# Patient Record
Sex: FEMALE | Race: OTHER | HISPANIC OR LATINO | URBAN - METROPOLITAN AREA
[De-identification: names, ages, dates, MRNs, and addresses within clinical notes are randomized per-mention and may not be internally consistent; named-entity substitution may affect disease eponyms.]

---

## 2018-09-21 ENCOUNTER — INPATIENT (INPATIENT)
Facility: HOSPITAL | Age: 83
LOS: 1 days | Discharge: HOME | DRG: 57 | End: 2018-09-23
Attending: STUDENT IN AN ORGANIZED HEALTH CARE EDUCATION/TRAINING PROGRAM | Admitting: STUDENT IN AN ORGANIZED HEALTH CARE EDUCATION/TRAINING PROGRAM
Payer: COMMERCIAL

## 2018-09-21 VITALS
HEART RATE: 136 BPM | RESPIRATION RATE: 16 BRPM | TEMPERATURE: 98 F | OXYGEN SATURATION: 96 % | DIASTOLIC BLOOD PRESSURE: 66 MMHG | SYSTOLIC BLOOD PRESSURE: 119 MMHG

## 2018-09-21 DIAGNOSIS — K59.09 OTHER CONSTIPATION: ICD-10-CM

## 2018-09-21 DIAGNOSIS — F03.90 UNSPECIFIED DEMENTIA WITHOUT BEHAVIORAL DISTURBANCE: ICD-10-CM

## 2018-09-21 DIAGNOSIS — R13.10 DYSPHAGIA, UNSPECIFIED: ICD-10-CM

## 2018-09-21 DIAGNOSIS — Z29.9 ENCOUNTER FOR PROPHYLACTIC MEASURES, UNSPECIFIED: ICD-10-CM

## 2018-09-21 DIAGNOSIS — G20 PARKINSON'S DISEASE: ICD-10-CM

## 2018-09-21 DIAGNOSIS — R62.7 ADULT FAILURE TO THRIVE: ICD-10-CM

## 2018-09-21 DIAGNOSIS — Z98.890 OTHER SPECIFIED POSTPROCEDURAL STATES: Chronic | ICD-10-CM

## 2018-09-21 DIAGNOSIS — I95.9 HYPOTENSION, UNSPECIFIED: ICD-10-CM

## 2018-09-21 LAB
ALBUMIN SERPL ELPH-MCNC: 3.4 G/DL — LOW (ref 3.5–5)
ALP SERPL-CCNC: 94 U/L — SIGNIFICANT CHANGE UP (ref 40–120)
ALT FLD-CCNC: 10 U/L DA — SIGNIFICANT CHANGE UP (ref 10–60)
ANION GAP SERPL CALC-SCNC: 6 MMOL/L — SIGNIFICANT CHANGE UP (ref 5–17)
APPEARANCE UR: CLEAR — SIGNIFICANT CHANGE UP
APTT BLD: 32.4 SEC — SIGNIFICANT CHANGE UP (ref 27.5–37.4)
AST SERPL-CCNC: 16 U/L — SIGNIFICANT CHANGE UP (ref 10–40)
BACTERIA # UR AUTO: ABNORMAL /HPF
BASOPHILS # BLD AUTO: 0.1 K/UL — SIGNIFICANT CHANGE UP (ref 0–0.2)
BASOPHILS NFR BLD AUTO: 1.1 % — SIGNIFICANT CHANGE UP (ref 0–2)
BILIRUB SERPL-MCNC: 0.4 MG/DL — SIGNIFICANT CHANGE UP (ref 0.2–1.2)
BILIRUB UR-MCNC: NEGATIVE — SIGNIFICANT CHANGE UP
BUN SERPL-MCNC: 15 MG/DL — SIGNIFICANT CHANGE UP (ref 7–18)
CALCIUM SERPL-MCNC: 8.8 MG/DL — SIGNIFICANT CHANGE UP (ref 8.4–10.5)
CHLORIDE SERPL-SCNC: 103 MMOL/L — SIGNIFICANT CHANGE UP (ref 96–108)
CO2 SERPL-SCNC: 29 MMOL/L — SIGNIFICANT CHANGE UP (ref 22–31)
COLOR SPEC: YELLOW — SIGNIFICANT CHANGE UP
COMMENT - URINE: SIGNIFICANT CHANGE UP
CREAT SERPL-MCNC: 0.57 MG/DL — SIGNIFICANT CHANGE UP (ref 0.5–1.3)
DIFF PNL FLD: ABNORMAL
EOSINOPHIL # BLD AUTO: 0.1 K/UL — SIGNIFICANT CHANGE UP (ref 0–0.5)
EOSINOPHIL NFR BLD AUTO: 1.2 % — SIGNIFICANT CHANGE UP (ref 0–6)
EPI CELLS # UR: SIGNIFICANT CHANGE UP /HPF
GLUCOSE SERPL-MCNC: 88 MG/DL — SIGNIFICANT CHANGE UP (ref 70–99)
GLUCOSE UR QL: NEGATIVE — SIGNIFICANT CHANGE UP
HCT VFR BLD CALC: 44.5 % — SIGNIFICANT CHANGE UP (ref 34.5–45)
HGB BLD-MCNC: 14.4 G/DL — SIGNIFICANT CHANGE UP (ref 11.5–15.5)
INR BLD: 1.1 RATIO — SIGNIFICANT CHANGE UP (ref 0.88–1.16)
KETONES UR-MCNC: NEGATIVE — SIGNIFICANT CHANGE UP
LACTATE SERPL-SCNC: 1.1 MMOL/L — SIGNIFICANT CHANGE UP (ref 0.7–2)
LEUKOCYTE ESTERASE UR-ACNC: ABNORMAL
LYMPHOCYTES # BLD AUTO: 1.6 K/UL — SIGNIFICANT CHANGE UP (ref 1–3.3)
LYMPHOCYTES # BLD AUTO: 20.7 % — SIGNIFICANT CHANGE UP (ref 13–44)
MAGNESIUM SERPL-MCNC: 2.5 MG/DL — SIGNIFICANT CHANGE UP (ref 1.6–2.6)
MCHC RBC-ENTMCNC: 30.9 PG — SIGNIFICANT CHANGE UP (ref 27–34)
MCHC RBC-ENTMCNC: 32.4 GM/DL — SIGNIFICANT CHANGE UP (ref 32–36)
MCV RBC AUTO: 95.3 FL — SIGNIFICANT CHANGE UP (ref 80–100)
MONOCYTES # BLD AUTO: 0.7 K/UL — SIGNIFICANT CHANGE UP (ref 0–0.9)
MONOCYTES NFR BLD AUTO: 9.2 % — SIGNIFICANT CHANGE UP (ref 2–14)
NEUTROPHILS # BLD AUTO: 5.1 K/UL — SIGNIFICANT CHANGE UP (ref 1.8–7.4)
NEUTROPHILS NFR BLD AUTO: 67.9 % — SIGNIFICANT CHANGE UP (ref 43–77)
NITRITE UR-MCNC: NEGATIVE — SIGNIFICANT CHANGE UP
PH UR: 6.5 — SIGNIFICANT CHANGE UP (ref 5–8)
PHOSPHATE SERPL-MCNC: 3.7 MG/DL — SIGNIFICANT CHANGE UP (ref 2.5–4.5)
PLATELET # BLD AUTO: 202 K/UL — SIGNIFICANT CHANGE UP (ref 150–400)
POTASSIUM SERPL-MCNC: 4.7 MMOL/L — SIGNIFICANT CHANGE UP (ref 3.5–5.3)
POTASSIUM SERPL-SCNC: 4.7 MMOL/L — SIGNIFICANT CHANGE UP (ref 3.5–5.3)
PROT SERPL-MCNC: 7.8 G/DL — SIGNIFICANT CHANGE UP (ref 6–8.3)
PROT UR-MCNC: NEGATIVE — SIGNIFICANT CHANGE UP
PROTHROM AB SERPL-ACNC: 12 SEC — SIGNIFICANT CHANGE UP (ref 9.8–12.7)
RBC # BLD: 4.66 M/UL — SIGNIFICANT CHANGE UP (ref 3.8–5.2)
RBC # FLD: 11.9 % — SIGNIFICANT CHANGE UP (ref 10.3–14.5)
RBC CASTS # UR COMP ASSIST: ABNORMAL /HPF (ref 0–2)
SODIUM SERPL-SCNC: 138 MMOL/L — SIGNIFICANT CHANGE UP (ref 135–145)
SP GR SPEC: 1.01 — SIGNIFICANT CHANGE UP (ref 1.01–1.02)
TROPONIN I SERPL-MCNC: <0.015 NG/ML — SIGNIFICANT CHANGE UP (ref 0–0.04)
TSH SERPL-MCNC: 0.59 UU/ML — SIGNIFICANT CHANGE UP (ref 0.34–4.82)
UROBILINOGEN FLD QL: NEGATIVE — SIGNIFICANT CHANGE UP
WBC # BLD: 7.5 K/UL — SIGNIFICANT CHANGE UP (ref 3.8–10.5)
WBC # FLD AUTO: 7.5 K/UL — SIGNIFICANT CHANGE UP (ref 3.8–10.5)
WBC UR QL: SIGNIFICANT CHANGE UP /HPF (ref 0–5)

## 2018-09-21 PROCEDURE — 71045 X-RAY EXAM CHEST 1 VIEW: CPT | Mod: 26

## 2018-09-21 PROCEDURE — 70450 CT HEAD/BRAIN W/O DYE: CPT | Mod: 26

## 2018-09-21 PROCEDURE — 99285 EMERGENCY DEPT VISIT HI MDM: CPT

## 2018-09-21 RX ORDER — SODIUM CHLORIDE 9 MG/ML
1000 INJECTION INTRAMUSCULAR; INTRAVENOUS; SUBCUTANEOUS ONCE
Qty: 0 | Refills: 0 | Status: COMPLETED | OUTPATIENT
Start: 2018-09-21 | End: 2018-09-21

## 2018-09-21 RX ORDER — DOCUSATE SODIUM 100 MG
100 CAPSULE ORAL
Qty: 0 | Refills: 0 | Status: DISCONTINUED | OUTPATIENT
Start: 2018-09-21 | End: 2018-09-23

## 2018-09-21 RX ORDER — ENOXAPARIN SODIUM 100 MG/ML
40 INJECTION SUBCUTANEOUS DAILY
Qty: 0 | Refills: 0 | Status: DISCONTINUED | OUTPATIENT
Start: 2018-09-21 | End: 2018-09-23

## 2018-09-21 RX ORDER — CARBIDOPA AND LEVODOPA 25; 100 MG/1; MG/1
1 TABLET ORAL
Qty: 0 | Refills: 0 | Status: DISCONTINUED | OUTPATIENT
Start: 2018-09-21 | End: 2018-09-22

## 2018-09-21 RX ORDER — SENNA PLUS 8.6 MG/1
2 TABLET ORAL AT BEDTIME
Qty: 0 | Refills: 0 | Status: DISCONTINUED | OUTPATIENT
Start: 2018-09-21 | End: 2018-09-23

## 2018-09-21 RX ORDER — POLYETHYLENE GLYCOL 3350 17 G/17G
17 POWDER, FOR SOLUTION ORAL DAILY
Qty: 0 | Refills: 0 | Status: DISCONTINUED | OUTPATIENT
Start: 2018-09-21 | End: 2018-09-23

## 2018-09-21 RX ADMIN — SODIUM CHLORIDE 1000 MILLILITER(S): 9 INJECTION INTRAMUSCULAR; INTRAVENOUS; SUBCUTANEOUS at 19:10

## 2018-09-21 RX ADMIN — SODIUM CHLORIDE 500 MILLILITER(S): 9 INJECTION INTRAMUSCULAR; INTRAVENOUS; SUBCUTANEOUS at 18:43

## 2018-09-21 NOTE — ED PROVIDER NOTE - CARE PLAN
Principal Discharge DX:	Hypotension  Secondary Diagnosis:	Failure to thrive in adult  Secondary Diagnosis:	Tachycardia

## 2018-09-21 NOTE — ED PROVIDER NOTE - PMH
Breast cancer    Chronic constipation    Dementia    Dysphagia    HLD (hyperlipidemia)    HTN (hypertension)    Parkinson's disease

## 2018-09-21 NOTE — ED ADULT NURSE NOTE - NSIMPLEMENTINTERV_GEN_ALL_ED
Implemented All Fall Risk Interventions:  [place] to call system. Call bell, personal items and telephone within reach. Instruct patient to call for assistance. Room bathroom lighting operational. Non-slip footwear when patient is off stretcher. Physically safe environment: no spills, clutter or unnecessary equipment. Stretcher in lowest position, wheels locked, appropriate side rails in place. Provide visual cue, wrist band, yellow gown, etc. Monitor gait and stability. Monitor for mental status changes and reorient to person, place, and time. Review medications for side effects contributing to fall risk. Reinforce activity limits and safety measures with patient and family.

## 2018-09-21 NOTE — ED ADULT NURSE NOTE - ED STAT RN HANDOFF DETAILS
pt.remained   stable.  denies  pain.   on Tele box B  with  nsr ,.transfer to  [id number]  given to nisa perez.pt.not   in distress

## 2018-09-21 NOTE — H&P ADULT - NSHPLABSRESULTS_GEN_ALL_CORE
LABS:      CBC Full  -  ( 21 Sep 2018 18:03 )  WBC Count : 7.5 K/uL  Hemoglobin : 14.4 g/dL  Hematocrit : 44.5 %  Platelet Count - Automated : 202 K/uL  Mean Cell Volume : 95.3 fl  Mean Cell Hemoglobin : 30.9 pg  Mean Cell Hemoglobin Concentration : 32.4 gm/dL  Auto Neutrophil # : 5.1 K/uL  Auto Lymphocyte # : 1.6 K/uL  Auto Monocyte # : 0.7 K/uL  Auto Eosinophil # : 0.1 K/uL  Auto Basophil # : 0.1 K/uL  Auto Neutrophil % : 67.9 %  Auto Lymphocyte % : 20.7 %  Auto Monocyte % : 9.2 %  Auto Eosinophil % : 1.2 %  Auto Basophil % : 1.1 %        138  |  103  |  15  ----------------------------<  88  4.7   |  29  |  0.57    Ca    8.8      21 Sep 2018 18:03  Phos  3.7       Mg     2.5         TPro  7.8  /  Alb  3.4<L>  /  TBili  0.4  /  DBili  x   /  AST  16  /  ALT  10  /  AlkPhos  94  -    PT/INR - ( 21 Sep 2018 18:03 )   PT: 12.0 sec;   INR: 1.10 ratio         PTT - ( 21 Sep 2018 18:03 )  PTT:32.4 sec      Urinalysis Basic - ( 21 Sep 2018 18:20 )    Color: Yellow / Appearance: Clear / S.010 / pH: x  Gluc: x / Ketone: Negative  / Bili: Negative / Urobili: Negative   Blood: x / Protein: Negative / Nitrite: Negative   Leuk Esterase: Trace / RBC: 2-5 /HPF / WBC 3-5 /HPF   Sq Epi: x / Non Sq Epi: Few /HPF / Bacteria: Few /HPF          RADIOLOGY & ADDITIONAL STUDIES (The following images were personally reviewed):      EXAM:  CT BRAIN                            PROCEDURE DATE:  2018          INTERPRETATION:  CT HEAD WITHOUT CONTRAST    INDICATION: 84 years old. Female. lethargy dysphagia.     COMPARISON: None available.    TECHNIQUE: Noncontrast axial CT head was obtained from the skull base to   vertex.    FINDINGS:  No acute intracranial hemorrhage, mass effect or midline shift.  No CT evidence of acute large territory vascular infarct.  The ventricles and cortical sulci are prominent reflecting parenchymal   volume loss.  Patchy hypodensities in the periventricular and subcortical white matter   are nonspecific, but likely sequela of small vessel ischemic disease.   There are also ischemic changes in the internal capsules.  Intracranial atherosclerotic calcifications are present.    The visualized paranasal sinuses and mastoid air cells are well aerated.   The native ocular lenses are surgically absent.  No displaced calvarial fracture.    IMPRESSION:  No acute intracranial hemorrhage or mass effect.      CXR:  mild atelectasis at left lung base

## 2018-09-21 NOTE — H&P ADULT - PROBLEM SELECTOR PLAN 3
patient has hx of dysphagia  continue with pureed diet, nectar thick for now  f/u formal speech and swallow

## 2018-09-21 NOTE — H&P ADULT - PROBLEM SELECTOR PLAN 1
decreased oral intake, however infectious etiology unlikely at this point  more likely decreased intake/activity secondary to requiring Parkinson medication adjustment decreased oral intake, however unlikely infectious etiology at this point given afebrile, no leukocytosis, CXR no consolidation, UA negative  more likely decreased intake/activity secondary to requiring Parkinson medication adjustment  will continue on home regimen for now  f/u neuro consult Dr. Winston for possible adjustment as necessary

## 2018-09-21 NOTE — ED PROVIDER NOTE - MEDICAL DECISION MAKING DETAILS
85 y/o M presents with low blood pressure and lethargy. Will rule out sepsis, CVA, electrolyte abnormality, Parkinson's crisis. Will obtain EKG, cardiac monitor, CXR, urinalysis, electrolytes, CBC, troponin, and TSH. Administer fluid bolus, reassess and admit.

## 2018-09-21 NOTE — ED PROVIDER NOTE - PROGRESS NOTE DETAILS
No emergent findings on workup, CXR read as possible infiltrate but given no cough, no leukocytosis, no fever, and equivocal read, will defer abx unless clinical picture changes. hypotension likely 2/2 paroxysmal tachyarrhythmia given marked tachycardia on arrival that abruptly normalized without intervention, pt admitted to telemetry for further workup

## 2018-09-21 NOTE — H&P ADULT - PROBLEM SELECTOR PLAN 2
1.5 tabs of sinemet tabs 6 times per day as per family  will continue with patient's regimen for now  continue to evaluate for increased activity 1.5 tabs of sinemet tabs 6 times per day as per family  will continue with patient's regimen for now  continue to evaluate for increased/decreased activity

## 2018-09-21 NOTE — H&P ADULT - ATTENDING COMMENTS
Agree with above   patient seen and examined at bedside   She is a 83 y/o F with PMHx of HTN, HLD, dementia, Parkinson's disease, chronic constipation and dysphagia x years, breast cancer s/p lumpectomy, brought in by family due to reduced oral intake and activity over the last week. Patient was originally brought to PCP office, at which point they had noted hypotension and tachycardia, and advised family to bring patient to ED. Per family, patient has been gradually decreasing PO intake, however has successfully been taking medications with some ounces of Ensure. Family otherwise denies all other complaints including fever, sweating, vomiting, diarrhea, shortness of breath. Per , patient's last bowel movement noted to be 4 days ago. Patient's family deny any recent medication changes, have noted that citalopram was gradually tapered by PCP. Patient is non-verbal and history is limited by dementia    ROS: not able to obtain ROS from patient   PE:   General: elderly lady, laying in bed. Does not respond to questions, just makes face grimaces   Neck; Supple   cardio: regular rate and rhythm   Pulm: clear breath sounds, no wheezing   GI: abdomens is soft, non tender \  Extr: no edema.   Neuro: Alert, not oriented, no focal weakness     Vital Signs Last 24 Hrs  T(C): 36.5 (22 Sep 2018 11:52), Max: 36.9 (22 Sep 2018 07:25)  T(F): 97.7 (22 Sep 2018 11:52), Max: 98.5 (22 Sep 2018 07:25)  HR: 76 (22 Sep 2018 11:52) (68 - 136)  BP: 105/56 (22 Sep 2018 11:52) (105/56 - 147/77)  BP(mean): --  RR: 16 (22 Sep 2018 11:52) (16 - 18)  SpO2: 99% (22 Sep 2018 11:52) (95% - 99%)    1. Failure to thrive   2. Advance PD  3. Dehydration   4. Chronic constipation     1. Failure to thrive likely secondary to advance PD: patient on Sinemet q4 and will continue with the same dose   IV hydration   Labs and imaging within normal limits   PT evaluation if patient participates   Patient on dysphagia diet and should continue with the same   Will speak to family about comfort care.   d/c telemetry monitoring   Will give stool softeners and one enema     The rest as above

## 2018-09-21 NOTE — ED PROVIDER NOTE - CARDIAC, MLM
Normal rate, regular rhythm.  Heart sounds S1, S2.  No murmurs, rubs or gallops. (Resolved from triage)

## 2018-09-21 NOTE — H&P ADULT - ASSESSMENT
83 y/o F with PMHx of HTN, HLD, dementia, Parkinson's disease, chronic constipation and dysphagia x years, breast cancer s/p lumpectomy, brought in by family due to reduced oral intake and activity over the last week. Patient was originally brought to PCP office, at which point they had noted hypotension and tachycardia, and advised family to bring patient to ED. Per family, patient has been gradually decreasing PO intake, however has successfully been taking medications with some ounces of Ensure. Family otherwise denies all other complaints including fever, sweating, vomiting, diarrhea, shortness of breath. Per , patient's last bowel movement noted to be 4 days ago. Patient's family deny any recent medication changes, have noted that citalopram was gradually tapered by PCP    Patient admitted due to lethargy, poor oral intake, decreased activity. However patient does not have any evidence of fever, leukocytosis, or any other signs of infection.   Patient likely needs Parkinson medication adjustment.     GOC: Discussed with family. Patient is FULL CODE for now

## 2018-09-21 NOTE — ED PROVIDER NOTE - OBJECTIVE STATEMENT
83 y/o F with PMHx of HTN, HLD, dementia, Parkinson's disease, chronic constipation and dysphagia x years, breast cancer s/p lumpectomy presents to the ED with complaints of low blood pressure and lethargy. Patient has been refusing PO intake x 4 days except for medications and a few ounces of Ensure. Family brought patient to PCP at which time blood pressure was low. Patient was then referred to ED for further evaluation. Patient has chronically poor intake, which has worsened over last few days as per family. Denies vomiting, blood stool, cough, fever, medication changes or any other complaints. Patient is non-verbal and history is limited by dementia. NKDA.

## 2018-09-21 NOTE — H&P ADULT - PROBLEM SELECTOR PLAN 6
IMPROVE VTE Individual Risk Assessment          RISK                                                          Points  [  ] Previous VTE                                                3  [  ] Thrombophilia                                             2  [ x ] Lower limb paralysis                                   2        (unable to hold up >15 seconds)    [  ] Current Cancer                                             2         (within 6 months)  [ x ] Immobilization > 24 hrs                              1  [  ] ICU/CCU stay > 24 hours                             1  [ x ] Age > 60                                                         1    IMPROVE VTE Score: 4    c/w lovenox for vte prophylaxis  no gi prophylaxis necessary at this time

## 2018-09-21 NOTE — H&P ADULT - NSHPPHYSICALEXAM_GEN_ALL_CORE
Vital Signs Last 24 Hrs  T(C): 36.3 (21 Sep 2018 21:08), Max: 36.8 (21 Sep 2018 16:18)  T(F): 97.4 (21 Sep 2018 21:08), Max: 98.3 (21 Sep 2018 16:18)  HR: 75 (21 Sep 2018 21:08) (75 - 136)  BP: 147/77 (21 Sep 2018 21:08) (119/66 - 147/77)  RR: 18 (21 Sep 2018 21:08) (16 - 18)  SpO2: 99% (21 Sep 2018 21:08) (96% - 99%)    ________________________________________________  PHYSICAL EXAM:  GENERAL: NAD  HEENT: Normocephalic;  conjunctivae and sclerae clear; PERRL  NECK : supple  CHEST/LUNG: Clear to auscultation bilaterally with good air entry   HEART: S1 S2  regular; no murmurs, gallops or rubs  ABDOMEN: somewhat firm, Nontender, Nondistended; Bowel sounds present  EXTREMITIES: no cyanosis; no edema; no calf tenderness; lower extremities stiff  NERVOUS SYSTEM:  Awake , minimally verbal, masked facies, cogwheel rigidity on bilateral upper extremities R>L, good strength, moves all extremities spontaneously

## 2018-09-22 LAB
ANION GAP SERPL CALC-SCNC: 9 MMOL/L — SIGNIFICANT CHANGE UP (ref 5–17)
BUN SERPL-MCNC: 13 MG/DL — SIGNIFICANT CHANGE UP (ref 7–18)
CALCIUM SERPL-MCNC: 8.8 MG/DL — SIGNIFICANT CHANGE UP (ref 8.4–10.5)
CHLORIDE SERPL-SCNC: 106 MMOL/L — SIGNIFICANT CHANGE UP (ref 96–108)
CHOLEST SERPL-MCNC: 233 MG/DL — HIGH (ref 10–199)
CK MB BLD-MCNC: 2 % — SIGNIFICANT CHANGE UP (ref 0–3.5)
CK MB CFR SERPL CALC: 1 NG/ML — SIGNIFICANT CHANGE UP (ref 0–3.6)
CK SERPL-CCNC: 49 U/L — SIGNIFICANT CHANGE UP (ref 21–215)
CO2 SERPL-SCNC: 24 MMOL/L — SIGNIFICANT CHANGE UP (ref 22–31)
CREAT SERPL-MCNC: 0.6 MG/DL — SIGNIFICANT CHANGE UP (ref 0.5–1.3)
CULTURE RESULTS: NO GROWTH — SIGNIFICANT CHANGE UP
GLUCOSE SERPL-MCNC: 104 MG/DL — HIGH (ref 70–99)
HBA1C BLD-MCNC: 5.4 % — SIGNIFICANT CHANGE UP (ref 4–5.6)
HCT VFR BLD CALC: 45.5 % — HIGH (ref 34.5–45)
HDLC SERPL-MCNC: 43 MG/DL — LOW
HGB BLD-MCNC: 14.6 G/DL — SIGNIFICANT CHANGE UP (ref 11.5–15.5)
LIPID PNL WITH DIRECT LDL SERPL: 163 MG/DL — SIGNIFICANT CHANGE UP
MAGNESIUM SERPL-MCNC: 2.2 MG/DL — SIGNIFICANT CHANGE UP (ref 1.6–2.6)
MCHC RBC-ENTMCNC: 30.7 PG — SIGNIFICANT CHANGE UP (ref 27–34)
MCHC RBC-ENTMCNC: 32.1 GM/DL — SIGNIFICANT CHANGE UP (ref 32–36)
MCV RBC AUTO: 95.5 FL — SIGNIFICANT CHANGE UP (ref 80–100)
PHOSPHATE SERPL-MCNC: 3.1 MG/DL — SIGNIFICANT CHANGE UP (ref 2.5–4.5)
PLATELET # BLD AUTO: 187 K/UL — SIGNIFICANT CHANGE UP (ref 150–400)
POTASSIUM SERPL-MCNC: 3.8 MMOL/L — SIGNIFICANT CHANGE UP (ref 3.5–5.3)
POTASSIUM SERPL-SCNC: 3.8 MMOL/L — SIGNIFICANT CHANGE UP (ref 3.5–5.3)
RBC # BLD: 4.76 M/UL — SIGNIFICANT CHANGE UP (ref 3.8–5.2)
RBC # FLD: 11.8 % — SIGNIFICANT CHANGE UP (ref 10.3–14.5)
SODIUM SERPL-SCNC: 139 MMOL/L — SIGNIFICANT CHANGE UP (ref 135–145)
SPECIMEN SOURCE: SIGNIFICANT CHANGE UP
TOTAL CHOLESTEROL/HDL RATIO MEASUREMENT: 5.4 RATIO — SIGNIFICANT CHANGE UP (ref 3.3–7.1)
TRIGL SERPL-MCNC: 134 MG/DL — SIGNIFICANT CHANGE UP (ref 10–149)
TROPONIN I SERPL-MCNC: <0.015 NG/ML — SIGNIFICANT CHANGE UP (ref 0–0.04)
TSH SERPL-MCNC: 0.79 UU/ML — SIGNIFICANT CHANGE UP (ref 0.34–4.82)
VIT B12 SERPL-MCNC: 430 PG/ML — SIGNIFICANT CHANGE UP (ref 232–1245)
WBC # BLD: 7.5 K/UL — SIGNIFICANT CHANGE UP (ref 3.8–10.5)
WBC # FLD AUTO: 7.5 K/UL — SIGNIFICANT CHANGE UP (ref 3.8–10.5)

## 2018-09-22 PROCEDURE — 99223 1ST HOSP IP/OBS HIGH 75: CPT | Mod: GC

## 2018-09-22 PROCEDURE — 99223 1ST HOSP IP/OBS HIGH 75: CPT

## 2018-09-22 RX ORDER — INFLUENZA VIRUS VACCINE 15; 15; 15; 15 UG/.5ML; UG/.5ML; UG/.5ML; UG/.5ML
0.5 SUSPENSION INTRAMUSCULAR ONCE
Qty: 0 | Refills: 0 | Status: DISCONTINUED | OUTPATIENT
Start: 2018-09-22 | End: 2018-09-23

## 2018-09-22 RX ORDER — PANTOPRAZOLE SODIUM 20 MG/1
40 TABLET, DELAYED RELEASE ORAL
Qty: 0 | Refills: 0 | Status: DISCONTINUED | OUTPATIENT
Start: 2018-09-22 | End: 2018-09-23

## 2018-09-22 RX ORDER — CLONAZEPAM 1 MG
0.5 TABLET ORAL DAILY
Qty: 0 | Refills: 0 | Status: DISCONTINUED | OUTPATIENT
Start: 2018-09-22 | End: 2018-09-23

## 2018-09-22 RX ORDER — CARBIDOPA AND LEVODOPA 25; 100 MG/1; MG/1
1.5 TABLET ORAL
Qty: 0 | Refills: 0 | Status: DISCONTINUED | OUTPATIENT
Start: 2018-09-22 | End: 2018-09-23

## 2018-09-22 RX ADMIN — CARBIDOPA AND LEVODOPA 1.5 TABLET(S): 25; 100 TABLET ORAL at 17:07

## 2018-09-22 RX ADMIN — CARBIDOPA AND LEVODOPA 1.5 TABLET(S): 25; 100 TABLET ORAL at 13:34

## 2018-09-22 RX ADMIN — SENNA PLUS 2 TABLET(S): 8.6 TABLET ORAL at 21:07

## 2018-09-22 RX ADMIN — CARBIDOPA AND LEVODOPA 1.5 TABLET(S): 25; 100 TABLET ORAL at 19:33

## 2018-09-22 RX ADMIN — CARBIDOPA AND LEVODOPA 1.5 TABLET(S): 25; 100 TABLET ORAL at 09:25

## 2018-09-22 RX ADMIN — CARBIDOPA AND LEVODOPA 1 TABLET(S): 25; 100 TABLET ORAL at 06:01

## 2018-09-22 RX ADMIN — ENOXAPARIN SODIUM 40 MILLIGRAM(S): 100 INJECTION SUBCUTANEOUS at 13:37

## 2018-09-22 RX ADMIN — Medication 100 MILLIGRAM(S): at 06:01

## 2018-09-22 RX ADMIN — Medication 0.5 MILLIGRAM(S): at 20:27

## 2018-09-22 NOTE — PHYSICAL THERAPY INITIAL EVALUATION ADULT - IMPAIRMENTS CONTRIBUTING IMPAIRED BED MOBILITY, REHAB EVAL
cognition/impaired coordination/decreased strength/impaired motor control/abnormal muscle tone/impaired balance

## 2018-09-22 NOTE — PHYSICAL THERAPY INITIAL EVALUATION ADULT - ADDITIONAL COMMENTS
Per son, patient ambulates very short distance with handheld assist, needs assistance with all ADLs, has hospital bed and shower chair.

## 2018-09-22 NOTE — PHYSICAL THERAPY INITIAL EVALUATION ADULT - GENERAL OBSERVATIONS, REHAB EVAL
Pt seen supine in bed, (+) telemetry, minimally verbal, pt speaks English and Norwegian, family at bedside

## 2018-09-22 NOTE — CONSULT NOTE ADULT - SUBJECTIVE AND OBJECTIVE BOX
Patient is a 84y old  Female who presents with a chief complaint of     HPI:  Asked to evaluate patient for PD and medications.  Patient brought to hospital for failure to thrive and decreased PO intake.  Currently, family at bedside and family states she is at baseline.  Family states she has had PD for 20 years    PAST MEDICAL & SURGICAL HISTORY:  Dysphagia  Chronic constipation  Breast cancer  Dementia  Parkinson's disease  HLD (hyperlipidemia)  HTN (hypertension)  H/O lumpectomy      FAMILY HISTORY:  No pertinent family history in first degree relatives        Social Hx:  Nonsmoker, no drug or alcohol use    MEDICATIONS  (STANDING):  carbidopa/levodopa  25/100 1.5 Tablet(s) Oral <User Schedule>  docusate sodium 100 milliGRAM(s) Oral two times a day  enoxaparin Injectable 40 milliGRAM(s) SubCutaneous daily  influenza   Vaccine 0.5 milliLiter(s) IntraMuscular once  polyethylene glycol 3350 17 Gram(s) Oral daily  senna 2 Tablet(s) Oral at bedtime       Allergies    No Known Allergies    Intolerances        ROS: Pertinent positives in HPI, all other ROS were reviewed and are negative.      Vital Signs Last 24 Hrs  T(C): 36.9 (22 Sep 2018 07:25), Max: 36.9 (22 Sep 2018 07:25)  T(F): 98.5 (22 Sep 2018 07:25), Max: 98.5 (22 Sep 2018 07:25)  HR: 101 (22 Sep 2018 07:25) (68 - 136)  BP: 131/66 (22 Sep 2018 07:25) (119/66 - 147/77)  BP(mean): --  RR: 18 (22 Sep 2018 07:25) (16 - 18)  SpO2: 95% (22 Sep 2018 07:25) (95% - 99%)        Constitutional: awake and alert.  HEENT: PERRLA  Neck: Supple.  Respiratory: Breath sounds are clear bilaterally  Cardiovascular: S1 and S2, regular / irregular rhythm  Gastrointestinal: soft, nontender  Extremities:  no edema  Vascular: Carotid Bruit - no  Musculoskeletal: no joint swelling/tenderness, no abnormal movements  Skin: No rashes    Neurological exam:  Mental status: awake, follows commands, does not verbalize  CN: PERRL, restricted up gaze, mask facies  Motor: Increased tone, with cogwheeling in all four extremities, good strength  Sens: Intact to light touch  Reflexes: 1+ throughout  Coord:  no tremor  Gait/Balance: Not tested        Labs:                        14.6   7.5   )-----------( 187      ( 22 Sep 2018 07:21 )             45.5     09-22    139  |  106  |  13  ----------------------------<  104<H>  3.8   |  24  |  0.60    Ca    8.8      22 Sep 2018 07:21  Phos  3.1     09-22  Mg     2.2     09-22    TPro  7.8  /  Alb  3.4<L>  /  TBili  0.4  /  DBili  x   /  AST  16  /  ALT  10  /  AlkPhos  94  09-21 09-22 [id number] 5.4    09-22 Chol 233<H>  HDL 43<L> Trig 134  PT/INR - ( 21 Sep 2018 18:03 )   PT: 12.0 sec;   INR: 1.10 ratio         PTT - ( 21 Sep 2018 18:03 )  PTT:32.4 sec    Radiology report:  < from: CT Head No Cont (09.21.18 @ 18:40) >  EXAM:  CT BRAIN                            PROCEDURE DATE:  09/21/2018          INTERPRETATION:  CT HEAD WITHOUT CONTRAST    INDICATION: 84 years old. Female. lethargy dysphagia.     COMPARISON: None available.    TECHNIQUE: Noncontrast axial CT head was obtained from the skull base to   vertex.    FINDINGS:  No acute intracranial hemorrhage, mass effect or midline shift.  No CT evidence of acute large territory vascular infarct.  The ventricles and cortical sulci are prominent reflecting parenchymal   volume loss.  Patchy hypodensities in the periventricular and subcortical white matter   are nonspecific, but likely sequela of small vessel ischemic disease.   There are also ischemic changes in the internal capsules.  Intracranial atherosclerotic calcifications are present.    The visualized paranasal sinuses and mastoid air cells are well aerated.   The native ocular lenses are surgically absent.  No displaced calvarial fracture.    IMPRESSION:  No acute intracranial hemorrhage or masseffect.    < end of copied text >      A/P:  Patient with advanced stage PD.  At this time, sinemet is probably having very little to no effect.     Would continue current regimen 1.5 tabs 6x daily  Swallow eval, if compromised would have discussion with family regarding goals of care and ?PEG.  I have begun the conversation with them today

## 2018-09-22 NOTE — PHYSICAL THERAPY INITIAL EVALUATION ADULT - REHAB POTENTIAL, PT EVAL
pt will be placed for trial PT x 1 wk pending participation and progress/fair, will monitor progress closely

## 2018-09-22 NOTE — PHYSICAL THERAPY INITIAL EVALUATION ADULT - IMPAIRMENTS FOUND, PT EVAL
gait, locomotion, and balance/poor safety awareness/cognitive impairment/joint integrity and mobility/muscle strength

## 2018-09-22 NOTE — PHYSICAL THERAPY INITIAL EVALUATION ADULT - BALANCE DISTURBANCE, IDENTIFIED IMPAIRMENT CONTRIBUTE, REHAB EVAL
impaired coordination/impaired motor control/impaired postural control/decreased strength/abnormal muscle tone

## 2018-09-23 ENCOUNTER — TRANSCRIPTION ENCOUNTER (OUTPATIENT)
Age: 83
End: 2018-09-23

## 2018-09-23 VITALS
HEART RATE: 66 BPM | DIASTOLIC BLOOD PRESSURE: 61 MMHG | OXYGEN SATURATION: 100 % | SYSTOLIC BLOOD PRESSURE: 129 MMHG | TEMPERATURE: 98 F | RESPIRATION RATE: 16 BRPM

## 2018-09-23 LAB
24R-OH-CALCIDIOL SERPL-MCNC: 11 NG/ML — LOW (ref 30–80)
ANION GAP SERPL CALC-SCNC: 5 MMOL/L — SIGNIFICANT CHANGE UP (ref 5–17)
BUN SERPL-MCNC: 18 MG/DL — SIGNIFICANT CHANGE UP (ref 7–18)
CALCIUM SERPL-MCNC: 8.8 MG/DL — SIGNIFICANT CHANGE UP (ref 8.4–10.5)
CHLORIDE SERPL-SCNC: 109 MMOL/L — HIGH (ref 96–108)
CO2 SERPL-SCNC: 26 MMOL/L — SIGNIFICANT CHANGE UP (ref 22–31)
CREAT SERPL-MCNC: 0.43 MG/DL — LOW (ref 0.5–1.3)
GLUCOSE SERPL-MCNC: 81 MG/DL — SIGNIFICANT CHANGE UP (ref 70–99)
MAGNESIUM SERPL-MCNC: 2.2 MG/DL — SIGNIFICANT CHANGE UP (ref 1.6–2.6)
PHOSPHATE SERPL-MCNC: 3 MG/DL — SIGNIFICANT CHANGE UP (ref 2.5–4.5)
POTASSIUM SERPL-MCNC: 3.7 MMOL/L — SIGNIFICANT CHANGE UP (ref 3.5–5.3)
POTASSIUM SERPL-SCNC: 3.7 MMOL/L — SIGNIFICANT CHANGE UP (ref 3.5–5.3)
SODIUM SERPL-SCNC: 140 MMOL/L — SIGNIFICANT CHANGE UP (ref 135–145)

## 2018-09-23 PROCEDURE — 85027 COMPLETE CBC AUTOMATED: CPT

## 2018-09-23 PROCEDURE — 70450 CT HEAD/BRAIN W/O DYE: CPT

## 2018-09-23 PROCEDURE — 82306 VITAMIN D 25 HYDROXY: CPT

## 2018-09-23 PROCEDURE — 86900 BLOOD TYPING SEROLOGIC ABO: CPT

## 2018-09-23 PROCEDURE — 82962 GLUCOSE BLOOD TEST: CPT

## 2018-09-23 PROCEDURE — 87086 URINE CULTURE/COLONY COUNT: CPT

## 2018-09-23 PROCEDURE — 85610 PROTHROMBIN TIME: CPT

## 2018-09-23 PROCEDURE — 82607 VITAMIN B-12: CPT

## 2018-09-23 PROCEDURE — 84443 ASSAY THYROID STIM HORMONE: CPT

## 2018-09-23 PROCEDURE — 80053 COMPREHEN METABOLIC PANEL: CPT

## 2018-09-23 PROCEDURE — 86850 RBC ANTIBODY SCREEN: CPT

## 2018-09-23 PROCEDURE — 93005 ELECTROCARDIOGRAM TRACING: CPT

## 2018-09-23 PROCEDURE — 83605 ASSAY OF LACTIC ACID: CPT

## 2018-09-23 PROCEDURE — 82550 ASSAY OF CK (CPK): CPT

## 2018-09-23 PROCEDURE — 83036 HEMOGLOBIN GLYCOSYLATED A1C: CPT

## 2018-09-23 PROCEDURE — 80048 BASIC METABOLIC PNL TOTAL CA: CPT

## 2018-09-23 PROCEDURE — 99238 HOSP IP/OBS DSCHRG MGMT 30/<: CPT

## 2018-09-23 PROCEDURE — 82553 CREATINE MB FRACTION: CPT

## 2018-09-23 PROCEDURE — 81001 URINALYSIS AUTO W/SCOPE: CPT

## 2018-09-23 PROCEDURE — 71045 X-RAY EXAM CHEST 1 VIEW: CPT

## 2018-09-23 PROCEDURE — 80061 LIPID PANEL: CPT

## 2018-09-23 PROCEDURE — 83735 ASSAY OF MAGNESIUM: CPT

## 2018-09-23 PROCEDURE — 99285 EMERGENCY DEPT VISIT HI MDM: CPT | Mod: 25

## 2018-09-23 PROCEDURE — 85730 THROMBOPLASTIN TIME PARTIAL: CPT

## 2018-09-23 PROCEDURE — 86901 BLOOD TYPING SEROLOGIC RH(D): CPT

## 2018-09-23 PROCEDURE — 84484 ASSAY OF TROPONIN QUANT: CPT

## 2018-09-23 PROCEDURE — 84100 ASSAY OF PHOSPHORUS: CPT

## 2018-09-23 PROCEDURE — 97162 PT EVAL MOD COMPLEX 30 MIN: CPT

## 2018-09-23 RX ORDER — PANTOPRAZOLE SODIUM 20 MG/1
1 TABLET, DELAYED RELEASE ORAL
Qty: 30 | Refills: 0
Start: 2018-09-23 | End: 2018-10-22

## 2018-09-23 RX ADMIN — POLYETHYLENE GLYCOL 3350 17 GRAM(S): 17 POWDER, FOR SOLUTION ORAL at 12:09

## 2018-09-23 RX ADMIN — ENOXAPARIN SODIUM 40 MILLIGRAM(S): 100 INJECTION SUBCUTANEOUS at 12:08

## 2018-09-23 RX ADMIN — CARBIDOPA AND LEVODOPA 1.5 TABLET(S): 25; 100 TABLET ORAL at 10:24

## 2018-09-23 RX ADMIN — CARBIDOPA AND LEVODOPA 1.5 TABLET(S): 25; 100 TABLET ORAL at 12:09

## 2018-09-23 NOTE — DISCHARGE NOTE ADULT - MEDICATION SUMMARY - MEDICATIONS TO TAKE
I will START or STAY ON the medications listed below when I get home from the hospital:    carbidopa-levodopa 25 mg-100 mg oral tablet  -- 1.5 tab(s) by mouth 6 times a day  -- Indication: For Parkinson's disease    Protonix 40 mg oral delayed release tablet  -- 1 tab(s) by mouth once a day (before a meal)  -- Indication: For GERD

## 2018-09-23 NOTE — DISCHARGE NOTE ADULT - HOSPITAL COURSE
83 y/o F with PMHx of HTN, HLD, dementia, Parkinson's disease, chronic constipation and dysphagia x years, breast cancer s/p lumpectomy, brought in by family due to reduced oral intake and activity over the last week. Patient was originally brought to PCP office, at which point they had noted hypotension and tachycardia, and advised family to bring patient to ED. Per family, patient has been gradually decreasing PO intake, however has successfully been taking medications with some ounces of Ensure. Family otherwise denies all other complaints including fever, sweating, vomiting, diarrhea, shortness of breath. Per , patient's last bowel movement noted to be 4 days ago. Patient's family deny any recent medication changes, have noted that citalopram was gradually tapered by PCPPatient admitted due to lethargy, poor oral intake, decreased activity. However patient does not have any evidence of fever, leukocytosis, or any other signs of infection. Patient likely needs Parkinson medication adjustment. GOC: Discussed with family. Patient is FULL CODE for now . decreased oral intake, however unlikely infectious etiology at this point given afebrile, no leukocytosis, CXR no consolidation, UA negative . more likely decreased intake/activity secondary to requiring Parkinson medication adjustment . neuro consulted Dr. Winston . Given patient's improved clinical status and current hemodynamic stability, decision was made to discharge the patient. Patient is stable for discharge per attending and is advised to follow up with PCP as outpatient . Please refer to patient's complete medical chart with documents for a full hospital course, for this is only a brief summary.

## 2018-09-23 NOTE — PROGRESS NOTE ADULT - PROBLEM SELECTOR PLAN 1
decreased oral intake, however unlikely infectious etiology at this point given afebrile, no leukocytosis, CXR no consolidation, UA negative  more likely decreased intake/activity secondary to requiring Parkinson medication adjustment  will continue on home regimen for now  f/u neuro consult Dr. Winston for possible adjustment as necessary

## 2018-09-23 NOTE — PROGRESS NOTE ADULT - SUBJECTIVE AND OBJECTIVE BOX
PGY1 Note discussed with Supervising Resident and Primary Attending.    Patient is a 84y old  Female who presents with a chief complaint of failure to thrive (22 Sep 2018 10:18)      INTERVAL HPI/OVERNIGHT EVENTS :    MEDICATIONS  (STANDING):  carbidopa/levodopa  25/100 1.5 Tablet(s) Oral <User Schedule>  docusate sodium 100 milliGRAM(s) Oral two times a day  enoxaparin Injectable 40 milliGRAM(s) SubCutaneous daily  influenza   Vaccine 0.5 milliLiter(s) IntraMuscular once  pantoprazole    Tablet 40 milliGRAM(s) Oral before breakfast  polyethylene glycol 3350 17 Gram(s) Oral daily  senna 2 Tablet(s) Oral at bedtime    MEDICATIONS  (PRN):  aluminum hydroxide/magnesium hydroxide/simethicone Suspension 30 milliLiter(s) Oral every 4 hours PRN Dyspepsia  clonazePAM Tablet 0.5 milliGRAM(s) Oral daily PRN anxiety      Allergies    No Known Allergies    Intolerances        REVIEW OF SYSTEMS :  * CONSTITUTIONAL      : No Fever, Weight loss, or Fatigue  * EYES                             : No eye pain , Visual disturbances or Discharge  * RESPIRATORY             : No Cough, Wheezing, Chills or Hemoptysis; No shortness of breath  * CARDIOVASCULAR     : No Chest pain, Palpitations, Dizziness, or Leg swelling  * GASTROINTESTINAL  : No Abdominal or Epigastric pain. No Nausea, Vomiting or Hematemesis; No Diarrhea or Constipation. No Melena or Hematochezia.  * GENITOURINARY        : No Dysuria , Frequency , Haematuria   * NEUROLOGICAL          : No Headaches, Memory loss, Loss of trength, Numbness, or Tremors  * MUSCULOSKELETAL   : No Joint pain  * PSYCHIATRY                 : No Depression or Anxiety   * HEME/LYMPH              : No Easy Bruising or Bleeding gums  * SKIN                               : No Itching, Burning, Rashes, or Lesions     Vital Signs Last 24 Hrs  T(C): 36.2 (23 Sep 2018 05:58), Max: 37.7 (22 Sep 2018 20:20)  T(F): 97.2 (23 Sep 2018 05:58), Max: 99.8 (22 Sep 2018 20:20)  HR: 68 (23 Sep 2018 05:58) (68 - 107)  BP: 131/73 (23 Sep 2018 05:58) (98/54 - 147/71)  BP(mean): --  RR: 18 (23 Sep 2018 05:58) (16 - 18)  SpO2: 100% (23 Sep 2018 05:58) (95% - 100%)    PHYSICAL EXAM :  * GENERAL                 : NAD, Well-groomed, Well-developed  * HEAD                       :  Atraumatic, Normocephalic  * EYES                         : EOMI, PERRLA, Conjunctiva and Sclera clear  * ENT                           : Moist Mucous Membranes  * NECK                         : Supple, No JVD, Normal Thyroid  * CHEST/LUNG           : Clear to Auscultation bilaterally; No Rales, Rhonchi, Wheezing or Rubs  * HEART                       : Regular Rate and Rhythm; No murmurs, Rubs or gallops  * ABDOMEN                : Soft, Non-tender, Non-distended; Bowel Sounds present  * NERVOUS SYSTEM  :  Alert & Oriented X3, Good Concentration; Motor Strength 5/5 B/L UL LL ; DTRs 2+ Intact and Symmetric  * EXTREMITIES            :  2+ Peripheral Pulses, No clubbing, cyanosis, or edema  * SKIN                           : No Rashes or Lesions    LABS:                          14.6   7.5   )-----------( 187      ( 22 Sep 2018 07:21 )             45.5         139  |  106  |  13  ----------------------------<  104<H>  3.8   |  24  |  0.60    Ca    8.8      22 Sep 2018 07:21  Phos  3.1       Mg     2.2         TPro  7.8  /  Alb  3.4<L>  /  TBili  0.4  /  DBili  x   /  AST  16  /  ALT  10  /  AlkPhos  94  -    PT/INR - ( 21 Sep 2018 18:03 )   PT: 12.0 sec;   INR: 1.10 ratio         PTT - ( 21 Sep 2018 18:03 )  PTT:32.4 sec  Urinalysis Basic - ( 21 Sep 2018 18:20 )    Color: Yellow / Appearance: Clear / S.010 / pH: x  Gluc: x / Ketone: Negative  / Bili: Negative / Urobili: Negative   Blood: x / Protein: Negative / Nitrite: Negative   Leuk Esterase: Trace / RBC: 2-5 /HPF / WBC 3-5 /HPF   Sq Epi: x / Non Sq Epi: Few /HPF / Bacteria: Few /HPF      CAPILLARY BLOOD GLUCOSE          RADIOLOGY & ADDITIONAL TESTS:   No radiological imaging was required    Imaging Personally Reviewed   :  [ ] YES  [ ] NO    Consultant(s) Notes Reviewed :  [ ] YES  [ ] NO

## 2018-09-23 NOTE — DISCHARGE NOTE ADULT - PLAN OF CARE
management - You presented with decreased oral intake, however unlikely infectious etiology at this point given afebrile, no leukocytosis,   - Chest X-ray showed no consolidation, Urine analysis was negative more likely decreased intake/activity secondary to requiring Parkinson   neuro consulted Dr. Winston  - You are medically stable to be discharged from the hospital.  - You need to follow up with your Primary Care Physician in 1 week.  - You need to continue your medications regularly as prescribed. - You have a history of Parkinson's disease  - You are medically stable to be discharged from the hospital.  - You need to follow up with your Primary Care Physician in 1 week.  - You need to continue your medications regularly as prescribed. - You have a history of dysphagia  - You need to continue your medications protonix regularly as prescribed. AOx1 at baseline, minimally verbal 1-2 words. last BM 4 days ago  you were given tap water enema x1 along with senna, colace, miralax ATC.

## 2018-09-23 NOTE — PROGRESS NOTE ADULT - PROBLEM SELECTOR PLAN 2
1.5 tabs of sinemet tabs 6 times per day as per family  will continue with patient's regimen for now  continue to evaluate for increased/decreased activity

## 2018-09-23 NOTE — DISCHARGE NOTE ADULT - PATIENT PORTAL LINK FT
You can access the [facility] Patient Portal, offered by [facility], by registering with the following website: [email or web address]

## 2018-09-25 RX ORDER — CARBIDOPA AND LEVODOPA 25; 100 MG/1; MG/1
1 TABLET ORAL
Qty: 0 | Refills: 0 | COMMUNITY

## 2018-10-06 NOTE — PROGRESS NOTE ADULT - PROBLEM/PLAN-4
Equal and normal pulses (carotid, femoral, dorsalis pedis) DISPLAY PLAN FREE TEXT Equal and normal pulses (carotid, femoral, dorsalis pedis) Equal and normal pulses (carotid, femoral, dorsalis pedis)

## 2018-12-07 NOTE — H&P ADULT - HISTORY OF PRESENT ILLNESS
83 y/o F with PMHx of HTN, HLD, dementia, Parkinson's disease, chronic constipation and dysphagia x years, breast cancer s/p lumpectomy, brought in by family due to reduced oral intake and activity over the last week. Patient was originally brought to PCP office, at which point they had noted hypotension and tachycardia, and advised family to bring patient to ED. Per family, patient has been gradually decreasing PO intake, however has successfully been taking medications with some ounces of Ensure. Family otherwise denies all other complaints including fever, sweating, vomiting, diarrhea, shortness of breath. Per , patient's last bowel movement noted to be 4 days ago. Patient's family deny any recent medication changes, have noted that citalopram was gradually tapered by PCP. Patient is non-verbal and history is limited by dementia
Not applicable

## 2021-10-17 ENCOUNTER — INPATIENT (INPATIENT)
Facility: HOSPITAL | Age: 86
LOS: 2 days | Discharge: HOME | DRG: 872 | End: 2021-10-20
Attending: STUDENT IN AN ORGANIZED HEALTH CARE EDUCATION/TRAINING PROGRAM | Admitting: STUDENT IN AN ORGANIZED HEALTH CARE EDUCATION/TRAINING PROGRAM
Payer: COMMERCIAL

## 2021-10-17 VITALS
WEIGHT: 89.95 LBS | DIASTOLIC BLOOD PRESSURE: 80 MMHG | SYSTOLIC BLOOD PRESSURE: 128 MMHG | HEIGHT: 57 IN | OXYGEN SATURATION: 93 % | RESPIRATION RATE: 16 BRPM | HEART RATE: 70 BPM | TEMPERATURE: 98 F

## 2021-10-17 DIAGNOSIS — R56.9 UNSPECIFIED CONVULSIONS: ICD-10-CM

## 2021-10-17 DIAGNOSIS — Z98.890 OTHER SPECIFIED POSTPROCEDURAL STATES: Chronic | ICD-10-CM

## 2021-10-17 LAB
ALBUMIN SERPL ELPH-MCNC: 2.9 G/DL — LOW (ref 3.5–5)
ALP SERPL-CCNC: 108 U/L — SIGNIFICANT CHANGE UP (ref 40–120)
ALT FLD-CCNC: 18 U/L DA — SIGNIFICANT CHANGE UP (ref 10–60)
ANION GAP SERPL CALC-SCNC: 8 MMOL/L — SIGNIFICANT CHANGE UP (ref 5–17)
APPEARANCE UR: CLEAR — SIGNIFICANT CHANGE UP
AST SERPL-CCNC: 13 U/L — SIGNIFICANT CHANGE UP (ref 10–40)
BACTERIA # UR AUTO: ABNORMAL /HPF
BASOPHILS # BLD AUTO: 0.03 K/UL — SIGNIFICANT CHANGE UP (ref 0–0.2)
BASOPHILS NFR BLD AUTO: 0.3 % — SIGNIFICANT CHANGE UP (ref 0–2)
BILIRUB SERPL-MCNC: 0.4 MG/DL — SIGNIFICANT CHANGE UP (ref 0.2–1.2)
BILIRUB UR-MCNC: NEGATIVE — SIGNIFICANT CHANGE UP
BUN SERPL-MCNC: 16 MG/DL — SIGNIFICANT CHANGE UP (ref 7–18)
CALCIUM SERPL-MCNC: 8.2 MG/DL — LOW (ref 8.4–10.5)
CHLORIDE SERPL-SCNC: 106 MMOL/L — SIGNIFICANT CHANGE UP (ref 96–108)
CO2 SERPL-SCNC: 25 MMOL/L — SIGNIFICANT CHANGE UP (ref 22–31)
COLOR SPEC: YELLOW — SIGNIFICANT CHANGE UP
CREAT SERPL-MCNC: 0.77 MG/DL — SIGNIFICANT CHANGE UP (ref 0.5–1.3)
DIFF PNL FLD: ABNORMAL
EOSINOPHIL # BLD AUTO: 0.05 K/UL — SIGNIFICANT CHANGE UP (ref 0–0.5)
EOSINOPHIL NFR BLD AUTO: 0.5 % — SIGNIFICANT CHANGE UP (ref 0–6)
EPI CELLS # UR: ABNORMAL /HPF
GLUCOSE SERPL-MCNC: 146 MG/DL — HIGH (ref 70–99)
GLUCOSE UR QL: NEGATIVE — SIGNIFICANT CHANGE UP
HCT VFR BLD CALC: 39.7 % — SIGNIFICANT CHANGE UP (ref 34.5–45)
HGB BLD-MCNC: 12.8 G/DL — SIGNIFICANT CHANGE UP (ref 11.5–15.5)
IMM GRANULOCYTES NFR BLD AUTO: 0.3 % — SIGNIFICANT CHANGE UP (ref 0–1.5)
KETONES UR-MCNC: ABNORMAL
LACTATE SERPL-SCNC: 4.1 MMOL/L — CRITICAL HIGH (ref 0.7–2)
LEUKOCYTE ESTERASE UR-ACNC: ABNORMAL
LYMPHOCYTES # BLD AUTO: 1.06 K/UL — SIGNIFICANT CHANGE UP (ref 1–3.3)
LYMPHOCYTES # BLD AUTO: 11.5 % — LOW (ref 13–44)
MCHC RBC-ENTMCNC: 29.7 PG — SIGNIFICANT CHANGE UP (ref 27–34)
MCHC RBC-ENTMCNC: 32.2 GM/DL — SIGNIFICANT CHANGE UP (ref 32–36)
MCV RBC AUTO: 92.1 FL — SIGNIFICANT CHANGE UP (ref 80–100)
MONOCYTES # BLD AUTO: 0.77 K/UL — SIGNIFICANT CHANGE UP (ref 0–0.9)
MONOCYTES NFR BLD AUTO: 8.4 % — SIGNIFICANT CHANGE UP (ref 2–14)
NEUTROPHILS # BLD AUTO: 7.24 K/UL — SIGNIFICANT CHANGE UP (ref 1.8–7.4)
NEUTROPHILS NFR BLD AUTO: 79 % — HIGH (ref 43–77)
NITRITE UR-MCNC: NEGATIVE — SIGNIFICANT CHANGE UP
NRBC # BLD: 0 /100 WBCS — SIGNIFICANT CHANGE UP (ref 0–0)
PH UR: 7 — SIGNIFICANT CHANGE UP (ref 5–8)
PLATELET # BLD AUTO: 148 K/UL — LOW (ref 150–400)
POTASSIUM SERPL-MCNC: 3.8 MMOL/L — SIGNIFICANT CHANGE UP (ref 3.5–5.3)
POTASSIUM SERPL-SCNC: 3.8 MMOL/L — SIGNIFICANT CHANGE UP (ref 3.5–5.3)
PROT SERPL-MCNC: 7.2 G/DL — SIGNIFICANT CHANGE UP (ref 6–8.3)
PROT UR-MCNC: NEGATIVE — SIGNIFICANT CHANGE UP
RBC # BLD: 4.31 M/UL — SIGNIFICANT CHANGE UP (ref 3.8–5.2)
RBC # FLD: 13 % — SIGNIFICANT CHANGE UP (ref 10.3–14.5)
RBC CASTS # UR COMP ASSIST: ABNORMAL /HPF (ref 0–2)
SARS-COV-2 RNA SPEC QL NAA+PROBE: SIGNIFICANT CHANGE UP
SODIUM SERPL-SCNC: 139 MMOL/L — SIGNIFICANT CHANGE UP (ref 135–145)
SP GR SPEC: 1.01 — SIGNIFICANT CHANGE UP (ref 1.01–1.02)
TROPONIN I SERPL-MCNC: <0.015 NG/ML — SIGNIFICANT CHANGE UP (ref 0–0.04)
UROBILINOGEN FLD QL: NEGATIVE — SIGNIFICANT CHANGE UP
WBC # BLD: 9.18 K/UL — SIGNIFICANT CHANGE UP (ref 3.8–10.5)
WBC # FLD AUTO: 9.18 K/UL — SIGNIFICANT CHANGE UP (ref 3.8–10.5)
WBC UR QL: ABNORMAL /HPF (ref 0–5)

## 2021-10-17 PROCEDURE — 99223 1ST HOSP IP/OBS HIGH 75: CPT

## 2021-10-17 PROCEDURE — 71045 X-RAY EXAM CHEST 1 VIEW: CPT | Mod: 26

## 2021-10-17 PROCEDURE — 93010 ELECTROCARDIOGRAM REPORT: CPT

## 2021-10-17 PROCEDURE — 99285 EMERGENCY DEPT VISIT HI MDM: CPT

## 2021-10-17 PROCEDURE — 70450 CT HEAD/BRAIN W/O DYE: CPT | Mod: 26,MA

## 2021-10-17 RX ORDER — SODIUM CHLORIDE 9 MG/ML
1000 INJECTION INTRAMUSCULAR; INTRAVENOUS; SUBCUTANEOUS ONCE
Refills: 0 | Status: COMPLETED | OUTPATIENT
Start: 2021-10-17 | End: 2021-10-17

## 2021-10-17 RX ADMIN — SODIUM CHLORIDE 1000 MILLILITER(S): 9 INJECTION INTRAMUSCULAR; INTRAVENOUS; SUBCUTANEOUS at 23:28

## 2021-10-17 NOTE — H&P ADULT - PROBLEM SELECTOR PLAN 1
Pt presented to ED after two episodes of witnessed () seizures GTCS at home  On Friday started on baclofen (will HOLD for now considering new onset seizures)  No seizure episode in ED  Vitals stable  CT head showed no acute changes  Lactate 4.1  UA positive  started on IVF NS  follow up EEG   Neurology Dr. *** consulted Pt presented to ED after two episodes of witnessed () seizures GTCS at home  On Friday started on baclofen (will HOLD for now considering new onset seizures)  No seizure episode in ED  Vitals stable  CT head showed no acute changes  Lactate 4.1  UA positive  started on IVF NS  follow up EEG   Neurology Dr. Shook consulted

## 2021-10-17 NOTE — ED PROVIDER NOTE - CLINICAL SUMMARY MEDICAL DECISION MAKING FREE TEXT BOX
87 yr old female with hx of Parkinsons, dementia, HLD, HTN presents to ed via seizure. had GTC at 12p then 7p,  states lasted 10 mins. no fever, no trauma, no vomiting, no change in meds. pt require full assistance with ADL.     seizure episode x 2 per - r/o ich vs lytes imbalance vs uti vs ic mass vs pna vs arrhythmia- labs, ct , ua, ekg, cardiac monitor, cxr, admit

## 2021-10-17 NOTE — H&P ADULT - ATTENDING COMMENTS
Pt seen and examined.  Case discussed with MAR.  87 year old woman with PMH of Parkinson's disease, dementia, HLD, HTN brought in on account of witnessed sudden onset convulsive episode. No prior episode of seizure disorder.  ROS limited by cognitive deficits    Vital Signs Last 24 Hrs  T(C): 36.2 (17 Oct 2021 20:53), Max: 36.8 (17 Oct 2021 20:29)  T(F): 97.2 (17 Oct 2021 20:53), Max: 98.2 (17 Oct 2021 20:29)  HR: 70 (17 Oct 2021 20:29) (70 - 70)  BP: 128/80 (17 Oct 2021 20:29) (128/80 - 128/80)  RR: 16 (17 Oct 2021 20:29) (16 - 16)  SpO2: 93% (17 Oct 2021 20:29) (93% - 93%)    Labs                         12.8   9.18  )-----------( 148      ( 17 Oct 2021 21:14 )             39.7     10-17    139  |  106  |  16  --------------------<  146<H>  3.8   |  25  |  0.77    Ca    8.2<L>      17 Oct 2021 21:14  TPro  7.2  /  Alb  2.9<L>  /  TBili  0.4  /  DBili  x   /  AST  13  /  ALT  18  /  AlkPhos  108  10-17    Lactate - 4.1    - Urinalysis Basic - ( 17 Oct 2021 21:21 )  Color: Yellow / Appearance: Clear / S.015 / pH: x  Gluc: x / Ketone: Trace  / Bili: Negative / Urobili: Negative   Blood: x / Protein: Negative / Nitrite: Negative   Leuk Esterase: Small / RBC: 5-10 /HPF / WBC 6-10 /HPF   Sq Epi: x / Non Sq Epi: Moderate /HPF / Bacteria: Few /HPF    - Head CT   No acute intracranial hemorrhage    Impression   - Acute seizure episode   Etiology unclear   Limited history, findings of +ve urinalysis despite epithelial cells and microscopic hematuria.   Head CT and chemistry unremarkable   Lactate elevation might be related to recent seizure and less likely sepsis or dehydration.    - UTI    Plan   - Admit to Medicine   rule out secondary causes of seizure   No obvious metabolic changes, head trauma on CT   Possible urinary infection on UA.  Follow up urine and blood cultures  Empiric antibiotics with IV ceftriaxone 1g daily   EEG in AM  Neurology consult  Fall and seizure precaution  Reconcile and resume home medications. Pt seen and examined.  Case discussed with MAR.  87 year old woman with PMH of Parkinson's disease, dementia, HLD, HTN brought in on account of witnessed sudden onset convulsive episode. No prior episode of seizure disorder. Recent placement on baclofen for lower legs spasm.  ROS limited by cognitive deficits    Vital Signs Last 24 Hrs  T(C): 36.2 (17 Oct 2021 20:53), Max: 36.8 (17 Oct 2021 20:29)  T(F): 97.2 (17 Oct 2021 20:53), Max: 98.2 (17 Oct 2021 20:29)  HR: 70 (17 Oct 2021 20:29) (70 - 70)  BP: 128/80 (17 Oct 2021 20:29) (128/80 - 128/80)  RR: 16 (17 Oct 2021 20:29) (16 - 16)  SpO2: 93% (17 Oct 2021 20:29) (93% - 93%)    Labs                         12.8   9.18  )-----------( 148      ( 17 Oct 2021 21:14 )             39.7     10-17    139  |  106  |  16  --------------------<  146<H>  3.8   |  25  |  0.77    Ca    8.2<L>      17 Oct 2021 21:14  TPro  7.2  /  Alb  2.9<L>  /  TBili  0.4  /  DBili  x   /  AST  13  /  ALT  18  /  AlkPhos  108  10-17    Lactate - 4.1    - Urinalysis Basic - ( 17 Oct 2021 21:21 )  Color: Yellow / Appearance: Clear / S.015 / pH: x  Gluc: x / Ketone: Trace  / Bili: Negative / Urobili: Negative   Blood: x / Protein: Negative / Nitrite: Negative   Leuk Esterase: Small / RBC: 5-10 /HPF / WBC 6-10 /HPF   Sq Epi: x / Non Sq Epi: Moderate /HPF / Bacteria: Few /HPF    - Head CT   No acute intracranial hemorrhage    Impression   - Acute seizure episode   Etiology unclear   Limited history, findings of +ve urinalysis despite epithelial cells and microscopic hematuria.   Head CT and chemistry unremarkable   Lactate elevation might be related to recent seizure and less likely sepsis or dehydration.    - UTI  - Evaluation for long term placement    Plan   - Admit to Medicine   rule out secondary causes of seizure   No obvious metabolic changes, head trauma on CT   Possible urinary infection on UA.  Follow up urine and blood cultures  Empiric antibiotics with IV ceftriaxone 1g daily   EEG in AM  Neurology consult  Fall and seizure precaution  Reconcile and resume home medications.  Case management/SW consult Pt seen and examined.  Case discussed with MAR.  87 year old woman with PMH of Parkinson's disease, dementia, HLD, HTN brought in on account of witnessed sudden onset convulsive episode. No prior episode of seizure disorder. Recent placement on baclofen for lower legs spasm.  ROS limited by cognitive deficits    Vital Signs Last 24 Hrs  T(C): 36.2 (17 Oct 2021 20:53), Max: 36.8 (17 Oct 2021 20:29)  T(F): 97.2 (17 Oct 2021 20:53), Max: 98.2 (17 Oct 2021 20:29)  HR: 70 (17 Oct 2021 20:29) (70 - 70)  BP: 128/80 (17 Oct 2021 20:29) (128/80 - 128/80)  RR: 16 (17 Oct 2021 20:29) (16 - 16)  SpO2: 93% (17 Oct 2021 20:29) (93% - 93%)    Labs                         12.8   9.18  )-----------( 148      ( 17 Oct 2021 21:14 )             39.7     10-17    139  |  106  |  16  --------------------<  146<H>  3.8   |  25  |  0.77    Ca    8.2<L>      17 Oct 2021 21:14  TPro  7.2  /  Alb  2.9<L>  /  TBili  0.4  /  DBili  x   /  AST  13  /  ALT  18  /  AlkPhos  108  10-17    Lactate - 4.1    - Urinalysis Basic - ( 17 Oct 2021 21:21 )  Color: Yellow / Appearance: Clear / S.015 / pH: x  Gluc: x / Ketone: Trace  / Bili: Negative / Urobili: Negative   Blood: x / Protein: Negative / Nitrite: Negative   Leuk Esterase: Small / RBC: 5-10 /HPF / WBC 6-10 /HPF   Sq Epi: x / Non Sq Epi: Moderate /HPF / Bacteria: Few /HPF    - Head CT   No acute intracranial hemorrhage      Impression   - Acute seizure episode   Etiology unclear with sudden change in neuro status  Limited history, findings of +ve urinalysis despite epithelial cells and microscopic hematuria.   Head CT and chemistry unremarkable   Lactate elevation might be related to recent seizure and less likely sepsis or dehydration.    - UTI  - Evaluation for long term placement    Plan   - Admit to Medicine   rule out secondary causes of seizure   No obvious metabolic changes, head trauma on CT   Possible urinary infection on UA.  Follow up urine and blood cultures  Empiric antibiotics with IV ceftriaxone 1g daily   EEG in AM  Neurology consult  Fall and seizure precaution  Reconcile and resume home medications.  Case management/SW consult

## 2021-10-17 NOTE — H&P ADULT - PROBLEM SELECTOR PLAN 3
Pt has h/o parkinson's disease and dementia  On friday started on baclofen (will HOLD for now considering new onset seizures)  continued home meds of carbidopa/levodopa

## 2021-10-17 NOTE — H&P ADULT - ASSESSMENT
Admitted for seizures and UTI. 87 yr old F from home, non ambulatory, minimally verbal, AAOx1 at baseline with PMH of Parkinsons disease, dementia, HTN presents to ED after she had GTC seizures at 12pm then 7pm witnessed by  stated lasted 10 mins. Admitted for seizures and UTI.

## 2021-10-17 NOTE — H&P ADULT - NSICDXPASTMEDICALHX_GEN_ALL_CORE_FT
PAST MEDICAL HISTORY:  Breast cancer     Chronic constipation     Dementia     Dysphagia     HLD (hyperlipidemia)     HTN (hypertension)     Parkinson's disease

## 2021-10-17 NOTE — ED ADULT NURSE NOTE - OBJECTIVE STATEMENT
BIBA for s/p seizure, family denies h/o seizure, h/o parkinsons and dementia. Patient responsive to pain stimuli. Not in distress.

## 2021-10-17 NOTE — H&P ADULT - HISTORY OF PRESENT ILLNESS
87 yr old female with hx of Parkinsons, dementia, HLD, HTN presents to ed via seizure. had GTC at 12p then 7p,  states lasted 10 mins. no fever, no trauma, no vomiting, no change in meds. pt require full assistance with ADL. 87 yr old F from home, non ambulatory, minimally verbal, AAOx1 at baseline with PMH of Parkinsons disease, dementia, HTN presents to ED after she had GTC seizures at 12pm then 7pm witnessed by  stated lasted 10 mins. She had no fever, no trauma, no vomiting, no change in meds. Pt requires full assistance with ADLs.

## 2021-10-17 NOTE — ED PROVIDER NOTE - OBJECTIVE STATEMENT
87 yr old female with hx of Parkinsons, dementia, HLD, HTN presents to ed via seizure. had GTC at 12p then 7p,  states lasted 10 mins. no fever, no trauma, no vomiting, no change in meds. pt require full assistance with ADL.

## 2021-10-17 NOTE — H&P ADULT - PROBLEM SELECTOR PLAN 4
IMPROVE VTE Individual Risk Assessment  RISK                                                                Points  [  ] Previous VTE                                                  3  [  ] Thrombophilia                                               2  [  ] Lower limb paralysis                                      2        (unable to hold up >15 seconds)    [  ] Current Cancer                                              2         (within 6 months)  [x  ] Immobilization > 24 hrs                                1  [  ] ICU/CCU stay > 24 hours                              1  [x  ] Age > 60                                                      1  IMPROVE VTE Score _________2,   lovenox sq for DVT proph Stable

## 2021-10-17 NOTE — H&P ADULT - PROBLEM SELECTOR PLAN 6
IMPROVE VTE Individual Risk Assessment  RISK                                                                Points  [  ] Previous VTE                                                  3  [  ] Thrombophilia                                               2  [  ] Lower limb paralysis                                      2        (unable to hold up >15 seconds)    [  ] Current Cancer                                              2         (within 6 months)  [x  ] Immobilization > 24 hrs                                1  [  ] ICU/CCU stay > 24 hours                              1  [x  ] Age > 60                                                      1  IMPROVE VTE Score _________2,   lovenox sq for DVT proph

## 2021-10-17 NOTE — H&P ADULT - NSHPPHYSICALEXAM_GEN_ALL_CORE
Vital Signs Last 24 Hrs  T(C): 36.2 (17 Oct 2021 20:53), Max: 36.8 (17 Oct 2021 20:29)  T(F): 97.2 (17 Oct 2021 20:53), Max: 98.2 (17 Oct 2021 20:29)  HR: 70 (17 Oct 2021 20:29) (70 - 70)  BP: 128/80 (17 Oct 2021 20:29) (128/80 - 128/80)  RR: 16 (17 Oct 2021 20:29) (16 - 16)  SpO2: 93% (17 Oct 2021 20:29) (93% - 93%)    GENERAL: In mild distress  HEAD:  Atraumatic, Normocephalic  EYES: EOMI, PERRLA, conjunctiva and sclera clear  ENT: Moist mucous membranes  NECK: Supple, No JVD  CHEST/LUNG: Clear to auscultation bilaterally; No rales, rhonchi, wheezing, or rubs. Unlabored respirations  HEART: Regular rate and rhythm; No murmurs, rubs, or gallops  ABDOMEN: Bowel sounds present; Soft, Nontender, Nondistended. No hepatomegaly  EXTREMITIES:  2+ Peripheral Pulses, brisk capillary refill. No clubbing, cyanosis, or edema  NERVOUS SYSTEM:  Alert & Oriented X1, minimally verbal  MSK: constricted at all joints.  SKIN: No rashes or lesions

## 2021-10-18 DIAGNOSIS — E78.5 HYPERLIPIDEMIA, UNSPECIFIED: ICD-10-CM

## 2021-10-18 DIAGNOSIS — I10 ESSENTIAL (PRIMARY) HYPERTENSION: ICD-10-CM

## 2021-10-18 DIAGNOSIS — R56.9 UNSPECIFIED CONVULSIONS: ICD-10-CM

## 2021-10-18 DIAGNOSIS — Z29.9 ENCOUNTER FOR PROPHYLACTIC MEASURES, UNSPECIFIED: ICD-10-CM

## 2021-10-18 DIAGNOSIS — N39.0 URINARY TRACT INFECTION, SITE NOT SPECIFIED: ICD-10-CM

## 2021-10-18 DIAGNOSIS — G20 PARKINSON'S DISEASE: ICD-10-CM

## 2021-10-18 PROBLEM — R13.10 DYSPHAGIA, UNSPECIFIED: Chronic | Status: ACTIVE | Noted: 2018-09-21

## 2021-10-18 PROBLEM — F03.90 UNSPECIFIED DEMENTIA WITHOUT BEHAVIORAL DISTURBANCE: Chronic | Status: ACTIVE | Noted: 2018-09-21

## 2021-10-18 PROBLEM — C50.919 MALIGNANT NEOPLASM OF UNSPECIFIED SITE OF UNSPECIFIED FEMALE BREAST: Chronic | Status: ACTIVE | Noted: 2018-09-21

## 2021-10-18 PROBLEM — K59.09 OTHER CONSTIPATION: Chronic | Status: ACTIVE | Noted: 2018-09-21

## 2021-10-18 LAB
A1C WITH ESTIMATED AVERAGE GLUCOSE RESULT: 6 % — HIGH (ref 4–5.6)
ALBUMIN SERPL ELPH-MCNC: 2.7 G/DL — LOW (ref 3.5–5)
ALP SERPL-CCNC: 97 U/L — SIGNIFICANT CHANGE UP (ref 40–120)
ALT FLD-CCNC: 18 U/L DA — SIGNIFICANT CHANGE UP (ref 10–60)
ANION GAP SERPL CALC-SCNC: 3 MMOL/L — LOW (ref 5–17)
AST SERPL-CCNC: 11 U/L — SIGNIFICANT CHANGE UP (ref 10–40)
BASOPHILS # BLD AUTO: 0.03 K/UL — SIGNIFICANT CHANGE UP (ref 0–0.2)
BASOPHILS NFR BLD AUTO: 0.3 % — SIGNIFICANT CHANGE UP (ref 0–2)
BILIRUB SERPL-MCNC: 0.5 MG/DL — SIGNIFICANT CHANGE UP (ref 0.2–1.2)
BUN SERPL-MCNC: 12 MG/DL — SIGNIFICANT CHANGE UP (ref 7–18)
CALCIUM SERPL-MCNC: 8 MG/DL — LOW (ref 8.4–10.5)
CHLORIDE SERPL-SCNC: 110 MMOL/L — HIGH (ref 96–108)
CHOLEST SERPL-MCNC: 143 MG/DL — SIGNIFICANT CHANGE UP
CO2 SERPL-SCNC: 27 MMOL/L — SIGNIFICANT CHANGE UP (ref 22–31)
CREAT SERPL-MCNC: 0.6 MG/DL — SIGNIFICANT CHANGE UP (ref 0.5–1.3)
EOSINOPHIL # BLD AUTO: 0.04 K/UL — SIGNIFICANT CHANGE UP (ref 0–0.5)
EOSINOPHIL NFR BLD AUTO: 0.3 % — SIGNIFICANT CHANGE UP (ref 0–6)
ESTIMATED AVERAGE GLUCOSE: 126 MG/DL — HIGH (ref 68–114)
GLUCOSE SERPL-MCNC: 114 MG/DL — HIGH (ref 70–99)
HCT VFR BLD CALC: 37.8 % — SIGNIFICANT CHANGE UP (ref 34.5–45)
HDLC SERPL-MCNC: 42 MG/DL — LOW
HGB BLD-MCNC: 12.5 G/DL — SIGNIFICANT CHANGE UP (ref 11.5–15.5)
IMM GRANULOCYTES NFR BLD AUTO: 0.3 % — SIGNIFICANT CHANGE UP (ref 0–1.5)
LACTATE SERPL-SCNC: 1.4 MMOL/L — SIGNIFICANT CHANGE UP (ref 0.7–2)
LIPID PNL WITH DIRECT LDL SERPL: 86 MG/DL — SIGNIFICANT CHANGE UP
LYMPHOCYTES # BLD AUTO: 1.15 K/UL — SIGNIFICANT CHANGE UP (ref 1–3.3)
LYMPHOCYTES # BLD AUTO: 9.7 % — LOW (ref 13–44)
MAGNESIUM SERPL-MCNC: 1.9 MG/DL — SIGNIFICANT CHANGE UP (ref 1.6–2.6)
MCHC RBC-ENTMCNC: 29.9 PG — SIGNIFICANT CHANGE UP (ref 27–34)
MCHC RBC-ENTMCNC: 33.1 GM/DL — SIGNIFICANT CHANGE UP (ref 32–36)
MCV RBC AUTO: 90.4 FL — SIGNIFICANT CHANGE UP (ref 80–100)
MONOCYTES # BLD AUTO: 0.83 K/UL — SIGNIFICANT CHANGE UP (ref 0–0.9)
MONOCYTES NFR BLD AUTO: 7 % — SIGNIFICANT CHANGE UP (ref 2–14)
NEUTROPHILS # BLD AUTO: 9.77 K/UL — HIGH (ref 1.8–7.4)
NEUTROPHILS NFR BLD AUTO: 82.4 % — HIGH (ref 43–77)
NON HDL CHOLESTEROL: 101 MG/DL — SIGNIFICANT CHANGE UP
NRBC # BLD: 0 /100 WBCS — SIGNIFICANT CHANGE UP (ref 0–0)
PHOSPHATE SERPL-MCNC: 2.6 MG/DL — SIGNIFICANT CHANGE UP (ref 2.5–4.5)
PLATELET # BLD AUTO: 151 K/UL — SIGNIFICANT CHANGE UP (ref 150–400)
POTASSIUM SERPL-MCNC: 4.1 MMOL/L — SIGNIFICANT CHANGE UP (ref 3.5–5.3)
POTASSIUM SERPL-SCNC: 4.1 MMOL/L — SIGNIFICANT CHANGE UP (ref 3.5–5.3)
PROT SERPL-MCNC: 6.8 G/DL — SIGNIFICANT CHANGE UP (ref 6–8.3)
RBC # BLD: 4.18 M/UL — SIGNIFICANT CHANGE UP (ref 3.8–5.2)
RBC # FLD: 13 % — SIGNIFICANT CHANGE UP (ref 10.3–14.5)
SODIUM SERPL-SCNC: 140 MMOL/L — SIGNIFICANT CHANGE UP (ref 135–145)
TRIGL SERPL-MCNC: 75 MG/DL — SIGNIFICANT CHANGE UP
TSH SERPL-MCNC: 0.59 UU/ML — SIGNIFICANT CHANGE UP (ref 0.34–4.82)
WBC # BLD: 11.86 K/UL — HIGH (ref 3.8–10.5)
WBC # FLD AUTO: 11.86 K/UL — HIGH (ref 3.8–10.5)

## 2021-10-18 PROCEDURE — 99233 SBSQ HOSP IP/OBS HIGH 50: CPT

## 2021-10-18 PROCEDURE — 99221 1ST HOSP IP/OBS SF/LOW 40: CPT

## 2021-10-18 RX ORDER — SODIUM CHLORIDE 9 MG/ML
1000 INJECTION INTRAMUSCULAR; INTRAVENOUS; SUBCUTANEOUS
Refills: 0 | Status: DISCONTINUED | OUTPATIENT
Start: 2021-10-18 | End: 2021-10-20

## 2021-10-18 RX ORDER — CARBIDOPA AND LEVODOPA 25; 100 MG/1; MG/1
1.5 TABLET ORAL
Refills: 0 | Status: DISCONTINUED | OUTPATIENT
Start: 2021-10-18 | End: 2021-10-20

## 2021-10-18 RX ORDER — CEFTRIAXONE 500 MG/1
1000 INJECTION, POWDER, FOR SOLUTION INTRAMUSCULAR; INTRAVENOUS EVERY 24 HOURS
Refills: 0 | Status: COMPLETED | OUTPATIENT
Start: 2021-10-18 | End: 2021-10-20

## 2021-10-18 RX ORDER — ENOXAPARIN SODIUM 100 MG/ML
40 INJECTION SUBCUTANEOUS DAILY
Refills: 0 | Status: DISCONTINUED | OUTPATIENT
Start: 2021-10-18 | End: 2021-10-20

## 2021-10-18 RX ORDER — FAMOTIDINE 10 MG/ML
20 INJECTION INTRAVENOUS DAILY
Refills: 0 | Status: DISCONTINUED | OUTPATIENT
Start: 2021-10-18 | End: 2021-10-20

## 2021-10-18 RX ORDER — CARBIDOPA AND LEVODOPA 25; 100 MG/1; MG/1
1.5 TABLET ORAL
Qty: 0 | Refills: 0 | DISCHARGE

## 2021-10-18 RX ORDER — ATORVASTATIN CALCIUM 80 MG/1
10 TABLET, FILM COATED ORAL AT BEDTIME
Refills: 0 | Status: DISCONTINUED | OUTPATIENT
Start: 2021-10-18 | End: 2021-10-20

## 2021-10-18 RX ADMIN — CARBIDOPA AND LEVODOPA 1.5 TABLET(S): 25; 100 TABLET ORAL at 13:45

## 2021-10-18 RX ADMIN — CARBIDOPA AND LEVODOPA 1.5 TABLET(S): 25; 100 TABLET ORAL at 21:42

## 2021-10-18 RX ADMIN — SODIUM CHLORIDE 60 MILLILITER(S): 9 INJECTION INTRAMUSCULAR; INTRAVENOUS; SUBCUTANEOUS at 02:00

## 2021-10-18 RX ADMIN — ENOXAPARIN SODIUM 40 MILLIGRAM(S): 100 INJECTION SUBCUTANEOUS at 11:44

## 2021-10-18 RX ADMIN — ATORVASTATIN CALCIUM 10 MILLIGRAM(S): 80 TABLET, FILM COATED ORAL at 21:41

## 2021-10-18 RX ADMIN — CARBIDOPA AND LEVODOPA 1.5 TABLET(S): 25; 100 TABLET ORAL at 18:20

## 2021-10-18 RX ADMIN — CEFTRIAXONE 100 MILLIGRAM(S): 500 INJECTION, POWDER, FOR SOLUTION INTRAMUSCULAR; INTRAVENOUS at 02:00

## 2021-10-18 NOTE — PROGRESS NOTE ADULT - ASSESSMENT
87 yr old F from home, non ambulatory, minimally verbal, AAOx1 at baseline with PMH of Parkinsons disease, dementia, HTN presents

## 2021-10-18 NOTE — PROGRESS NOTE ADULT - ASSESSMENT
Patient is an 87 year old Female with a past medical history of Parkinson's Disease, dementia, hypertension and hyperlipidemia presenting to the ED after two separate GTC seizures witnessed by her  at 12pm and 7pm. Both seizures were reported to have lasted approximately ten minutes. Today, patient has been resting well, and wakes from sleep infrequently. Patient is AAOx0 in comparison to yesterday, where she was AAOx1, at her baseline. Patient's vital signs are stable and consistent with those measured yesterday. Overall, patient appears in similar condition to yesterday. Since admission, no additional GCT seizures have occurred.  Patient is an 87 year old Female with a past medical history of Parkinson's Disease, dementia, hypertension and hyperlipidemia presenting to the ED after two separate GTC seizures witnessed by her  at 12pm and 7pm. Both seizures were reported to have lasted approximately ten minutes. Today, patient has been resting well, and wakes from sleep infrequently. Patient is AAOx0 in comparison to yesterday, where she was AAOx1, at her baseline. Patient's vital signs are stable and consistent with those measured yesterday. Lab values were consistent as well, with exception of lactate, which decreased significantly from 4.1 to 1.4. Overall, patient appears in similar condition to yesterday. Since admission, no additional GCT seizures have occurred.

## 2021-10-18 NOTE — CONSULT NOTE ADULT - ASSESSMENT
86yo female w/ two witnessed seizures    Recommendations:     88yo female w/ Parkinson's disease and UTI    Recommendations:    -  May discontinue EEG as history not c/w seizure.      - Continued mgmt per primary team.      - Maintain fall and seizure precautions         88yo female w/ Parkinson's disease and UTI    Recommendations:    -  May discontinue EEG as history not c/w seizure.      - Continued mgmt per primary team.      - Maintain fall and seizure precautions      NEUROLOGY ATTENDING ADDENDUM    I personally interviewed, examined, and participated in the care of this patient, on rounds 10/18/21 with NP Diogenes Craig.  Reviewed findings and assesment with her, Pt's  and daughter, and the medical team.  In addition to or instead of the Hx and findings and plan reported above, or in particular, I note as follows:    Pt typically gets distressed at night when it's time to go to sleep because she has pain, including neck pain, which interferes with sleep.  She shakes her forearms ( demonstrates fast low amplitude movement of both forearms) AWAKE, moaning, +/-crying.  He was very concerned, however, yesterday evening because the shaking went on for longer than usual (10 mins), and his wife appeared to be in greater distress than usual.   She was crying, moaning, tremulous and awake.      They had gone to her neurologist's office on 10/15/21 because of the pain and stiffness, seeking medication to reduce the stiffness .  Her regular neurologist was out sick; they were seen by another neurologist who prescribed a low dose of baclofen (5mg) to be given only once daily HS "to see if it would help."  It seems the HS dosing was because of her difficulty falling asleep.      She was last able to walk on her own (with assistive device) about a year ago.  Now can get about only with assistance.  Has been non-verbal for some time.  Bilingual [place] and English.      On examination she is awake, overweight.  Keeps eyelids closed; resists passive lid opening.  Only fleeting glimpses of eyes could be obtained: pupils 3mm reactive.  Eyes move at least L and R; cannot assess for conjugacy.  Feet are plantar flexed with only about 30 degrees of passive forced dorsiflexion elicitable.  Tonus moderately increased bilaterally, more in LEs.  Knees can be (almost) fully extended, slowly and with some effort.        DISCUSSION / ASSESSMENT / RECOMMENDATIONS    She DID NOT have a seizure.  She trembled due to pain and frustration.      No indication for EEG; no further neuroimaging.      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  These despite daily stretching performed by family and home aide.  Home notes that local heat seems to help.      PT evaluation for contractures; advice for home management.      Baclofen can be helpful for involuntary SPASMS due to UMN (in particular of spinal cord) lesions.  Definitely less helpful for UMN-related hypertonicity.  Essentially useless for muscle contractures, which are what bedevil this Pt.  More useful than baclofen would be moist warmth, massage, gentle stretching, OTC analgesic (APAP).                                86yo female w/ Parkinson's disease and UTI    Recommendations:    -  May discontinue EEG as history not c/w seizure.      - Continued mgmt per primary team.      - Maintain fall and seizure precautions      NEUROLOGY ATTENDING ADDENDUM    I personally interviewed, examined, and participated in the care of this patient, on rounds 10/18/21 with NP Diogenes Craig.  Reviewed findings and assesment with her, Pt's  and daughter, and the medical team.  In addition to or instead of the Hx and findings and plan reported above, or in particular, I note as follows:    Pt typically gets distressed at night when it's time to go to sleep because she has pain, including neck pain, which interferes with sleep.  She shakes her forearms ( demonstrates fast low amplitude movement of both forearms) and is AWAKE, moaning, +/-crying.  He and daughter report she gets very anxious, and that it leads to shaking, which she has been happening for years.  He was very concerned, however, yesterday evening because the shaking went on for longer than usual (10 mins), and his wife appeared to be in greater distress than usual.  She was crying, moaning, tremulous and awake.      They had gone to her neurologist's office on 10/15/21 because of the pain and stiffness, seeking medication to reduce the stiffness .  Her regular neurologist was out sick; they were seen by another neurologist who prescribed a low dose of baclofen (5mg) to be given only once daily HS "to see if it would help."  It seems the HS dosing was because of her difficulty falling asleep.      She was last able to walk on her own (with assistive device) about a year ago.  Now can get about only with assistance.  Has been non-verbal for some time.  Bilingual Kis[place]ili and English.      On examination she is awake, overweight.  Keeps eyelids closed; resists passive lid opening.  Only fleeting glimpses of eyes could be obtained: pupils 3mm reactive.  Eyes move at least L and R; cannot assess for conjugacy.  Feet are plantar flexed with only about 30 degrees of passive forced dorsiflexion elicitable.  Tonus moderately increased bilaterally, more in LEs.  Knees can be (almost) fully extended, slowly and with some effort.        DISCUSSION / ASSESSMENT / RECOMMENDATIONS    She DID NOT have a seizure.  She trembled due to pain and frustration, and anxiety.      No indication for EEG; no further neuroimaging.      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  These despite daily stretching performed by family and home aide.  Home notes that local heat seems to help.      PT evaluation for contractures; advice for home management.      Baclofen can be helpful for involuntary SPASMS due to UMN (in particular of spinal cord) lesions.  Definitely less helpful for UMN-related hypertonicity.  Essentially useless for muscle contractures, which are what bedevil this Pt.  More useful than baclofen would be moist warmth, massage, gentle stretching, OTC analgesic (APAP).   A 5mg dose is very unlikely to produce any evident results.  No strong objection to trying.

## 2021-10-18 NOTE — CONSULT NOTE ADULT - SUBJECTIVE AND OBJECTIVE BOX
+++++++++++++++++++++++++++NOTE NOT COMPLETED++++++++++++++++++++++++++++++++++++++    Patient is a 87y old  Female who presents with a chief complaint of Seizures (17 Oct 2021 22:57)      HPI:  87 yr old F from home, non ambulatory, minimally verbal, AAOx1 at baseline with PMH of Parkinsons disease, dementia, HTN presents to ED after she had GTC seizures at 12pm then 7pm witnessed by  stated lasted 10 mins. She had no fever, no trauma, no vomiting, no change in meds. Pt requires full assistance with ADLs. (17 Oct 2021 22:57)         The seizure is described as tonic clonic x 2  Seizure onset 10/17/21  The frequency of seizure is unknown   The seizure is brought up by unknown   The patient has been previously on no medications, no history of seizures     History of head trauma/ concussion:  Unknown   History of meningitis: Unknown   History of febrile seizures: Unknown   Family history of seizures: Unknown     Neurological Review of Systems:  (+) Difficulty with language.  No vision loss or double vision.  No dizziness, vertigo or new hearing loss.  (+) Difficulty with speech or swallowing.  No focal weakness.  No focal sensory changes.  No numbness or tingling in the bilateral lower extremities.  (+) Difficulty with balance.  (+) Difficulty with ambulation.        MEDICATIONS  (STANDING):  carbidopa/levodopa  25/100 1.5 Tablet(s) Oral <User Schedule>  cefTRIAXone   IVPB 1000 milliGRAM(s) IV Intermittent every 24 hours  enoxaparin Injectable 40 milliGRAM(s) SubCutaneous daily  sodium chloride 0.9%. 1000 milliLiter(s) (60 mL/Hr) IV Continuous <Continuous>    MEDICATIONS  (PRN):    Allergies    No Known Allergies    Intolerances      PAST MEDICAL & SURGICAL HISTORY:  HTN (hypertension)    HLD (hyperlipidemia)    Parkinson&#x27;s disease    Dementia    Breast cancer    Chronic constipation    Dysphagia    H/O lumpectomy      FAMILY HISTORY:  No pertinent family history in first degree relatives      SOCIAL HISTORY: non smoker    Review of Systems:  Limited in lethargic & demented pt   Constitutional: No generalized weakness. No fevers or chills.                    Eyes, Ears, Mouth, Throat: No vision loss   Respiratory: No shortness of breath or cough.                                Cardiovascular: No chest pain or palpitations  Gastrointestinal: No nausea or vomiting.                                         Genitourinary: No urinary incontinence or burning on urination.  Musculoskeletal: No joint pain.                                                           Dermatologic: No rash.  Neurological: as per HPI                                                                      Psychiatric: No behavioral problems.  Endocrine: No known hypoglycemia.               Hematologic/Lymphatic: No easy bleeding.    O:  Vital Signs Last 24 Hrs  T(C): 37 (18 Oct 2021 05:21), Max: 37.6 (18 Oct 2021 02:01)  T(F): 98.6 (18 Oct 2021 05:21), Max: 99.6 (18 Oct 2021 02:01)  HR: 88 (18 Oct 2021 05:21) (70 - 88)  BP: 151/83 (18 Oct 2021 05:21) (128/80 - 151/83)  BP(mean): --  RR: 18 (18 Oct 2021 05:21) (16 - 18)  SpO2: 96% (18 Oct 2021 05:21) (93% - 96%)    General Exam: Limited in lethargic & demented pt  General appearance: No acute distress                 Cardiovascular: Pedal dorsalis pulses intact bilaterally    Mental Status: Lethargic.  Attention impaired.      Cranial Nerves: CN I - not tested.  PERRL, EOMI, VFF, no nystagmus or diplopia.  No APD.  Fundi not visualized.  CN V1-3 intact to light touch.  No facial asymmetry.  Hearing intact to finger rub bilaterally.  Tongue, uvula and palate midline.  Sternocleidomastoid and Trapezius intact bilaterally.    Motor:   Tone: Rigid                 Strength impaired in all extremities   Pronator drift unable to assess-does not follow command               Dysmetria unable to assess-does not follow command  No truncal ataxia.  No resting, postural or action tremor.  No myoclonus.    Sensation: Intact to light touch    Deep Tendon Reflexes: 1+ bilateral biceps, triceps, brachioradialis, knee.  Mute bilateral ankles  Toes flexor bilaterally    Gait: Bedbound     Other:     LABS:                        12.5   11.86 )-----------( 151      ( 18 Oct 2021 06:10 )             37.8     10-18    140  |  110<H>  |  12  ----------------------------<  114<H>  4.1   |  27  |  0.60    Ca    8.0<L>      18 Oct 2021 06:10  Phos  2.6     10-  Mg     1.9     10-18    TPro  6.8  /  Alb  2.7<L>  /  TBili  0.5  /  DBili  x   /  AST  11  /  ALT  18  /  AlkPhos  97  10-18      Urinalysis Basic - ( 17 Oct 2021 21:21 )    Color: Yellow / Appearance: Clear / S.015 / pH: x  Gluc: x / Ketone: Trace  / Bili: Negative / Urobili: Negative   Blood: x / Protein: Negative / Nitrite: Negative   Leuk Esterase: Small / RBC: 5-10 /HPF / WBC 6-10 /HPF   Sq Epi: x / Non Sq Epi: Moderate /HPF / Bacteria: Few /HPF        RADIOLOGY & ADDITIONAL STUDIES:  ct< from: CT Head No Cont (10.17.21 @ 21:55) >  EXAM:  CT BRAIN                            PROCEDURE DATE:  10/17/2021          INTERPRETATION:  CT HEAD WITHOUT CONTRAST    INDICATION: 87 years old. Female. seizure.    COMPARISON: 2018.    TECHNIQUE: Noncontrast axial CT head was obtained from the skull base to vertex.    FINDINGS:  No acute intracranial hemorrhage, mass effect or midline shift.  No CT evidence of acute large vascular territory infarct.  The ventricles and cortical sulci are prominent reflecting parenchymal volume loss.  Patchy hypodensities in the periventricular white matter are nonspecific, but likely sequela of small vessel ischemic disease.    The paranasal sinuses and mastoid air cells are well aerated. The native ocular lenses are surgically absent.  No displaced calvarial fracture.    IMPRESSION:  No acute intracranial hemorrhage or mass effect.    --- End of Report ---            CAMERON CARDONA MD; Attending Radiologist  This document has been electronically signed. Oct 17 2021  9:58PM    < end of copied text >   Patient is a 87y old  Female who presents with a chief complaint of Seizures (17 Oct 2021 22:57)      HPI:  87 yr old F from home, non ambulatory, minimally verbal, AAOx1 at baseline with PMH of Parkinsons disease, dementia, HTN presents to ED after she had GTC seizures at 12pm then 7pm witnessed by  stated lasted 10 mins. She had no fever, no trauma, no vomiting, no change in meds. Pt requires full assistance with ADLs. (17 Oct 2021 22:57)    Upon revisit, dtr and  were at the bedside.  Per family pt's shaking is chronic.  However, family reports the shaking lasted longer this time.  Denies LOC, tongue bite, loss of urine or bowels.      The seizure is described as tonic clonic x 2  Seizure onset 10/17/21  The frequency of seizure is unknown   The seizure is brought up by unknown   The patient has been previously on no medications, no history of seizures     History of head trauma/ concussion:  Unknown   History of meningitis: Unknown   History of febrile seizures: Unknown   Family history of seizures: Unknown     Neurological Review of Systems:  (+) Difficulty with language.  No vision loss or double vision.  No dizziness, vertigo or new hearing loss.  (+) Difficulty with speech or swallowing.  No focal weakness.  No focal sensory changes.  No numbness or tingling in the bilateral lower extremities.  (+) Difficulty with balance.  (+) Difficulty with ambulation.        MEDICATIONS  (STANDING):  carbidopa/levodopa  25/100 1.5 Tablet(s) Oral <User Schedule>  cefTRIAXone   IVPB 1000 milliGRAM(s) IV Intermittent every 24 hours  enoxaparin Injectable 40 milliGRAM(s) SubCutaneous daily  sodium chloride 0.9%. 1000 milliLiter(s) (60 mL/Hr) IV Continuous <Continuous>    MEDICATIONS  (PRN):    Allergies    No Known Allergies    Intolerances      PAST MEDICAL & SURGICAL HISTORY:  HTN (hypertension)    HLD (hyperlipidemia)    Parkinson&#x27;s disease    Dementia    Breast cancer    Chronic constipation    Dysphagia    H/O lumpectomy      FAMILY HISTORY:  No pertinent family history in first degree relatives      SOCIAL HISTORY: non smoker    Review of Systems:  Limited in lethargic & demented pt   Constitutional: No generalized weakness. No fevers or chills.                    Eyes, Ears, Mouth, Throat: No vision loss   Respiratory: No shortness of breath or cough.                                Cardiovascular: No chest pain or palpitations  Gastrointestinal: No nausea or vomiting.                                         Genitourinary: No urinary incontinence or burning on urination.  Musculoskeletal: No joint pain.                                                           Dermatologic: No rash.  Neurological: as per HPI                                                                      Psychiatric: No behavioral problems.  Endocrine: No known hypoglycemia.               Hematologic/Lymphatic: No easy bleeding.    O:  Vital Signs Last 24 Hrs  T(C): 37 (18 Oct 2021 05:21), Max: 37.6 (18 Oct 2021 02:01)  T(F): 98.6 (18 Oct 2021 05:21), Max: 99.6 (18 Oct 2021 02:01)  HR: 88 (18 Oct 2021 05:21) (70 - 88)  BP: 151/83 (18 Oct 2021 05:21) (128/80 - 151/83)  RR: 18 (18 Oct 2021 05:21) (16 - 18)  SpO2: 96% (18 Oct 2021 05:21) (93% - 96%)    General Exam: Limited in lethargic & demented pt  General appearance: No acute distress                 Cardiovascular: Pedal dorsalis pulses intact bilaterally    Mental Status: Lethargic.  Attention impaired.      Cranial Nerves: CN I - not tested.  PERRL, EOMI, VFF, no nystagmus or diplopia.  No APD.  Fundi not visualized.  CN V1-3 intact to light touch.  No facial asymmetry.  Hearing intact to finger rub bilaterally.  Tongue, uvula and palate midline.  Sternocleidomastoid and Trapezius intact bilaterally.    Motor:   Tone: Rigid.  B/l LE contractures.              Strength impaired in all extremities   Pronator drift unable to assess-does not follow command               Dysmetria unable to assess-does not follow command  No truncal ataxia.  No resting, postural or action tremor.  No myoclonus.    Sensation: Intact to light touch    Deep Tendon Reflexes: 1+ bilateral biceps, triceps, brachioradialis, knee.  Mute bilateral ankles  Toes flexor bilaterally    Gait: Bedbound     Other:     LABS:                        12.5   11.86 )-----------( 151      ( 18 Oct 2021 06:10 )             37.8     10-18    140  |  110<H>  |  12  ----------------------------<  114<H>  4.1   |  27  |  0.60    Ca    8.0<L>      18 Oct 2021 06:10  Phos  2.6     10-  Mg     1.9     10-18    TPro  6.8  /  Alb  2.7<L>  /  TBili  0.5  /  DBili  x   /  AST  11  /  ALT  18  /  AlkPhos  97  10-18      Urinalysis Basic - ( 17 Oct 2021 21:21 )    Color: Yellow / Appearance: Clear / S.015 / pH: x  Gluc: x / Ketone: Trace  / Bili: Negative / Urobili: Negative   Blood: x / Protein: Negative / Nitrite: Negative   Leuk Esterase: Small / RBC: 5-10 /HPF / WBC 6-10 /HPF   Sq Epi: x / Non Sq Epi: Moderate /HPF / Bacteria: Few /HPF        RADIOLOGY & ADDITIONAL STUDIES:  ct< from: CT Head No Cont (10.17.21 @ 21:55) >  EXAM:  CT BRAIN                            PROCEDURE DATE:  10/17/2021          INTERPRETATION:  CT HEAD WITHOUT CONTRAST    INDICATION: 87 years old. Female. seizure.    COMPARISON: 2018.    TECHNIQUE: Noncontrast axial CT head was obtained from the skull base to vertex.    FINDINGS:  No acute intracranial hemorrhage, mass effect or midline shift.  No CT evidence of acute large vascular territory infarct.  The ventricles and cortical sulci are prominent reflecting parenchymal volume loss.  Patchy hypodensities in the periventricular white matter are nonspecific, but likely sequela of small vessel ischemic disease.    The paranasal sinuses and mastoid air cells are well aerated. The native ocular lenses are surgically absent.  No displaced calvarial fracture.    IMPRESSION:  No acute intracranial hemorrhage or mass effect.    --- End of Report ---            CAMERON CARDONA MD; Attending Radiologist  This document has been electronically signed. Oct 17 2021  9:58PM    < end of copied text >   Patient is a 87y old  Female who presents with a chief complaint of Seizures (17 Oct 2021 22:57)      HPI:  87 yr old F from home, non ambulatory, minimally verbal, AAOx1 at baseline with PMH of Parkinsons disease, dementia, HTN presents to ED after she had GTC seizures at 12pm then 7pm witnessed by  stated lasted 10 mins. She had no fever, no trauma, no vomiting, no change in meds. Pt requires full assistance with ADLs. (17 Oct 2021 22:57)    Upon revisit, dtr and  were at the bedside.  Per family pt's shaking is chronic.  However, family reports the shaking lasted longer this time.  Denies LOC, tongue bite, loss of urine or bowels.      The "seizure" is described as forearm shaking See Attending Addendum  "Seizure" onset 10/17/21  The frequency of seizure is unknown   The seizure is brought up by unknown   The patient has been previously on no medications, no history of seizures     History of head trauma/ concussion:  Unknown   History of meningitis: Unknown   History of febrile seizures: Unknown   Family history of seizures: Unknown     Neurological Review of Systems:  (+) Difficulty with language.  No vision loss or double vision.  No dizziness, vertigo or new hearing loss.  (+) Difficulty with speech or swallowing.  No focal weakness.  No focal sensory changes.  No numbness or tingling in the bilateral lower extremities.  (+) Difficulty with balance.  (+) Difficulty with ambulation.        MEDICATIONS  (STANDING):  carbidopa/levodopa  25/100 1.5 Tablet(s) Oral <User Schedule>  cefTRIAXone   IVPB 1000 milliGRAM(s) IV Intermittent every 24 hours  enoxaparin Injectable 40 milliGRAM(s) SubCutaneous daily  sodium chloride 0.9%. 1000 milliLiter(s) (60 mL/Hr) IV Continuous <Continuous>    MEDICATIONS  (PRN):    Allergies    No Known Allergies    Intolerances      PAST MEDICAL & SURGICAL HISTORY:  HTN (hypertension)    HLD (hyperlipidemia)    Parkinson&#x27;s disease    Dementia    Breast cancer    Chronic constipation    Dysphagia    H/O lumpectomy      FAMILY HISTORY:  No pertinent family history in first degree relatives      SOCIAL HISTORY: non smoker    Review of Systems:  Limited in lethargic & demented pt   Constitutional: No generalized weakness. No fevers or chills.                    Eyes, Ears, Mouth, Throat: No vision loss   Respiratory: No shortness of breath or cough.                                Cardiovascular: No chest pain or palpitations  Gastrointestinal: No nausea or vomiting.                                         Genitourinary: No urinary incontinence or burning on urination.  Musculoskeletal: No joint pain.                                                           Dermatologic: No rash.  Neurological: as per HPI                                                                      Psychiatric: No behavioral problems.  Endocrine: No known hypoglycemia.               Hematologic/Lymphatic: No easy bleeding.    O:  Vital Signs Last 24 Hrs  T(C): 37 (18 Oct 2021 05:21), Max: 37.6 (18 Oct 2021 02:01)  T(F): 98.6 (18 Oct 2021 05:21), Max: 99.6 (18 Oct 2021 02:01)  HR: 88 (18 Oct 2021 05:21) (70 - 88)  BP: 151/83 (18 Oct 2021 05:21) (128/80 - 151/83)  RR: 18 (18 Oct 2021 05:21) (16 - 18)  SpO2: 96% (18 Oct 2021 05:21) (93% - 96%)    General Exam: Limited in lethargic & demented pt  General appearance: No acute distress                 Cardiovascular: Pedal dorsalis pulses intact bilaterally    Mental Status: Lethargic.  Attention impaired.      Cranial Nerves: CN I - not tested.  PERRL, EOMI, VFF, no nystagmus or diplopia.  No APD.  Fundi not visualized.  CN V1-3 intact to light touch.  No facial asymmetry.  Hearing intact to finger rub bilaterally.  Tongue, uvula and palate midline.  Sternocleidomastoid and Trapezius intact bilaterally.    Motor:   Tone: Rigid.  B/l LE contractures.              Strength impaired in all extremities   Pronator drift unable to assess-does not follow command               Dysmetria unable to assess-does not follow command  No truncal ataxia.  No resting, postural or action tremor.  No myoclonus.    Sensation: Intact to light touch    Deep Tendon Reflexes: 1+ bilateral biceps, triceps, brachioradialis, knee.  Mute bilateral ankles  Toes flexor bilaterally    Gait: Bedbound     Other:     LABS:                        12.5   11.86 )-----------( 151      ( 18 Oct 2021 06:10 )             37.8     10-18    140  |  110<H>  |  12  ----------------------------<  114<H>  4.1   |  27  |  0.60    Ca    8.0<L>      18 Oct 2021 06:10  Phos  2.6     10-18  Mg     1.9     10-18    TPro  6.8  /  Alb  2.7<L>  /  TBili  0.5  /  DBili  x   /  AST  11  /  ALT  18  /  AlkPhos  97  10-18      Urinalysis Basic - ( 17 Oct 2021 21:21 )    Color: Yellow / Appearance: Clear / S.015 / pH: x  Gluc: x / Ketone: Trace  / Bili: Negative / Urobili: Negative   Blood: x / Protein: Negative / Nitrite: Negative   Leuk Esterase: Small / RBC: 5-10 /HPF / WBC 6-10 /HPF   Sq Epi: x / Non Sq Epi: Moderate /HPF / Bacteria: Few /HPF        RADIOLOGY & ADDITIONAL STUDIES:  ct< from: CT Head No Cont (10.17.21 @ 21:55) >  EXAM:  CT BRAIN                            PROCEDURE DATE:  10/17/2021          INTERPRETATION:  CT HEAD WITHOUT CONTRAST    INDICATION: 87 years old. Female. seizure.    COMPARISON: 2018.    TECHNIQUE: Noncontrast axial CT head was obtained from the skull base to vertex.    FINDINGS:  No acute intracranial hemorrhage, mass effect or midline shift.  No CT evidence of acute large vascular territory infarct.  The ventricles and cortical sulci are prominent reflecting parenchymal volume loss.  Patchy hypodensities in the periventricular white matter are nonspecific, but likely sequela of small vessel ischemic disease.    The paranasal sinuses and mastoid air cells are well aerated. The native ocular lenses are surgically absent.  No displaced calvarial fracture.    IMPRESSION:  No acute intracranial hemorrhage or mass effect.    --- End of Report ---            CAMERON CARDONA MD; Attending Radiologist  This document has been electronically signed. Oct 17 2021  9:58PM    < end of copied text >

## 2021-10-18 NOTE — PROGRESS NOTE ADULT - PROBLEM SELECTOR PLAN 1
Patient was started on baclofen outpatient  No seizure episode was witnessed in ED  Patient's vitals are stable  A CT of the head with no contrast showed no acute changes  Lactate was 4.1, and has decreased to 1.4  UA was indicative of UTI  Patient started on IVF NS  Follow up EEG conducted, results are pending  Baclofen is being held due to possibility of inducing seizures Patient was started on baclofen outpatient  No seizure episode was witnessed in ED  Patient's vitals are stable  A CT of the head with no contrast showed no acute changes  Lactate was 4.1, and has decreased to 1.4  UA was indicative of UTI  Patient started on IVF NS  Follow up EEG   Baclofen is being held due to possibility of inducing seizures  Dr. Olvera was consulted

## 2021-10-18 NOTE — PATIENT PROFILE ADULT - NSPROGENOTHERPROVIDER_GEN_A_NUR
47 y/o male with no pertinent PMHx presents after an episode of sudden onset b/l leg and arm weakness. Pt reports that he was helping a coworker replace a window when he had an acute onset of generalized b/l leg and arm weakness. He ambulated to the car with difficulty and had his coworker take him to [facility] ED. Pt reported when he tried to make a phone call his arms were so weak that he had difficulty keeping the phone at his ear. He also stated that when he arrived to the ED he had to be wheelchaired d/t the weakness in his legs. He reports that at this time he feels like he is regaining strength in his legs and arms.   Denies falling, HA, vomiting, LOC, visual changes, dysarthria, loss of sensation, incontinence
none

## 2021-10-19 DIAGNOSIS — R41.82 ALTERED MENTAL STATUS, UNSPECIFIED: ICD-10-CM

## 2021-10-19 LAB
ANION GAP SERPL CALC-SCNC: 8 MMOL/L — SIGNIFICANT CHANGE UP (ref 5–17)
BASOPHILS # BLD AUTO: 0.03 K/UL — SIGNIFICANT CHANGE UP (ref 0–0.2)
BASOPHILS NFR BLD AUTO: 0.3 % — SIGNIFICANT CHANGE UP (ref 0–2)
BUN SERPL-MCNC: 10 MG/DL — SIGNIFICANT CHANGE UP (ref 7–18)
CALCIUM SERPL-MCNC: 8.3 MG/DL — LOW (ref 8.4–10.5)
CHLORIDE SERPL-SCNC: 107 MMOL/L — SIGNIFICANT CHANGE UP (ref 96–108)
CO2 SERPL-SCNC: 24 MMOL/L — SIGNIFICANT CHANGE UP (ref 22–31)
COVID-19 SPIKE DOMAIN AB INTERP: POSITIVE
COVID-19 SPIKE DOMAIN ANTIBODY RESULT: >250 U/ML — HIGH
CREAT SERPL-MCNC: 0.39 MG/DL — LOW (ref 0.5–1.3)
CULTURE RESULTS: NO GROWTH — SIGNIFICANT CHANGE UP
EOSINOPHIL # BLD AUTO: 0.08 K/UL — SIGNIFICANT CHANGE UP (ref 0–0.5)
EOSINOPHIL NFR BLD AUTO: 0.8 % — SIGNIFICANT CHANGE UP (ref 0–6)
GLUCOSE BLDC GLUCOMTR-MCNC: 106 MG/DL — HIGH (ref 70–99)
GLUCOSE BLDC GLUCOMTR-MCNC: 86 MG/DL — SIGNIFICANT CHANGE UP (ref 70–99)
GLUCOSE SERPL-MCNC: 95 MG/DL — SIGNIFICANT CHANGE UP (ref 70–99)
HCT VFR BLD CALC: 37.3 % — SIGNIFICANT CHANGE UP (ref 34.5–45)
HGB BLD-MCNC: 12.1 G/DL — SIGNIFICANT CHANGE UP (ref 11.5–15.5)
IMM GRANULOCYTES NFR BLD AUTO: 0.2 % — SIGNIFICANT CHANGE UP (ref 0–1.5)
LYMPHOCYTES # BLD AUTO: 1.24 K/UL — SIGNIFICANT CHANGE UP (ref 1–3.3)
LYMPHOCYTES # BLD AUTO: 12.6 % — LOW (ref 13–44)
MAGNESIUM SERPL-MCNC: 2.2 MG/DL — SIGNIFICANT CHANGE UP (ref 1.6–2.6)
MCHC RBC-ENTMCNC: 29.5 PG — SIGNIFICANT CHANGE UP (ref 27–34)
MCHC RBC-ENTMCNC: 32.4 GM/DL — SIGNIFICANT CHANGE UP (ref 32–36)
MCV RBC AUTO: 91 FL — SIGNIFICANT CHANGE UP (ref 80–100)
MONOCYTES # BLD AUTO: 0.77 K/UL — SIGNIFICANT CHANGE UP (ref 0–0.9)
MONOCYTES NFR BLD AUTO: 7.8 % — SIGNIFICANT CHANGE UP (ref 2–14)
NEUTROPHILS # BLD AUTO: 7.71 K/UL — HIGH (ref 1.8–7.4)
NEUTROPHILS NFR BLD AUTO: 78.3 % — HIGH (ref 43–77)
NRBC # BLD: 0 /100 WBCS — SIGNIFICANT CHANGE UP (ref 0–0)
PHOSPHATE SERPL-MCNC: 2.2 MG/DL — LOW (ref 2.5–4.5)
PLATELET # BLD AUTO: 144 K/UL — LOW (ref 150–400)
POTASSIUM SERPL-MCNC: 3.9 MMOL/L — SIGNIFICANT CHANGE UP (ref 3.5–5.3)
POTASSIUM SERPL-SCNC: 3.9 MMOL/L — SIGNIFICANT CHANGE UP (ref 3.5–5.3)
RBC # BLD: 4.1 M/UL — SIGNIFICANT CHANGE UP (ref 3.8–5.2)
RBC # FLD: 13.1 % — SIGNIFICANT CHANGE UP (ref 10.3–14.5)
SARS-COV-2 IGG+IGM SERPL QL IA: >250 U/ML — HIGH
SARS-COV-2 IGG+IGM SERPL QL IA: POSITIVE
SODIUM SERPL-SCNC: 139 MMOL/L — SIGNIFICANT CHANGE UP (ref 135–145)
SPECIMEN SOURCE: SIGNIFICANT CHANGE UP
WBC # BLD: 9.85 K/UL — SIGNIFICANT CHANGE UP (ref 3.8–10.5)
WBC # FLD AUTO: 9.85 K/UL — SIGNIFICANT CHANGE UP (ref 3.8–10.5)

## 2021-10-19 PROCEDURE — 99232 SBSQ HOSP IP/OBS MODERATE 35: CPT | Mod: GC

## 2021-10-19 RX ORDER — BACLOFEN 100 %
1 POWDER (GRAM) MISCELLANEOUS
Qty: 0 | Refills: 0 | DISCHARGE

## 2021-10-19 RX ORDER — POTASSIUM PHOSPHATE, MONOBASIC POTASSIUM PHOSPHATE, DIBASIC 236; 224 MG/ML; MG/ML
15 INJECTION, SOLUTION INTRAVENOUS ONCE
Refills: 0 | Status: COMPLETED | OUTPATIENT
Start: 2021-10-19 | End: 2021-10-19

## 2021-10-19 RX ADMIN — CARBIDOPA AND LEVODOPA 1.5 TABLET(S): 25; 100 TABLET ORAL at 05:40

## 2021-10-19 RX ADMIN — FAMOTIDINE 20 MILLIGRAM(S): 10 INJECTION INTRAVENOUS at 11:37

## 2021-10-19 RX ADMIN — CARBIDOPA AND LEVODOPA 1.5 TABLET(S): 25; 100 TABLET ORAL at 21:34

## 2021-10-19 RX ADMIN — ATORVASTATIN CALCIUM 10 MILLIGRAM(S): 80 TABLET, FILM COATED ORAL at 21:35

## 2021-10-19 RX ADMIN — CEFTRIAXONE 100 MILLIGRAM(S): 500 INJECTION, POWDER, FOR SOLUTION INTRAMUSCULAR; INTRAVENOUS at 04:09

## 2021-10-19 RX ADMIN — ENOXAPARIN SODIUM 40 MILLIGRAM(S): 100 INJECTION SUBCUTANEOUS at 11:37

## 2021-10-19 RX ADMIN — CARBIDOPA AND LEVODOPA 1.5 TABLET(S): 25; 100 TABLET ORAL at 09:20

## 2021-10-19 RX ADMIN — CARBIDOPA AND LEVODOPA 1.5 TABLET(S): 25; 100 TABLET ORAL at 15:18

## 2021-10-19 RX ADMIN — POTASSIUM PHOSPHATE, MONOBASIC POTASSIUM PHOSPHATE, DIBASIC 62.5 MILLIMOLE(S): 236; 224 INJECTION, SOLUTION INTRAVENOUS at 09:20

## 2021-10-19 NOTE — PROGRESS NOTE ADULT - PROBLEM SELECTOR PLAN 2
Presented with sepsis, elevated WC and lactate 4.1  Lactate 1.4  UA positive for WBC, nitrites and Leukocyte Esterases  blood cultures NGTD, urine cx negative   C/W rocephin 1g daily
UA positive for nitrites and Leukocyte Esterases  follow up urine and blood cultures  Patient started on rocephin 1g daily for coverage  Maintain rocephin 1g daily until 10/21/21

## 2021-10-19 NOTE — PROGRESS NOTE ADULT - SUBJECTIVE AND OBJECTIVE BOX
PGY-1 Progress Note discussed with attending    PAGER #: [[phone]] TILL 5:00 PM  PLEASE CONTACT ON CALL TEAM:  - On Call Team (Please refer to Alireza) FROM 5:00 PM - 8:30PM  - Nightfloat Team FROM 8:30 -7:30 AM    CHIEF COMPLAINT & BRIEF HOSPITAL COURSE: 87 yr old F from home, non ambulatory, minimally verbal, AAOx1 at baseline with PMH of Parkinsons disease, dementia, HTN presents to ED after she had GTC seizures at 12pm then 7pm witnessed by  stated lasted 10 mins. She had no fever, no trauma, no vomiting, no change in meds. Pt requires full assistance with ADLs    INTERVAL HPI/OVERNIGHT EVENTS: No acute overnight events. Pt unable to provide hx      REVIEW OF SYSTEMS:  unable to obtain    Vital Signs Last 24 Hrs  T(C): 37 (18 Oct 2021 05:21), Max: 37.6 (18 Oct 2021 02:01)  T(F): 98.6 (18 Oct 2021 05:21), Max: 99.6 (18 Oct 2021 02:01)  HR: 88 (18 Oct 2021 05:21) (70 - 88)  BP: 151/83 (18 Oct 2021 05:21) (128/80 - 151/83)  BP(mean): --  RR: 18 (18 Oct 2021 05:21) (16 - 18)  SpO2: 96% (18 Oct 2021 05:21) (93% - 96%)    PHYSICAL EXAMINATION:  GENERAL: NAD,   HEAD:  Atraumatic, Normocephalic  CHEST/LUNG: Clear to auscultation. Clear to percussion bilaterally; No rales, rhonchi, wheezing, or rubs  HEART: Regular rate and rhythm; No murmurs, rubs, or gallops  ABDOMEN: Soft, Nontender, Nondistended; Bowel sounds present  NERVOUS SYSTEM:  Alert & Oriented X 0,    EXTREMITIES:  2+ Peripheral Pulses, No clubbing, cyanosis, or edema  SKIN: warm dry                          12.5   11.86 )-----------( 151      ( 18 Oct 2021 06:10 )             37.8     10-18    140  |  110<H>  |  12  ----------------------------<  114<H>  4.1   |  27  |  0.60    Ca    8.0<L>      18 Oct 2021 06:10  Phos  2.6     10-18  Mg     1.9     10-18    TPro  6.8  /  Alb  2.7<L>  /  TBili  0.5  /  DBili  x   /  AST  11  /  ALT  18  /  AlkPhos  97  10-18    LIVER FUNCTIONS - ( 18 Oct 2021 06:10 )  Alb: 2.7 g/dL / Pro: 6.8 g/dL / ALK PHOS: 97 U/L / ALT: 18 U/L DA / AST: 11 U/L / GGT: x           CARDIAC MARKERS ( 17 Oct 2021 21:14 )  <0.015 ng/mL / x     / x     / x     / x              CAPILLARY BLOOD GLUCOSE      RADIOLOGY & ADDITIONAL TESTS:                  
OMS-III Progress Note discussed with resident and attending    CHIEF COMPLAINT & BRIEF HOSPITAL COURSE:    HPI:  Patient is an 87 year old Female with a past medical history of Parkinson's Disease, dementia, hypertension and hyperlipidemia presenting to the ED after two separate GTC seizures witnessed by her  at 12pm and 7pm. Both seizures were reported to have lasted approximately ten minutes. Patient does not ambulate, and presents as AAOx1 at baseline. Patient does not present with fever, trauma, or vomiting, and has not had any recent medication changes. Patient requires full assistance with activities of daily living (17 Oct 2021 @22:57)    INTERVAL HPI/OVERNIGHT EVENTS: No significant overnight events had occurred.    REVIEW OF SYSTEMS:  CONSTITUTIONAL: No fever, weight loss, or fatigue  RESPIRATORY: No cough, wheezing, chills or hemoptysis; No shortness of breath  CARDIOVASCULAR: No chest pain, palpitations, dizziness, or leg swelling  GASTROINTESTINAL: No abdominal pain. No nausea, vomiting, or hematemesis; No diarrhea or constipation. No melena or hematochezia.  GENITOURINARY: No dysuria or hematuria, urinary frequency  NEUROLOGICAL: No headaches, memory loss, loss of strength, numbness, or tremors  SKIN: No itching, burning, rashes, or lesions     MEDICATIONS  (STANDING):  atorvastatin 10 milliGRAM(s) Oral at bedtime  carbidopa/levodopa  25/100 1.5 Tablet(s) Oral <User Schedule>  cefTRIAXone   IVPB 1000 milliGRAM(s) IV Intermittent every 24 hours  enoxaparin Injectable 40 milliGRAM(s) SubCutaneous daily  famotidine: Tablet 20 milliGRAM(s) Oral daily      MEDICATIONS  (PRN):      Vital Signs Last 24 Hrs  T(C): 37 (18 Oct 2021 05:21), Max: 37.6 (18 Oct 2021 02:01)  T(F): 98.6 (18 Oct 2021 05:21), Max: 99.6 (18 Oct 2021 02:01)  HR: 88 (18 Oct 2021 05:21) (70 - 88)  BP: 151/83 (18 Oct 2021 05:21) (128/80 - 151/83)  BP(mean): --  RR: 18 (18 Oct 2021 05:21) (16 - 18)  SpO2: 96% (18 Oct 2021 05:21) (93% - 96%)    PHYSICAL EXAMINATION:  GENERAL: NAD, fatigued  HEAD:  Atraumatic, Normocephalic  NECK: Supple, No JVD, Normal thyroid  CHEST/LUNG: Clear to auscultation. Clear to percussion bilaterally; No rales, rhonchi, wheezing, or rubs  HEART: Regular rate and rhythm; No murmurs, rubs, or gallops  ABDOMEN: Soft, Nontender, Nondistended; Bowel sounds present, no pain or masses on palpation  NERVOUS SYSTEM:  Alert & Oriented X0  EXTREMITIES:  2+ Peripheral Pulses, No clubbing, cyanosis, or edema  SKIN: warm dry                          12.5   11.86 )-----------( 151      ( 18 Oct 2021 06:10 )             37.8     10-18    140  |  110<H>  |  12  ----------------------------<  114<H>  4.1   |  27  |  0.60    Ca    8.0<L>      18 Oct 2021 06:10  Phos  2.6     10-18  Mg     1.9     10-18    TPro  6.8  /  Alb  2.7<L>  /  TBili  0.5  /  DBili  x   /  AST  11  /  ALT  18  /  AlkPhos  97  10-18    LIVER FUNCTIONS - ( 18 Oct 2021 06:10 )  Alb: 2.7 g/dL / Pro: 6.8 g/dL / ALK PHOS: 97 U/L / ALT: 18 U/L DA / AST: 11 U/L / GGT: x           CARDIAC MARKERS ( 17 Oct 2021 21:14 )  <0.015 ng/mL / x     / x     / x     / x              I&O's Summary        CAPILLARY BLOOD GLUCOSE    POCT Blood Glucose.: 157 mg/dL (17 Oct 2021 21:19)      RADIOLOGY & ADDITIONAL TESTS:    Results of a CT scan of the head with no contrast on 10/17/21, showed no evidence of acute intracranial hemorrhage or mass effect                      
PGY-1 Progress Note discussed with attending    PAGER #: [[phone]] TILL 5:00 PM  PLEASE CONTACT ON CALL TEAM:  - On Call Team (Please refer to Alireza) FROM 5:00 PM - 8:30PM  - Nightfloat Team FROM 8:30 -7:30 AM    HPI:  Patient is an 87 year old Female with a past medical history of Parkinson's Disease, dementia, hypertension and hyperlipidemia presenting to the ED after two separate GTC seizures witnessed by her  at 12pm and 7pm. Both seizures were reported to have lasted approximately ten minutes. Patient does not ambulate, and presents as AAOx1 at baseline. Patient does not present with fever, trauma, or vomiting, and has not had any recent medication changes. Patient requires full assistance with activities of daily living    INTERVAL HPI/OVERNIGHT EVENTS: No significant overnight events had occurred.        REVIEW OF SYSTEMS:  unable to obtain    Vital Signs Last 24 Hrs  T(C): 36.8 (19 Oct 2021 14:14), Max: 36.8 (19 Oct 2021 14:14)  T(F): 98.2 (19 Oct 2021 14:14), Max: 98.2 (19 Oct 2021 14:14)  HR: 83 (19 Oct 2021 14:14) (78 - 83)  BP: 126/74 (19 Oct 2021 14:14) (114/70 - 137/72)  BP(mean): --  RR: 17 (19 Oct 2021 14:14) (17 - 18)  SpO2: 97% (19 Oct 2021 14:14) (97% - 100%)    PHYSICAL EXAMINATION:  GENERAL: NAD, more awake  HEAD:  Atraumatic, Normocephalic  NECK: Supple, No JVD, Normal thyroid  CHEST/LUNG: Clear to auscultation. Clear to percussion bilaterally; No rales, rhonchi, wheezing, or rubs  HEART: Regular rate and rhythm; No murmurs, rubs, or gallops  ABDOMEN: Soft, Nontender, Nondistended; Bowel sounds present, no pain or masses on palpation  NERVOUS SYSTEM:  Alert & Oriented X0  EXTREMITIES:  2+ Peripheral Pulses, No clubbing, cyanosis, or edema  SKIN: warm dry                            12.1   9.85  )-----------( 144      ( 19 Oct 2021 06:36 )             37.3     10-19    139  |  107  |  10  ----------------------------<  95  3.9   |  24  |  0.39<L>    Ca    8.3<L>      19 Oct 2021 06:36  Phos  2.2     10-19  Mg     2.2     10-19    TPro  6.8  /  Alb  2.7<L>  /  TBili  0.5  /  DBili  x   /  AST  11  /  ALT  18  /  AlkPhos  97  10-18    LIVER FUNCTIONS - ( 18 Oct 2021 06:10 )  Alb: 2.7 g/dL / Pro: 6.8 g/dL / ALK PHOS: 97 U/L / ALT: 18 U/L DA / AST: 11 U/L / GGT: x           CARDIAC MARKERS ( 17 Oct 2021 21:14 )  <0.015 ng/mL / x     / x     / x     / x              CAPILLARY BLOOD GLUCOSE      RADIOLOGY & ADDITIONAL TESTS:

## 2021-10-19 NOTE — PROGRESS NOTE ADULT - ASSESSMENT
Patient is an 87 year old Female with a past medical history of Parkinson's Disease, dementia, hypertension and hyperlipidemia presenting to the ED after two separate GTC seizures witnessed by her  at 12pm and 7pm. Pt was admitted initially for seizures. Neurology was consulted and pt has a hx of  shaking her limbs when in distress. Likely 2/2 sepsis UTI and unlikely a seizure episode.

## 2021-10-19 NOTE — PROGRESS NOTE ADULT - PROBLEM SELECTOR PLAN 1
- pt presented after what appeared to be possible seizure episode at home  - pt has a hx of shaking all her limbs when is distress, and this episode just lasted longer  - unlikely a seizure, "shaking" likely 2/2 urosepsis  - at baseline shes very minimally verbal, A/O X 0  - currently near baseline today   - pt recently started baclofen outpatient which can contribute to increase in drowsiness, will hold    - Dr. Olvera following

## 2021-10-19 NOTE — PROGRESS NOTE ADULT - ATTENDING COMMENTS
87 year old F with Parkinson's dz, dementia, HTN, HLD brought in for witnessed sudden onset convulsive seizure episode. CT head unremarkable.    Patient is drowsy today, difficulty waking patient up. Lactic acidosis resovled after IVF. Neurology evaluated patient today, think less likely seizure. Patient might be having rigors/shaking from underlying infx. Will not need EEG. On IV abx with ceftriaxone. F/u urine cx. PT eval pending.
Elderly lady with advanced parkinsons disease and dementia presented with altered mental status and shaking after recently starting baclofen. UA positive on arrival with elevated lactate and tachycardia. Work-up and consult with neurology, believe that shaking not related to seizure and AMS improving. Urine Culture negative, but given elevated lactate and improving mental status on ceftriaxone, will complete 7 day course. Continue to hold baclofen. Start mucinex for difficulty with phlegm. Anticipate discharge tomorrow home if mental status remains at baseline.

## 2021-10-19 NOTE — PROGRESS NOTE ADULT - PROBLEM SELECTOR PLAN 3
Patient has history of Parkinson's Disease and Dementia  Continue on Carvidopa and Levodopa
Patient has history of Parkinson's Disease and Dementia  Continue on Carvidopa and Levodopa

## 2021-10-20 ENCOUNTER — TRANSCRIPTION ENCOUNTER (OUTPATIENT)
Age: 86
End: 2021-10-20

## 2021-10-20 VITALS
DIASTOLIC BLOOD PRESSURE: 90 MMHG | RESPIRATION RATE: 18 BRPM | HEART RATE: 87 BPM | OXYGEN SATURATION: 98 % | TEMPERATURE: 98 F | SYSTOLIC BLOOD PRESSURE: 154 MMHG

## 2021-10-20 LAB
ANION GAP SERPL CALC-SCNC: 12 MMOL/L — SIGNIFICANT CHANGE UP (ref 5–17)
BUN SERPL-MCNC: 9 MG/DL — SIGNIFICANT CHANGE UP (ref 7–18)
CALCIUM SERPL-MCNC: 8.4 MG/DL — SIGNIFICANT CHANGE UP (ref 8.4–10.5)
CHLORIDE SERPL-SCNC: 104 MMOL/L — SIGNIFICANT CHANGE UP (ref 96–108)
CO2 SERPL-SCNC: 23 MMOL/L — SIGNIFICANT CHANGE UP (ref 22–31)
CREAT SERPL-MCNC: 0.49 MG/DL — LOW (ref 0.5–1.3)
GLUCOSE BLDC GLUCOMTR-MCNC: 76 MG/DL — SIGNIFICANT CHANGE UP (ref 70–99)
GLUCOSE SERPL-MCNC: 78 MG/DL — SIGNIFICANT CHANGE UP (ref 70–99)
HCT VFR BLD CALC: 36.2 % — SIGNIFICANT CHANGE UP (ref 34.5–45)
HGB BLD-MCNC: 12.5 G/DL — SIGNIFICANT CHANGE UP (ref 11.5–15.5)
MAGNESIUM SERPL-MCNC: 2 MG/DL — SIGNIFICANT CHANGE UP (ref 1.6–2.6)
MCHC RBC-ENTMCNC: 30.1 PG — SIGNIFICANT CHANGE UP (ref 27–34)
MCHC RBC-ENTMCNC: 34.5 GM/DL — SIGNIFICANT CHANGE UP (ref 32–36)
MCV RBC AUTO: 87.2 FL — SIGNIFICANT CHANGE UP (ref 80–100)
NRBC # BLD: 0 /100 WBCS — SIGNIFICANT CHANGE UP (ref 0–0)
PHOSPHATE SERPL-MCNC: 2 MG/DL — LOW (ref 2.5–4.5)
PLATELET # BLD AUTO: 143 K/UL — LOW (ref 150–400)
POTASSIUM SERPL-MCNC: 3.1 MMOL/L — LOW (ref 3.5–5.3)
POTASSIUM SERPL-SCNC: 3.1 MMOL/L — LOW (ref 3.5–5.3)
RBC # BLD: 4.15 M/UL — SIGNIFICANT CHANGE UP (ref 3.8–5.2)
RBC # FLD: 12.8 % — SIGNIFICANT CHANGE UP (ref 10.3–14.5)
SODIUM SERPL-SCNC: 139 MMOL/L — SIGNIFICANT CHANGE UP (ref 135–145)
WBC # BLD: 9.2 K/UL — SIGNIFICANT CHANGE UP (ref 3.8–10.5)
WBC # FLD AUTO: 9.2 K/UL — SIGNIFICANT CHANGE UP (ref 3.8–10.5)

## 2021-10-20 PROCEDURE — 87086 URINE CULTURE/COLONY COUNT: CPT

## 2021-10-20 PROCEDURE — 81001 URINALYSIS AUTO W/SCOPE: CPT

## 2021-10-20 PROCEDURE — 70450 CT HEAD/BRAIN W/O DYE: CPT | Mod: MA

## 2021-10-20 PROCEDURE — 80061 LIPID PANEL: CPT

## 2021-10-20 PROCEDURE — 99285 EMERGENCY DEPT VISIT HI MDM: CPT

## 2021-10-20 PROCEDURE — 92610 EVALUATE SWALLOWING FUNCTION: CPT

## 2021-10-20 PROCEDURE — 93005 ELECTROCARDIOGRAM TRACING: CPT

## 2021-10-20 PROCEDURE — 83036 HEMOGLOBIN GLYCOSYLATED A1C: CPT

## 2021-10-20 PROCEDURE — 87635 SARS-COV-2 COVID-19 AMP PRB: CPT

## 2021-10-20 PROCEDURE — 84443 ASSAY THYROID STIM HORMONE: CPT

## 2021-10-20 PROCEDURE — 80053 COMPREHEN METABOLIC PANEL: CPT

## 2021-10-20 PROCEDURE — 87040 BLOOD CULTURE FOR BACTERIA: CPT

## 2021-10-20 PROCEDURE — 99239 HOSP IP/OBS DSCHRG MGMT >30: CPT | Mod: GC

## 2021-10-20 PROCEDURE — 71045 X-RAY EXAM CHEST 1 VIEW: CPT

## 2021-10-20 PROCEDURE — 83605 ASSAY OF LACTIC ACID: CPT

## 2021-10-20 PROCEDURE — 86769 SARS-COV-2 COVID-19 ANTIBODY: CPT

## 2021-10-20 PROCEDURE — 84484 ASSAY OF TROPONIN QUANT: CPT

## 2021-10-20 PROCEDURE — 36415 COLL VENOUS BLD VENIPUNCTURE: CPT

## 2021-10-20 PROCEDURE — 90686 IIV4 VACC NO PRSV 0.5 ML IM: CPT

## 2021-10-20 PROCEDURE — 83735 ASSAY OF MAGNESIUM: CPT

## 2021-10-20 PROCEDURE — 80048 BASIC METABOLIC PNL TOTAL CA: CPT

## 2021-10-20 PROCEDURE — 82962 GLUCOSE BLOOD TEST: CPT

## 2021-10-20 PROCEDURE — 84100 ASSAY OF PHOSPHORUS: CPT

## 2021-10-20 PROCEDURE — 85027 COMPLETE CBC AUTOMATED: CPT

## 2021-10-20 PROCEDURE — 85025 COMPLETE CBC W/AUTO DIFF WBC: CPT

## 2021-10-20 RX ORDER — SODIUM CHLORIDE 9 MG/ML
1000 INJECTION INTRAMUSCULAR; INTRAVENOUS; SUBCUTANEOUS
Refills: 0 | Status: DISCONTINUED | OUTPATIENT
Start: 2021-10-20 | End: 2021-10-20

## 2021-10-20 RX ORDER — CEFPODOXIME PROXETIL 100 MG
1 TABLET ORAL
Qty: 6 | Refills: 0
Start: 2021-10-20 | End: 2021-10-22

## 2021-10-20 RX ORDER — CEFTRIAXONE 500 MG/1
1000 INJECTION, POWDER, FOR SOLUTION INTRAMUSCULAR; INTRAVENOUS EVERY 24 HOURS
Refills: 0 | Status: DISCONTINUED | OUTPATIENT
Start: 2021-10-20 | End: 2021-10-20

## 2021-10-20 RX ORDER — INFLUENZA VIRUS VACCINE 15; 15; 15; 15 UG/.5ML; UG/.5ML; UG/.5ML; UG/.5ML
0.5 SUSPENSION INTRAMUSCULAR ONCE
Refills: 0 | Status: COMPLETED | OUTPATIENT
Start: 2021-10-20 | End: 2021-10-20

## 2021-10-20 RX ADMIN — CARBIDOPA AND LEVODOPA 1.5 TABLET(S): 25; 100 TABLET ORAL at 06:37

## 2021-10-20 RX ADMIN — CARBIDOPA AND LEVODOPA 1.5 TABLET(S): 25; 100 TABLET ORAL at 09:53

## 2021-10-20 RX ADMIN — ENOXAPARIN SODIUM 40 MILLIGRAM(S): 100 INJECTION SUBCUTANEOUS at 11:54

## 2021-10-20 RX ADMIN — CARBIDOPA AND LEVODOPA 1.5 TABLET(S): 25; 100 TABLET ORAL at 14:30

## 2021-10-20 RX ADMIN — CEFTRIAXONE 100 MILLIGRAM(S): 500 INJECTION, POWDER, FOR SOLUTION INTRAMUSCULAR; INTRAVENOUS at 09:52

## 2021-10-20 RX ADMIN — INFLUENZA VIRUS VACCINE 0.5 MILLILITER(S): 15; 15; 15; 15 SUSPENSION INTRAMUSCULAR at 16:29

## 2021-10-20 RX ADMIN — CEFTRIAXONE 100 MILLIGRAM(S): 500 INJECTION, POWDER, FOR SOLUTION INTRAMUSCULAR; INTRAVENOUS at 01:22

## 2021-10-20 NOTE — DISCHARGE NOTE PROVIDER - TIME BILLING
- Counseling patient and family on parkinsons dementia, dysphagia, UTI  - Coordination of care with  and case management  - Preparation of discharge paperwork

## 2021-10-20 NOTE — DISCHARGE NOTE NURSING/CASE MANAGEMENT/SOCIAL WORK - PATIENT PORTAL LINK FT
You can access the FollowMyHealth Patient Portal offered by [facility] by registering at the following website: [email or web address]. By joining [employer]’s FollowMyHealth portal, you will also be able to view your health information using other applications (apps) compatible with our system.

## 2021-10-20 NOTE — DISCHARGE NOTE PROVIDER - NSDCCPCAREPLAN_GEN_ALL_CORE_FT
PRINCIPAL DISCHARGE DIAGNOSIS  Diagnosis: UTI (urinary tract infection)  Assessment and Plan of Treatment: You were found to have a urinary tract infection on your admission.    You were treated with **** days of iv antibiotics in the hospital with the goal of clearing this infection  You will be discharged on *** Please continue to take your medications as prescribed. Urinary tract infections can cause weakness, confusion, or spread to other organ systems in the body. If you experience continued symptoms of infection (fever, chills, weakness, or burning with urination), please follow up with your primary care provider.        SECONDARY DISCHARGE DIAGNOSES  Diagnosis: Parkinsons disease  Assessment and Plan of Treatment: You have a history of Parkinsons Disease. Please continue to take your medications as prescribed. You were recently started with Baclofen outpatient for the spasms in your legs. This medication can also make you more lethargic. Follow up with your neurologist regarding this medications.    Diagnosis: Altered mental status  Assessment and Plan of Treatment: You were admitted intially because your mental status was not at baseline. Intially you were thought to have seizures, but the "shaking" movements you were experiecing was from a urniary tract infection. Urinary tract infections can cause weakness, confusion, or spread to other organ systems in the body. If you experience continued symptoms of infection (fever, chills, weakness, or burning with urination), please follow up with your primary care provider.    Diagnosis: UTI (urinary tract infection)  Assessment and Plan of Treatment:      PRINCIPAL DISCHARGE DIAGNOSIS  Diagnosis: UTI (urinary tract infection)  Assessment and Plan of Treatment: You were found to have a urinary tract infection on your admission.    You were treated with **** days of iv antibiotics in the hospital with the goal of clearing this infection  You will be discharged on *** Please continue to take your medications as prescribed. Urinary tract infections can cause weakness, confusion, or spread to other organ systems in the body. If you experience continued symptoms of infection (fever, chills, weakness, or burning with urination), please follow up with your primary care provider.        SECONDARY DISCHARGE DIAGNOSES  Diagnosis: Parkinsons disease  Assessment and Plan of Treatment: You have a history of Parkinsons Disease. Please continue to take your medications as prescribed. You were recently started with Baclofen outpatient for the spasms in your legs. This medication can also make you more lethargic. Follow up with your neurologist regarding this medications.    Diagnosis: Altered mental status  Assessment and Plan of Treatment: You were admitted intially because your mental status was not at baseline. Intially you were thought to have seizures, but the "shaking" movements you were experiecing was from a urniary tract infection. Urinary tract infections can cause weakness, confusion, or spread to other organ systems in the body. If you experience continued symptoms of infection (fever, chills, weakness, or burning with urination), please follow up with your primary care provider.     PRINCIPAL DISCHARGE DIAGNOSIS  Diagnosis: UTI (urinary tract infection)  Assessment and Plan of Treatment: You were found to have a urinary tract infection on your admission.    You were treated with 4 days of iv antibiotics in the hospital with the goal of clearing this infection  You will be discharged on cefpodoxime 200mg twice daily for 3 days. Please continue to take your medications as prescribed. Urinary tract infections can cause weakness, confusion, or spread to other organ systems in the body. If you experience continued symptoms of infection (fever, chills, weakness, or burning with urination), please follow up with your primary care provider.        SECONDARY DISCHARGE DIAGNOSES  Diagnosis: Parkinsons disease  Assessment and Plan of Treatment: You have a history of Parkinsons Disease. Please continue to take your medications as prescribed. You were recently started with Baclofen outpatient for the spasms in your legs. This medication can also make you more lethargic. Follow up with your neurologist regarding this medications.    Diagnosis: Altered mental status  Assessment and Plan of Treatment: You were admitted intially because your mental status was not at baseline. Intially you were thought to have seizures, but the "shaking" movements you were experiecing was from a urniary tract infection. Urinary tract infections can cause weakness, confusion, or spread to other organ systems in the body. If you experience continued symptoms of infection (fever, chills, weakness, or burning with urination), please follow up with your primary care provider.     PRINCIPAL DISCHARGE DIAGNOSIS  Diagnosis: UTI (urinary tract infection)  Assessment and Plan of Treatment: You were found to have a urinary tract infection on your admission.    You were treated with 4 days of iv antibiotics in the hospital with the goal of clearing this infection  You will be discharged on amoxicillin 875mg twice daily for 3 days. Please continue to take your medications as prescribed. Urinary tract infections can cause weakness, confusion, or spread to other organ systems in the body. If you experience continued symptoms of infection (fever, chills, weakness, or burning with urination), please follow up with your primary care provider.        SECONDARY DISCHARGE DIAGNOSES  Diagnosis: Parkinsons disease  Assessment and Plan of Treatment: You have a history of Parkinsons Disease. Please continue to take your medications as prescribed. You were recently started with Baclofen outpatient for the spasms in your legs. This medication can also make you more lethargic. Follow up with your neurologist regarding this medications.    Diagnosis: Altered mental status  Assessment and Plan of Treatment: You were admitted intially because your mental status was not at baseline. Intially you were thought to have seizures, but the "shaking" movements you were experiecing was from a urniary tract infection. Urinary tract infections can cause weakness, confusion, or spread to other organ systems in the body. If you experience continued symptoms of infection (fever, chills, weakness, or burning with urination), please follow up with your primary care provider.

## 2021-10-20 NOTE — DISCHARGE NOTE PROVIDER - HOSPITAL COURSE
Patient is an 87 year old Female with a past medical history of Parkinson's Disease, dementia, hypertension and hyperlipidemia presenting to the ED for AMS    AMS: initially was thought to have two episodes of seizures at home. CTH was negative. Further discussions with family, patient has hx of "shaking her limbs" when under stress.  AMS likley 2/2 UTI. Repeat lactate WNL. Will discharge with Ceftin for 4 days, for a total of 7 days.    UTI: UA positive for WBC, nitrites and Leukocyte Esterases. blood cultures NGTD, urine cx negative. Patient unable to express sxs, will treat for 7 days.    Parkinsons disease: Patient has history of Parkinson's Disease and Dementia, Continue on Carvidopa and Levodopa.     Patient is an 87 year old Female with a past medical history of Parkinson's Disease, dementia, hypertension and hyperlipidemia presenting to the ED for AMS    AMS: initially was thought to have two episodes of seizures at home. CTH was negative. Further discussions with family, patient has hx of "shaking her limbs" when under stress.  AMS likley 2/2 UTI. Repeat lactate WNL. Will discharge with Amoxicillin for 3 days.     UTI: UA positive for WBC, nitrites and Leukocyte Esterases. blood cultures NGTD, urine cx negative. Patient unable to express sxs, will treat for 7 days total.     Parkinsons disease: Patient has history of Parkinson's Disease and Dementia, Continue on Carvidopa and Levodopa.

## 2021-10-20 NOTE — SWALLOW BEDSIDE ASSESSMENT ADULT - ORAL PREPARATORY PHASE
Refuses to accept bolus into oral cavity Refuses to accept bolus into oral cavity/Reduced oral grading/Anterior loss of bolus/Bolus falls into anterior sulcus/Bolus falls into left lateral sulci/Bolus falls into right lateral sulci

## 2021-10-20 NOTE — SWALLOW BEDSIDE ASSESSMENT ADULT - SLP PERTINENT HISTORY OF CURRENT PROBLEM
87F from home, non ambulatory, minimally verbal, AAOx1 at baseline. PMHx of Parkinsons disease, dementia, HTN. Pt reportedly presented to ED after she had GTC seizures at 12pm then 7pm witnessed by  stated lasted 10 mins. She reportedly had no fever, no trauma, no vomiting, no change in meds. Pt reportedly requires full assistance with ADLs.

## 2021-10-20 NOTE — SWALLOW BEDSIDE ASSESSMENT ADULT - SWALLOW EVAL: DIAGNOSIS
Pt p/w s&s of oropharyngeal dysphagia w/ neurogenic c/b poor oral aperature, significant anterior spillage of bolus, reduced oral grading, significant prolonged bolus holding, absent bolus formation, decreased hyolaryngeal excursion & delayed swallow trigger. Overt s&s of penetration/ aspiration observed on 1/5 trials of puree; however, daughter reported cough was 2/2 pt due for next dose of PD medication. SLP provided education to daughter and  on the risks of aspiration 2/2 diagnosis of advanced PD & dementia.

## 2021-10-20 NOTE — SWALLOW BEDSIDE ASSESSMENT ADULT - SWALLOW EVAL: RECOMMENDED FEEDING/EATING TECHNIQUES
check mouth frequently for oral residue/pocketing/crush medication (when feasible)/hard swallow w/ each bite or sip/maintain upright posture during/after eating for 30 mins/oral hygiene/position upright (90 degrees)/small sips/bites

## 2021-10-20 NOTE — SWALLOW BEDSIDE ASSESSMENT ADULT - COMMENTS
Pt elevated to 90° by SLP. AA+O x0, minimally verbal. Daughter and  at bedside. (+) somnolent, inconsistently responsive to verbal cues, illogical breath groups, & orally defensive.

## 2021-10-20 NOTE — DISCHARGE NOTE PROVIDER - NSDCPNSUBOBJ_GEN_ALL_CORE
S: Patient awake and alert, having difficulty with swallowing.    O:  Vital Signs Last 24 Hrs  T(C): 36.7 (20 Oct 2021 16:30), Max: 36.9 (19 Oct 2021 19:44)  T(F): 98 (20 Oct 2021 16:30), Max: 98.5 (19 Oct 2021 19:44)  HR: 87 (20 Oct 2021 16:30) (85 - 92)  BP: 154/90 (20 Oct 2021 16:30) (133/78 - 174/96)  BP(mean): --  RR: 18 (20 Oct 2021 16:30) (15 - 18)  SpO2: 98% (20 Oct 2021 16:30) (96% - 99%)    GENERAL: NAD, well-developed  HEAD:  Atraumatic, Normocephalic  EYES: EOMI, PERRLA, conjunctiva and sclera clear  NECK: Supple, No JVD  CHEST/LUNG: Clear to auscultation bilaterally; No wheeze  HEART: Regular rate and rhythm; No murmurs, rubs, or gallops  ABDOMEN: Soft, Nontender, Nondistended; Bowel sounds present  EXTREMITIES:  2+ Peripheral Pulses, No clubbing, cyanosis, or edema  PSYCH: AAOx0  NEUROLOGY: non-focal  SKIN: No rashes or lesions    atorvastatin 10 milliGRAM(s) Oral at bedtime  carbidopa/levodopa  25/100 1.5 Tablet(s) Oral <User Schedule>  cefTRIAXone   IVPB 1000 milliGRAM(s) IV Intermittent every 24 hours  enoxaparin Injectable 40 milliGRAM(s) SubCutaneous daily  famotidine    Tablet 20 milliGRAM(s) Oral daily  guaiFENesin  milliGRAM(s) Oral every 12 hours PRN  sodium chloride 0.9%. 1000 milliLiter(s) IV Continuous <Continuous>                            12.5   9.20  )-----------( 143      ( 20 Oct 2021 11:00 )             36.2       10-20    139  |  104  |  9   ----------------------------<  78  3.1<L>   |  23  |  0.49<L>    Ca    8.4      20 Oct 2021 11:00  Phos  2.0     10-20  Mg     2.0     10-20

## 2021-10-20 NOTE — DISCHARGE NOTE NURSING/CASE MANAGEMENT/SOCIAL WORK - NSDCVIVACCINE_GEN_ALL_CORE_FT
No Vaccines Administered. influenza, injectable, quadrivalent, preservative free; 20-Oct-2021 16:29; Roosevelt Friend (RN); [employer]; [id number] (Exp. Date: 30-Jun-2022); IntraMuscular; Deltoid Left.; 0.5 milliLiter(s); VIS (VIS Published: 06-Aug-2021, VIS Presented: 20-Oct-2021);

## 2021-10-23 LAB
CULTURE RESULTS: SIGNIFICANT CHANGE UP
CULTURE RESULTS: SIGNIFICANT CHANGE UP
SPECIMEN SOURCE: SIGNIFICANT CHANGE UP
SPECIMEN SOURCE: SIGNIFICANT CHANGE UP

## 2022-06-16 NOTE — DISCHARGE NOTE ADULT - CARE PLAN
Detail Level: Detailed Add 43652 Cpt? (Important Note: In 2017 The Use Of 92476 Is Being Tracked By Cms To Determine Future Global Period Reimbursement For Global Periods): yes Wound Evaluated By: Yaniv Cramer Principal Discharge DX:	Failure to thrive in adult  Goal:	management  Assessment and plan of treatment:	- You presented with decreased oral intake, however unlikely infectious etiology at this point given afebrile, no leukocytosis,   - Chest X-ray showed no consolidation, Urine analysis was negative more likely decreased intake/activity secondary to requiring Parkinson   neuro consulted Dr. Winston  - You are medically stable to be discharged from the hospital.  - You need to follow up with your Primary Care Physician in 1 week.  - You need to continue your medications regularly as prescribed.  Secondary Diagnosis:	Parkinson's disease  Assessment and plan of treatment:	- You have a history of Parkinson's disease  - You are medically stable to be discharged from the hospital.  - You need to follow up with your Primary Care Physician in 1 week.  - You need to continue your medications regularly as prescribed.  Secondary Diagnosis:	Dysphagia  Assessment and plan of treatment:	- You have a history of dysphagia  - You need to continue your medications protonix regularly as prescribed.  Secondary Diagnosis:	Dementia  Assessment and plan of treatment:	AOx1 at baseline, minimally verbal 1-2 words.  Secondary Diagnosis:	Chronic constipation  Assessment and plan of treatment:	last BM 4 days ago  you were given tap water enema x1 along with senna, colace, miralax ATC.

## 2023-01-01 ENCOUNTER — TRANSCRIPTION ENCOUNTER (OUTPATIENT)
Age: 88
End: 2023-01-01

## 2023-01-01 ENCOUNTER — INPATIENT (INPATIENT)
Facility: HOSPITAL | Age: 88
LOS: 2 days | Discharge: HOME | DRG: 871 | End: 2023-03-15
Attending: HOSPITALIST | Admitting: HOSPITALIST
Payer: COMMERCIAL

## 2023-01-01 ENCOUNTER — INPATIENT (INPATIENT)
Facility: HOSPITAL | Age: 88
LOS: 11 days | DRG: 951 | End: 2023-03-27
Attending: FAMILY MEDICINE | Admitting: FAMILY MEDICINE
Payer: OTHER MISCELLANEOUS

## 2023-01-01 VITALS
HEART RATE: 89 BPM | SYSTOLIC BLOOD PRESSURE: 102 MMHG | DIASTOLIC BLOOD PRESSURE: 66 MMHG | TEMPERATURE: 100 F | OXYGEN SATURATION: 95 % | RESPIRATION RATE: 18 BRPM

## 2023-01-01 VITALS
SYSTOLIC BLOOD PRESSURE: 124 MMHG | HEART RATE: 102 BPM | DIASTOLIC BLOOD PRESSURE: 73 MMHG | RESPIRATION RATE: 20 BRPM | OXYGEN SATURATION: 98 % | TEMPERATURE: 98 F

## 2023-01-01 VITALS
TEMPERATURE: 100 F | DIASTOLIC BLOOD PRESSURE: 50 MMHG | HEART RATE: 114 BPM | OXYGEN SATURATION: 97 % | RESPIRATION RATE: 18 BRPM | SYSTOLIC BLOOD PRESSURE: 94 MMHG

## 2023-01-01 VITALS
OXYGEN SATURATION: 95 % | TEMPERATURE: 97 F | HEART RATE: 109 BPM | HEIGHT: 58 IN | RESPIRATION RATE: 17 BRPM | WEIGHT: 87.96 LBS | DIASTOLIC BLOOD PRESSURE: 74 MMHG | SYSTOLIC BLOOD PRESSURE: 143 MMHG

## 2023-01-01 DIAGNOSIS — Z29.9 ENCOUNTER FOR PROPHYLACTIC MEASURES, UNSPECIFIED: ICD-10-CM

## 2023-01-01 DIAGNOSIS — R53.2 FUNCTIONAL QUADRIPLEGIA: ICD-10-CM

## 2023-01-01 DIAGNOSIS — K11.7 DISTURBANCES OF SALIVARY SECRETION: ICD-10-CM

## 2023-01-01 DIAGNOSIS — R45.1 RESTLESSNESS AND AGITATION: ICD-10-CM

## 2023-01-01 DIAGNOSIS — R53.81 OTHER MALAISE: ICD-10-CM

## 2023-01-01 DIAGNOSIS — E43 UNSPECIFIED SEVERE PROTEIN-CALORIE MALNUTRITION: ICD-10-CM

## 2023-01-01 DIAGNOSIS — G20 PARKINSON'S DISEASE: ICD-10-CM

## 2023-01-01 DIAGNOSIS — R06.03 ACUTE RESPIRATORY DISTRESS: ICD-10-CM

## 2023-01-01 DIAGNOSIS — E11.9 TYPE 2 DIABETES MELLITUS WITHOUT COMPLICATIONS: ICD-10-CM

## 2023-01-01 DIAGNOSIS — K59.09 OTHER CONSTIPATION: ICD-10-CM

## 2023-01-01 DIAGNOSIS — Z98.890 OTHER SPECIFIED POSTPROCEDURAL STATES: Chronic | ICD-10-CM

## 2023-01-01 DIAGNOSIS — A41.9 SEPSIS, UNSPECIFIED ORGANISM: ICD-10-CM

## 2023-01-01 DIAGNOSIS — Z51.5 ENCOUNTER FOR PALLIATIVE CARE: ICD-10-CM

## 2023-01-01 DIAGNOSIS — R06.00 DYSPNEA, UNSPECIFIED: ICD-10-CM

## 2023-01-01 DIAGNOSIS — J96.01 ACUTE RESPIRATORY FAILURE WITH HYPOXIA: ICD-10-CM

## 2023-01-01 DIAGNOSIS — F03.90 UNSPECIFIED DEMENTIA WITHOUT BEHAVIORAL DISTURBANCE: ICD-10-CM

## 2023-01-01 LAB
ALBUMIN SERPL ELPH-MCNC: 2.2 G/DL — LOW (ref 3.5–5)
ALBUMIN SERPL ELPH-MCNC: 2.4 G/DL — LOW (ref 3.5–5)
ALBUMIN SERPL ELPH-MCNC: 3.1 G/DL — LOW (ref 3.5–5)
ALP SERPL-CCNC: 106 U/L — SIGNIFICANT CHANGE UP (ref 40–120)
ALP SERPL-CCNC: 78 U/L — SIGNIFICANT CHANGE UP (ref 40–120)
ALP SERPL-CCNC: 81 U/L — SIGNIFICANT CHANGE UP (ref 40–120)
ALT FLD-CCNC: 12 U/L DA — SIGNIFICANT CHANGE UP (ref 10–60)
ALT FLD-CCNC: 7 U/L DA — LOW (ref 10–60)
ALT FLD-CCNC: 8 U/L DA — LOW (ref 10–60)
ANION GAP SERPL CALC-SCNC: 7 MMOL/L — SIGNIFICANT CHANGE UP (ref 5–17)
ANION GAP SERPL CALC-SCNC: 7 MMOL/L — SIGNIFICANT CHANGE UP (ref 5–17)
ANION GAP SERPL CALC-SCNC: 8 MMOL/L — SIGNIFICANT CHANGE UP (ref 5–17)
APPEARANCE UR: CLEAR — SIGNIFICANT CHANGE UP
APTT BLD: 28.2 SEC — SIGNIFICANT CHANGE UP (ref 27.5–35.5)
AST SERPL-CCNC: 10 U/L — SIGNIFICANT CHANGE UP (ref 10–40)
AST SERPL-CCNC: 11 U/L — SIGNIFICANT CHANGE UP (ref 10–40)
AST SERPL-CCNC: 12 U/L — SIGNIFICANT CHANGE UP (ref 10–40)
BACTERIA # UR AUTO: ABNORMAL /HPF
BASOPHILS # BLD AUTO: 0.03 K/UL — SIGNIFICANT CHANGE UP (ref 0–0.2)
BASOPHILS # BLD AUTO: 0.04 K/UL — SIGNIFICANT CHANGE UP (ref 0–0.2)
BASOPHILS NFR BLD AUTO: 0.3 % — SIGNIFICANT CHANGE UP (ref 0–2)
BASOPHILS NFR BLD AUTO: 0.4 % — SIGNIFICANT CHANGE UP (ref 0–2)
BILIRUB SERPL-MCNC: 0.4 MG/DL — SIGNIFICANT CHANGE UP (ref 0.2–1.2)
BILIRUB SERPL-MCNC: 0.4 MG/DL — SIGNIFICANT CHANGE UP (ref 0.2–1.2)
BILIRUB SERPL-MCNC: 0.6 MG/DL — SIGNIFICANT CHANGE UP (ref 0.2–1.2)
BILIRUB UR-MCNC: NEGATIVE — SIGNIFICANT CHANGE UP
BUN SERPL-MCNC: 15 MG/DL — SIGNIFICANT CHANGE UP (ref 7–18)
BUN SERPL-MCNC: 23 MG/DL — HIGH (ref 7–18)
BUN SERPL-MCNC: 9 MG/DL — SIGNIFICANT CHANGE UP (ref 7–18)
CALCIUM SERPL-MCNC: 8 MG/DL — LOW (ref 8.4–10.5)
CALCIUM SERPL-MCNC: 8.2 MG/DL — LOW (ref 8.4–10.5)
CALCIUM SERPL-MCNC: 8.9 MG/DL — SIGNIFICANT CHANGE UP (ref 8.4–10.5)
CHLORIDE SERPL-SCNC: 108 MMOL/L — SIGNIFICANT CHANGE UP (ref 96–108)
CHLORIDE SERPL-SCNC: 110 MMOL/L — HIGH (ref 96–108)
CHLORIDE SERPL-SCNC: 111 MMOL/L — HIGH (ref 96–108)
CO2 SERPL-SCNC: 23 MMOL/L — SIGNIFICANT CHANGE UP (ref 22–31)
CO2 SERPL-SCNC: 24 MMOL/L — SIGNIFICANT CHANGE UP (ref 22–31)
CO2 SERPL-SCNC: 26 MMOL/L — SIGNIFICANT CHANGE UP (ref 22–31)
COLOR SPEC: YELLOW — SIGNIFICANT CHANGE UP
COMMENT - URINE: SIGNIFICANT CHANGE UP
CREAT SERPL-MCNC: 0.33 MG/DL — LOW (ref 0.5–1.3)
CREAT SERPL-MCNC: 0.45 MG/DL — LOW (ref 0.5–1.3)
CREAT SERPL-MCNC: 0.65 MG/DL — SIGNIFICANT CHANGE UP (ref 0.5–1.3)
CULTURE RESULTS: NO GROWTH — SIGNIFICANT CHANGE UP
CULTURE RESULTS: SIGNIFICANT CHANGE UP
CULTURE RESULTS: SIGNIFICANT CHANGE UP
DIFF PNL FLD: ABNORMAL
EGFR: 100 ML/MIN/1.73M2 — SIGNIFICANT CHANGE UP
EGFR: 85 ML/MIN/1.73M2 — SIGNIFICANT CHANGE UP
EGFR: 92 ML/MIN/1.73M2 — SIGNIFICANT CHANGE UP
EOSINOPHIL # BLD AUTO: 0.03 K/UL — SIGNIFICANT CHANGE UP (ref 0–0.5)
EOSINOPHIL # BLD AUTO: 0.24 K/UL — SIGNIFICANT CHANGE UP (ref 0–0.5)
EOSINOPHIL NFR BLD AUTO: 0.2 % — SIGNIFICANT CHANGE UP (ref 0–6)
EOSINOPHIL NFR BLD AUTO: 3.1 % — SIGNIFICANT CHANGE UP (ref 0–6)
EPI CELLS # UR: ABNORMAL /HPF
GLUCOSE BLDC GLUCOMTR-MCNC: 105 MG/DL — HIGH (ref 70–99)
GLUCOSE BLDC GLUCOMTR-MCNC: 106 MG/DL — HIGH (ref 70–99)
GLUCOSE BLDC GLUCOMTR-MCNC: 107 MG/DL — HIGH (ref 70–99)
GLUCOSE BLDC GLUCOMTR-MCNC: 109 MG/DL — HIGH (ref 70–99)
GLUCOSE BLDC GLUCOMTR-MCNC: 130 MG/DL — HIGH (ref 70–99)
GLUCOSE BLDC GLUCOMTR-MCNC: 135 MG/DL — HIGH (ref 70–99)
GLUCOSE BLDC GLUCOMTR-MCNC: 151 MG/DL — HIGH (ref 70–99)
GLUCOSE BLDC GLUCOMTR-MCNC: 76 MG/DL — SIGNIFICANT CHANGE UP (ref 70–99)
GLUCOSE BLDC GLUCOMTR-MCNC: 80 MG/DL — SIGNIFICANT CHANGE UP (ref 70–99)
GLUCOSE BLDC GLUCOMTR-MCNC: 82 MG/DL — SIGNIFICANT CHANGE UP (ref 70–99)
GLUCOSE BLDC GLUCOMTR-MCNC: 84 MG/DL — SIGNIFICANT CHANGE UP (ref 70–99)
GLUCOSE BLDC GLUCOMTR-MCNC: 85 MG/DL — SIGNIFICANT CHANGE UP (ref 70–99)
GLUCOSE BLDC GLUCOMTR-MCNC: 98 MG/DL — SIGNIFICANT CHANGE UP (ref 70–99)
GLUCOSE BLDC GLUCOMTR-MCNC: 99 MG/DL — SIGNIFICANT CHANGE UP (ref 70–99)
GLUCOSE SERPL-MCNC: 191 MG/DL — HIGH (ref 70–99)
GLUCOSE SERPL-MCNC: 90 MG/DL — SIGNIFICANT CHANGE UP (ref 70–99)
GLUCOSE SERPL-MCNC: 90 MG/DL — SIGNIFICANT CHANGE UP (ref 70–99)
GLUCOSE UR QL: 50 MG/DL
GRAN CASTS # UR COMP ASSIST: ABNORMAL /LPF
HCT VFR BLD CALC: 31.4 % — LOW (ref 34.5–45)
HCT VFR BLD CALC: 33.5 % — LOW (ref 34.5–45)
HCT VFR BLD CALC: 42.9 % — SIGNIFICANT CHANGE UP (ref 34.5–45)
HGB BLD-MCNC: 10.7 G/DL — LOW (ref 11.5–15.5)
HGB BLD-MCNC: 11 G/DL — LOW (ref 11.5–15.5)
HGB BLD-MCNC: 14.2 G/DL — SIGNIFICANT CHANGE UP (ref 11.5–15.5)
HYALINE CASTS # UR AUTO: ABNORMAL /LPF
IMM GRANULOCYTES NFR BLD AUTO: 0.5 % — SIGNIFICANT CHANGE UP (ref 0–0.9)
IMM GRANULOCYTES NFR BLD AUTO: 0.7 % — SIGNIFICANT CHANGE UP (ref 0–0.9)
INR BLD: 1.27 RATIO — HIGH (ref 0.88–1.16)
KETONES UR-MCNC: ABNORMAL
LACTATE SERPL-SCNC: 1.8 MMOL/L — SIGNIFICANT CHANGE UP (ref 0.7–2)
LACTATE SERPL-SCNC: 2.3 MMOL/L — HIGH (ref 0.7–2)
LEUKOCYTE ESTERASE UR-ACNC: NEGATIVE — SIGNIFICANT CHANGE UP
LYMPHOCYTES # BLD AUTO: 0.32 K/UL — LOW (ref 1–3.3)
LYMPHOCYTES # BLD AUTO: 1.19 K/UL — SIGNIFICANT CHANGE UP (ref 1–3.3)
LYMPHOCYTES # BLD AUTO: 15.1 % — SIGNIFICANT CHANGE UP (ref 13–44)
LYMPHOCYTES # BLD AUTO: 2.1 % — LOW (ref 13–44)
MAGNESIUM SERPL-MCNC: 1.9 MG/DL — SIGNIFICANT CHANGE UP (ref 1.6–2.6)
MAGNESIUM SERPL-MCNC: 2 MG/DL — SIGNIFICANT CHANGE UP (ref 1.6–2.6)
MANUAL SMEAR VERIFICATION: SIGNIFICANT CHANGE UP
MCHC RBC-ENTMCNC: 29.4 PG — SIGNIFICANT CHANGE UP (ref 27–34)
MCHC RBC-ENTMCNC: 29.8 PG — SIGNIFICANT CHANGE UP (ref 27–34)
MCHC RBC-ENTMCNC: 29.9 PG — SIGNIFICANT CHANGE UP (ref 27–34)
MCHC RBC-ENTMCNC: 32.8 GM/DL — SIGNIFICANT CHANGE UP (ref 32–36)
MCHC RBC-ENTMCNC: 33.1 GM/DL — SIGNIFICANT CHANGE UP (ref 32–36)
MCHC RBC-ENTMCNC: 34.1 GM/DL — SIGNIFICANT CHANGE UP (ref 32–36)
MCV RBC AUTO: 87.7 FL — SIGNIFICANT CHANGE UP (ref 80–100)
MCV RBC AUTO: 89.6 FL — SIGNIFICANT CHANGE UP (ref 80–100)
MCV RBC AUTO: 90.1 FL — SIGNIFICANT CHANGE UP (ref 80–100)
MONOCYTES # BLD AUTO: 0.7 K/UL — SIGNIFICANT CHANGE UP (ref 0–0.9)
MONOCYTES # BLD AUTO: 0.95 K/UL — HIGH (ref 0–0.9)
MONOCYTES NFR BLD AUTO: 6.3 % — SIGNIFICANT CHANGE UP (ref 2–14)
MONOCYTES NFR BLD AUTO: 8.9 % — SIGNIFICANT CHANGE UP (ref 2–14)
NEUTROPHILS # BLD AUTO: 13.63 K/UL — HIGH (ref 1.8–7.4)
NEUTROPHILS # BLD AUTO: 5.66 K/UL — SIGNIFICANT CHANGE UP (ref 1.8–7.4)
NEUTROPHILS NFR BLD AUTO: 72 % — SIGNIFICANT CHANGE UP (ref 43–77)
NEUTROPHILS NFR BLD AUTO: 90.4 % — HIGH (ref 43–77)
NITRITE UR-MCNC: NEGATIVE — SIGNIFICANT CHANGE UP
NRBC # BLD: 0 /100 WBCS — SIGNIFICANT CHANGE UP (ref 0–0)
PH UR: 5 — SIGNIFICANT CHANGE UP (ref 5–8)
PHOSPHATE SERPL-MCNC: 2 MG/DL — LOW (ref 2.5–4.5)
PHOSPHATE SERPL-MCNC: 2.5 MG/DL — SIGNIFICANT CHANGE UP (ref 2.5–4.5)
PLAT MORPH BLD: NORMAL — SIGNIFICANT CHANGE UP
PLATELET # BLD AUTO: 147 K/UL — LOW (ref 150–400)
PLATELET # BLD AUTO: 155 K/UL — SIGNIFICANT CHANGE UP (ref 150–400)
PLATELET # BLD AUTO: 183 K/UL — SIGNIFICANT CHANGE UP (ref 150–400)
PLATELET COUNT - ESTIMATE: NORMAL — SIGNIFICANT CHANGE UP
POTASSIUM SERPL-MCNC: 3 MMOL/L — LOW (ref 3.5–5.3)
POTASSIUM SERPL-MCNC: 3.4 MMOL/L — LOW (ref 3.5–5.3)
POTASSIUM SERPL-MCNC: 3.6 MMOL/L — SIGNIFICANT CHANGE UP (ref 3.5–5.3)
POTASSIUM SERPL-SCNC: 3 MMOL/L — LOW (ref 3.5–5.3)
POTASSIUM SERPL-SCNC: 3.4 MMOL/L — LOW (ref 3.5–5.3)
POTASSIUM SERPL-SCNC: 3.6 MMOL/L — SIGNIFICANT CHANGE UP (ref 3.5–5.3)
PROT SERPL-MCNC: 6 G/DL — SIGNIFICANT CHANGE UP (ref 6–8.3)
PROT SERPL-MCNC: 6 G/DL — SIGNIFICANT CHANGE UP (ref 6–8.3)
PROT SERPL-MCNC: 7.6 G/DL — SIGNIFICANT CHANGE UP (ref 6–8.3)
PROT UR-MCNC: 100
PROTHROM AB SERPL-ACNC: 15.1 SEC — HIGH (ref 10.5–13.4)
RAPID RVP RESULT: DETECTED
RBC # BLD: 3.58 M/UL — LOW (ref 3.8–5.2)
RBC # BLD: 3.74 M/UL — LOW (ref 3.8–5.2)
RBC # BLD: 4.76 M/UL — SIGNIFICANT CHANGE UP (ref 3.8–5.2)
RBC # FLD: 13.5 % — SIGNIFICANT CHANGE UP (ref 10.3–14.5)
RBC # FLD: 13.9 % — SIGNIFICANT CHANGE UP (ref 10.3–14.5)
RBC # FLD: 13.9 % — SIGNIFICANT CHANGE UP (ref 10.3–14.5)
RBC BLD AUTO: NORMAL — SIGNIFICANT CHANGE UP
RBC CASTS # UR COMP ASSIST: ABNORMAL /HPF (ref 0–2)
RV+EV RNA SPEC QL NAA+PROBE: DETECTED
SARS-COV-2 RNA SPEC QL NAA+PROBE: SIGNIFICANT CHANGE UP
SODIUM SERPL-SCNC: 139 MMOL/L — SIGNIFICANT CHANGE UP (ref 135–145)
SODIUM SERPL-SCNC: 141 MMOL/L — SIGNIFICANT CHANGE UP (ref 135–145)
SODIUM SERPL-SCNC: 144 MMOL/L — SIGNIFICANT CHANGE UP (ref 135–145)
SP GR SPEC: 1.02 — SIGNIFICANT CHANGE UP (ref 1.01–1.02)
SPECIMEN SOURCE: SIGNIFICANT CHANGE UP
UROBILINOGEN FLD QL: NEGATIVE — SIGNIFICANT CHANGE UP
WBC # BLD: 15.07 K/UL — HIGH (ref 3.8–10.5)
WBC # BLD: 7.86 K/UL — SIGNIFICANT CHANGE UP (ref 3.8–10.5)
WBC # BLD: 9.28 K/UL — SIGNIFICANT CHANGE UP (ref 3.8–10.5)
WBC # FLD AUTO: 15.07 K/UL — HIGH (ref 3.8–10.5)
WBC # FLD AUTO: 7.86 K/UL — SIGNIFICANT CHANGE UP (ref 3.8–10.5)
WBC # FLD AUTO: 9.28 K/UL — SIGNIFICANT CHANGE UP (ref 3.8–10.5)
WBC UR QL: SIGNIFICANT CHANGE UP /HPF (ref 0–5)

## 2023-01-01 PROCEDURE — 82962 GLUCOSE BLOOD TEST: CPT

## 2023-01-01 PROCEDURE — 87040 BLOOD CULTURE FOR BACTERIA: CPT

## 2023-01-01 PROCEDURE — 71045 X-RAY EXAM CHEST 1 VIEW: CPT | Mod: 26

## 2023-01-01 PROCEDURE — 83735 ASSAY OF MAGNESIUM: CPT

## 2023-01-01 PROCEDURE — 85610 PROTHROMBIN TIME: CPT

## 2023-01-01 PROCEDURE — 81001 URINALYSIS AUTO W/SCOPE: CPT

## 2023-01-01 PROCEDURE — 99233 SBSQ HOSP IP/OBS HIGH 50: CPT | Mod: GC

## 2023-01-01 PROCEDURE — 93005 ELECTROCARDIOGRAM TRACING: CPT

## 2023-01-01 PROCEDURE — 87086 URINE CULTURE/COLONY COUNT: CPT

## 2023-01-01 PROCEDURE — 99232 SBSQ HOSP IP/OBS MODERATE 35: CPT

## 2023-01-01 PROCEDURE — 99222 1ST HOSP IP/OBS MODERATE 55: CPT

## 2023-01-01 PROCEDURE — 71275 CT ANGIOGRAPHY CHEST: CPT | Mod: MA

## 2023-01-01 PROCEDURE — 36415 COLL VENOUS BLD VENIPUNCTURE: CPT

## 2023-01-01 PROCEDURE — 85027 COMPLETE CBC AUTOMATED: CPT

## 2023-01-01 PROCEDURE — 99233 SBSQ HOSP IP/OBS HIGH 50: CPT

## 2023-01-01 PROCEDURE — 71275 CT ANGIOGRAPHY CHEST: CPT | Mod: 26

## 2023-01-01 PROCEDURE — 92610 EVALUATE SWALLOWING FUNCTION: CPT

## 2023-01-01 PROCEDURE — 99497 ADVNCD CARE PLAN 30 MIN: CPT | Mod: 25

## 2023-01-01 PROCEDURE — 83605 ASSAY OF LACTIC ACID: CPT

## 2023-01-01 PROCEDURE — 99223 1ST HOSP IP/OBS HIGH 75: CPT

## 2023-01-01 PROCEDURE — 85025 COMPLETE CBC W/AUTO DIFF WBC: CPT

## 2023-01-01 PROCEDURE — 99232 SBSQ HOSP IP/OBS MODERATE 35: CPT | Mod: 25

## 2023-01-01 PROCEDURE — ZZZZZ: CPT

## 2023-01-01 PROCEDURE — 85730 THROMBOPLASTIN TIME PARTIAL: CPT

## 2023-01-01 PROCEDURE — 84100 ASSAY OF PHOSPHORUS: CPT

## 2023-01-01 PROCEDURE — 80053 COMPREHEN METABOLIC PANEL: CPT

## 2023-01-01 PROCEDURE — 99285 EMERGENCY DEPT VISIT HI MDM: CPT

## 2023-01-01 PROCEDURE — 99232 SBSQ HOSP IP/OBS MODERATE 35: CPT | Mod: GC

## 2023-01-01 PROCEDURE — 0225U NFCT DS DNA&RNA 21 SARSCOV2: CPT

## 2023-01-01 PROCEDURE — 99223 1ST HOSP IP/OBS HIGH 75: CPT | Mod: GC

## 2023-01-01 PROCEDURE — 71045 X-RAY EXAM CHEST 1 VIEW: CPT

## 2023-01-01 PROCEDURE — 94640 AIRWAY INHALATION TREATMENT: CPT

## 2023-01-01 RX ORDER — SODIUM CHLORIDE 9 MG/ML
1000 INJECTION, SOLUTION INTRAVENOUS
Refills: 0 | Status: DISCONTINUED | OUTPATIENT
Start: 2023-01-01 | End: 2023-01-01

## 2023-01-01 RX ORDER — IPRATROPIUM/ALBUTEROL SULFATE 18-103MCG
3 AEROSOL WITH ADAPTER (GRAM) INHALATION EVERY 6 HOURS
Refills: 0 | Status: DISCONTINUED | OUTPATIENT
Start: 2023-01-01 | End: 2023-01-01

## 2023-01-01 RX ORDER — MORPHINE SULFATE 50 MG/1
3 CAPSULE, EXTENDED RELEASE ORAL EVERY 4 HOURS
Refills: 0 | Status: DISCONTINUED | OUTPATIENT
Start: 2023-01-01 | End: 2023-01-01

## 2023-01-01 RX ORDER — MORPHINE SULFATE 50 MG/1
5 CAPSULE, EXTENDED RELEASE ORAL EVERY 4 HOURS
Refills: 0 | Status: DISCONTINUED | OUTPATIENT
Start: 2023-01-01 | End: 2023-01-01

## 2023-01-01 RX ORDER — ROBINUL 0.2 MG/ML
0.4 INJECTION INTRAMUSCULAR; INTRAVENOUS EVERY 4 HOURS
Refills: 0 | Status: DISCONTINUED | OUTPATIENT
Start: 2023-01-01 | End: 2023-01-01

## 2023-01-01 RX ORDER — MORPHINE SULFATE 50 MG/1
4 CAPSULE, EXTENDED RELEASE ORAL EVERY 4 HOURS
Refills: 0 | Status: DISCONTINUED | OUTPATIENT
Start: 2023-01-01 | End: 2023-01-01

## 2023-01-01 RX ORDER — POLYETHYLENE GLYCOL 3350 17 G/17G
17 POWDER, FOR SOLUTION ORAL DAILY
Refills: 0 | Status: DISCONTINUED | OUTPATIENT
Start: 2023-01-01 | End: 2023-01-01

## 2023-01-01 RX ORDER — MORPHINE SULFATE 50 MG/1
2 CAPSULE, EXTENDED RELEASE ORAL
Qty: 100 | Refills: 0 | Status: DISCONTINUED | OUTPATIENT
Start: 2023-01-01 | End: 2023-01-01

## 2023-01-01 RX ORDER — MORPHINE SULFATE 50 MG/1
2 CAPSULE, EXTENDED RELEASE ORAL EVERY 4 HOURS
Refills: 0 | Status: DISCONTINUED | OUTPATIENT
Start: 2023-01-01 | End: 2023-01-01

## 2023-01-01 RX ORDER — MORPHINE SULFATE 50 MG/1
2 CAPSULE, EXTENDED RELEASE ORAL
Refills: 0 | Status: DISCONTINUED | OUTPATIENT
Start: 2023-01-01 | End: 2023-01-01

## 2023-01-01 RX ORDER — ROBINUL 0.2 MG/ML
0.4 INJECTION INTRAMUSCULAR; INTRAVENOUS
Qty: 0 | Refills: 0 | DISCHARGE
Start: 2023-01-01

## 2023-01-01 RX ORDER — MORPHINE SULFATE 50 MG/1
2 CAPSULE, EXTENDED RELEASE ORAL
Qty: 0 | Refills: 0 | DISCHARGE
Start: 2023-01-01

## 2023-01-01 RX ORDER — AZITHROMYCIN 500 MG/1
500 TABLET, FILM COATED ORAL EVERY 24 HOURS
Refills: 0 | Status: DISCONTINUED | OUTPATIENT
Start: 2023-01-01 | End: 2023-01-01

## 2023-01-01 RX ORDER — SCOPALAMINE 1 MG/3D
1 PATCH, EXTENDED RELEASE TRANSDERMAL
Refills: 0 | Status: DISCONTINUED | OUTPATIENT
Start: 2023-01-01 | End: 2023-01-01

## 2023-01-01 RX ORDER — CEFTRIAXONE 500 MG/1
1000 INJECTION, POWDER, FOR SOLUTION INTRAMUSCULAR; INTRAVENOUS EVERY 24 HOURS
Refills: 0 | Status: DISCONTINUED | OUTPATIENT
Start: 2023-01-01 | End: 2023-01-01

## 2023-01-01 RX ORDER — ATORVASTATIN CALCIUM 80 MG/1
1 TABLET, FILM COATED ORAL
Qty: 0 | Refills: 0 | DISCHARGE

## 2023-01-01 RX ORDER — SODIUM CHLORIDE 9 MG/ML
4 INJECTION INTRAMUSCULAR; INTRAVENOUS; SUBCUTANEOUS EVERY 12 HOURS
Refills: 0 | Status: DISCONTINUED | OUTPATIENT
Start: 2023-01-01 | End: 2023-01-01

## 2023-01-01 RX ORDER — INSULIN LISPRO 100/ML
VIAL (ML) SUBCUTANEOUS EVERY 6 HOURS
Refills: 0 | Status: DISCONTINUED | OUTPATIENT
Start: 2023-01-01 | End: 2023-01-01

## 2023-01-01 RX ORDER — CARBIDOPA AND LEVODOPA 25; 100 MG/1; MG/1
1.5 TABLET ORAL
Qty: 0 | Refills: 0 | DISCHARGE

## 2023-01-01 RX ORDER — ACETAMINOPHEN 500 MG
650 TABLET ORAL EVERY 6 HOURS
Refills: 0 | Status: DISCONTINUED | OUTPATIENT
Start: 2023-01-01 | End: 2023-01-01

## 2023-01-01 RX ORDER — CARBIDOPA AND LEVODOPA 25; 100 MG/1; MG/1
1 TABLET ORAL
Qty: 0 | Refills: 0 | DISCHARGE

## 2023-01-01 RX ORDER — POTASSIUM CHLORIDE 20 MEQ
10 PACKET (EA) ORAL
Refills: 0 | Status: COMPLETED | OUTPATIENT
Start: 2023-01-01 | End: 2023-01-01

## 2023-01-01 RX ORDER — MORPHINE SULFATE 50 MG/1
5 CAPSULE, EXTENDED RELEASE ORAL
Refills: 0 | Status: DISCONTINUED | OUTPATIENT
Start: 2023-01-01 | End: 2023-01-01

## 2023-01-01 RX ORDER — SODIUM CHLORIDE 9 MG/ML
1200 INJECTION INTRAMUSCULAR; INTRAVENOUS; SUBCUTANEOUS ONCE
Refills: 0 | Status: COMPLETED | OUTPATIENT
Start: 2023-01-01 | End: 2023-01-01

## 2023-01-01 RX ORDER — ROBINUL 0.2 MG/ML
0.4 INJECTION INTRAMUSCULAR; INTRAVENOUS EVERY 6 HOURS
Refills: 0 | Status: DISCONTINUED | OUTPATIENT
Start: 2023-01-01 | End: 2023-01-01

## 2023-01-01 RX ORDER — FAMOTIDINE 10 MG/ML
1 INJECTION INTRAVENOUS
Qty: 0 | Refills: 0 | DISCHARGE

## 2023-01-01 RX ORDER — CARBIDOPA AND LEVODOPA 25; 100 MG/1; MG/1
1 TABLET ORAL
Refills: 0 | Status: DISCONTINUED | OUTPATIENT
Start: 2023-01-01 | End: 2023-01-01

## 2023-01-01 RX ORDER — SENNA PLUS 8.6 MG/1
2 TABLET ORAL AT BEDTIME
Refills: 0 | Status: DISCONTINUED | OUTPATIENT
Start: 2023-01-01 | End: 2023-01-01

## 2023-01-01 RX ORDER — CEFTRIAXONE 500 MG/1
1000 INJECTION, POWDER, FOR SOLUTION INTRAMUSCULAR; INTRAVENOUS ONCE
Refills: 0 | Status: COMPLETED | OUTPATIENT
Start: 2023-01-01 | End: 2023-01-01

## 2023-01-01 RX ORDER — ENOXAPARIN SODIUM 100 MG/ML
40 INJECTION SUBCUTANEOUS EVERY 24 HOURS
Refills: 0 | Status: DISCONTINUED | OUTPATIENT
Start: 2023-01-01 | End: 2023-01-01

## 2023-01-01 RX ORDER — POTASSIUM PHOSPHATE, MONOBASIC POTASSIUM PHOSPHATE, DIBASIC 236; 224 MG/ML; MG/ML
30 INJECTION, SOLUTION INTRAVENOUS ONCE
Refills: 0 | Status: COMPLETED | OUTPATIENT
Start: 2023-01-01 | End: 2023-01-01

## 2023-01-01 RX ORDER — AZITHROMYCIN 500 MG/1
500 TABLET, FILM COATED ORAL ONCE
Refills: 0 | Status: COMPLETED | OUTPATIENT
Start: 2023-01-01 | End: 2023-01-01

## 2023-01-01 RX ORDER — INSULIN LISPRO 100/ML
VIAL (ML) SUBCUTANEOUS
Refills: 0 | Status: DISCONTINUED | OUTPATIENT
Start: 2023-01-01 | End: 2023-01-01

## 2023-01-01 RX ORDER — SODIUM CHLORIDE 9 MG/ML
4 INJECTION INTRAMUSCULAR; INTRAVENOUS; SUBCUTANEOUS
Qty: 0 | Refills: 0 | DISCHARGE
Start: 2023-01-01

## 2023-01-01 RX ORDER — FAMOTIDINE 10 MG/ML
40 INJECTION INTRAVENOUS DAILY
Refills: 0 | Status: DISCONTINUED | OUTPATIENT
Start: 2023-01-01 | End: 2023-01-01

## 2023-01-01 RX ADMIN — POTASSIUM PHOSPHATE, MONOBASIC POTASSIUM PHOSPHATE, DIBASIC 83.33 MILLIMOLE(S): 236; 224 INJECTION, SOLUTION INTRAVENOUS at 15:40

## 2023-01-01 RX ADMIN — MORPHINE SULFATE 3 MILLIGRAM(S): 50 CAPSULE, EXTENDED RELEASE ORAL at 05:55

## 2023-01-01 RX ADMIN — MORPHINE SULFATE 2 MILLIGRAM(S): 50 CAPSULE, EXTENDED RELEASE ORAL at 22:09

## 2023-01-01 RX ADMIN — ROBINUL 0.4 MILLIGRAM(S): 0.2 INJECTION INTRAMUSCULAR; INTRAVENOUS at 01:56

## 2023-01-01 RX ADMIN — Medication 100 MILLIEQUIVALENT(S): at 12:30

## 2023-01-01 RX ADMIN — MORPHINE SULFATE 3 MILLIGRAM(S): 50 CAPSULE, EXTENDED RELEASE ORAL at 14:29

## 2023-01-01 RX ADMIN — Medication 650 MILLIGRAM(S): at 06:30

## 2023-01-01 RX ADMIN — MORPHINE SULFATE 4 MILLIGRAM(S): 50 CAPSULE, EXTENDED RELEASE ORAL at 01:56

## 2023-01-01 RX ADMIN — SODIUM CHLORIDE 50 MILLILITER(S): 9 INJECTION, SOLUTION INTRAVENOUS at 17:56

## 2023-01-01 RX ADMIN — MORPHINE SULFATE 2 MILLIGRAM(S): 50 CAPSULE, EXTENDED RELEASE ORAL at 14:00

## 2023-01-01 RX ADMIN — MORPHINE SULFATE 3 MILLIGRAM(S): 50 CAPSULE, EXTENDED RELEASE ORAL at 21:34

## 2023-01-01 RX ADMIN — MORPHINE SULFATE 2 MILLIGRAM(S): 50 CAPSULE, EXTENDED RELEASE ORAL at 14:35

## 2023-01-01 RX ADMIN — MORPHINE SULFATE 4 MILLIGRAM(S): 50 CAPSULE, EXTENDED RELEASE ORAL at 18:40

## 2023-01-01 RX ADMIN — MORPHINE SULFATE 4 MILLIGRAM(S): 50 CAPSULE, EXTENDED RELEASE ORAL at 22:07

## 2023-01-01 RX ADMIN — MORPHINE SULFATE 2 MILLIGRAM(S): 50 CAPSULE, EXTENDED RELEASE ORAL at 02:42

## 2023-01-01 RX ADMIN — MORPHINE SULFATE 4 MILLIGRAM(S): 50 CAPSULE, EXTENDED RELEASE ORAL at 21:30

## 2023-01-01 RX ADMIN — MORPHINE SULFATE 4 MILLIGRAM(S): 50 CAPSULE, EXTENDED RELEASE ORAL at 09:45

## 2023-01-01 RX ADMIN — MORPHINE SULFATE 2 MILLIGRAM(S): 50 CAPSULE, EXTENDED RELEASE ORAL at 18:51

## 2023-01-01 RX ADMIN — MORPHINE SULFATE 2 MILLIGRAM(S): 50 CAPSULE, EXTENDED RELEASE ORAL at 05:01

## 2023-01-01 RX ADMIN — CARBIDOPA AND LEVODOPA 1 TABLET(S): 25; 100 TABLET ORAL at 08:35

## 2023-01-01 RX ADMIN — MORPHINE SULFATE 2 MILLIGRAM(S): 50 CAPSULE, EXTENDED RELEASE ORAL at 14:31

## 2023-01-01 RX ADMIN — MORPHINE SULFATE 2 MILLIGRAM(S): 50 CAPSULE, EXTENDED RELEASE ORAL at 05:16

## 2023-01-01 RX ADMIN — SCOPALAMINE 1 PATCH: 1 PATCH, EXTENDED RELEASE TRANSDERMAL at 21:15

## 2023-01-01 RX ADMIN — MORPHINE SULFATE 3 MILLIGRAM(S): 50 CAPSULE, EXTENDED RELEASE ORAL at 22:00

## 2023-01-01 RX ADMIN — SCOPALAMINE 1 PATCH: 1 PATCH, EXTENDED RELEASE TRANSDERMAL at 07:44

## 2023-01-01 RX ADMIN — SCOPALAMINE 1 PATCH: 1 PATCH, EXTENDED RELEASE TRANSDERMAL at 05:54

## 2023-01-01 RX ADMIN — MORPHINE SULFATE 4 MILLIGRAM(S): 50 CAPSULE, EXTENDED RELEASE ORAL at 06:45

## 2023-01-01 RX ADMIN — ROBINUL 0.4 MILLIGRAM(S): 0.2 INJECTION INTRAMUSCULAR; INTRAVENOUS at 14:11

## 2023-01-01 RX ADMIN — MORPHINE SULFATE 4 MILLIGRAM(S): 50 CAPSULE, EXTENDED RELEASE ORAL at 06:30

## 2023-01-01 RX ADMIN — SCOPALAMINE 1 PATCH: 1 PATCH, EXTENDED RELEASE TRANSDERMAL at 19:57

## 2023-01-01 RX ADMIN — MORPHINE SULFATE 4 MILLIGRAM(S): 50 CAPSULE, EXTENDED RELEASE ORAL at 13:33

## 2023-01-01 RX ADMIN — SCOPALAMINE 1 PATCH: 1 PATCH, EXTENDED RELEASE TRANSDERMAL at 07:26

## 2023-01-01 RX ADMIN — ROBINUL 0.4 MILLIGRAM(S): 0.2 INJECTION INTRAMUSCULAR; INTRAVENOUS at 14:12

## 2023-01-01 RX ADMIN — Medication 100 MILLIEQUIVALENT(S): at 10:14

## 2023-01-01 RX ADMIN — ROBINUL 0.4 MILLIGRAM(S): 0.2 INJECTION INTRAMUSCULAR; INTRAVENOUS at 14:00

## 2023-01-01 RX ADMIN — MORPHINE SULFATE 2 MILLIGRAM(S): 50 CAPSULE, EXTENDED RELEASE ORAL at 14:45

## 2023-01-01 RX ADMIN — ROBINUL 0.4 MILLIGRAM(S): 0.2 INJECTION INTRAMUSCULAR; INTRAVENOUS at 10:25

## 2023-01-01 RX ADMIN — Medication 650 MILLIGRAM(S): at 06:45

## 2023-01-01 RX ADMIN — SCOPALAMINE 1 PATCH: 1 PATCH, EXTENDED RELEASE TRANSDERMAL at 19:27

## 2023-01-01 RX ADMIN — ROBINUL 0.4 MILLIGRAM(S): 0.2 INJECTION INTRAMUSCULAR; INTRAVENOUS at 17:49

## 2023-01-01 RX ADMIN — ROBINUL 0.4 MILLIGRAM(S): 0.2 INJECTION INTRAMUSCULAR; INTRAVENOUS at 21:32

## 2023-01-01 RX ADMIN — CARBIDOPA AND LEVODOPA 1 TABLET(S): 25; 100 TABLET ORAL at 20:12

## 2023-01-01 RX ADMIN — MORPHINE SULFATE 2 MILLIGRAM(S): 50 CAPSULE, EXTENDED RELEASE ORAL at 17:49

## 2023-01-01 RX ADMIN — SCOPALAMINE 1 PATCH: 1 PATCH, EXTENDED RELEASE TRANSDERMAL at 07:10

## 2023-01-01 RX ADMIN — MORPHINE SULFATE 4 MILLIGRAM(S): 50 CAPSULE, EXTENDED RELEASE ORAL at 18:07

## 2023-01-01 RX ADMIN — ROBINUL 0.4 MILLIGRAM(S): 0.2 INJECTION INTRAMUSCULAR; INTRAVENOUS at 01:24

## 2023-01-01 RX ADMIN — MORPHINE SULFATE 2 MILLIGRAM(S): 50 CAPSULE, EXTENDED RELEASE ORAL at 17:15

## 2023-01-01 RX ADMIN — MORPHINE SULFATE 2 MILLIGRAM(S): 50 CAPSULE, EXTENDED RELEASE ORAL at 09:53

## 2023-01-01 RX ADMIN — SCOPALAMINE 1 PATCH: 1 PATCH, EXTENDED RELEASE TRANSDERMAL at 19:39

## 2023-01-01 RX ADMIN — Medication 3 MILLILITER(S): at 20:21

## 2023-01-01 RX ADMIN — MORPHINE SULFATE 2 MILLIGRAM(S): 50 CAPSULE, EXTENDED RELEASE ORAL at 21:09

## 2023-01-01 RX ADMIN — MORPHINE SULFATE 4 MILLIGRAM(S): 50 CAPSULE, EXTENDED RELEASE ORAL at 02:20

## 2023-01-01 RX ADMIN — MORPHINE SULFATE 4 MILLIGRAM(S): 50 CAPSULE, EXTENDED RELEASE ORAL at 02:15

## 2023-01-01 RX ADMIN — MORPHINE SULFATE 2 MILLIGRAM(S): 50 CAPSULE, EXTENDED RELEASE ORAL at 06:27

## 2023-01-01 RX ADMIN — MORPHINE SULFATE 4 MILLIGRAM(S): 50 CAPSULE, EXTENDED RELEASE ORAL at 14:30

## 2023-01-01 RX ADMIN — SCOPALAMINE 1 PATCH: 1 PATCH, EXTENDED RELEASE TRANSDERMAL at 06:01

## 2023-01-01 RX ADMIN — SCOPALAMINE 1 PATCH: 1 PATCH, EXTENDED RELEASE TRANSDERMAL at 05:23

## 2023-01-01 RX ADMIN — MORPHINE SULFATE 2 MILLIGRAM(S): 50 CAPSULE, EXTENDED RELEASE ORAL at 18:16

## 2023-01-01 RX ADMIN — MORPHINE SULFATE 3 MILLIGRAM(S): 50 CAPSULE, EXTENDED RELEASE ORAL at 09:53

## 2023-01-01 RX ADMIN — Medication 100 MILLIEQUIVALENT(S): at 11:16

## 2023-01-01 RX ADMIN — ROBINUL 0.4 MILLIGRAM(S): 0.2 INJECTION INTRAMUSCULAR; INTRAVENOUS at 16:07

## 2023-01-01 RX ADMIN — MORPHINE SULFATE 2 MILLIGRAM(S): 50 CAPSULE, EXTENDED RELEASE ORAL at 10:43

## 2023-01-01 RX ADMIN — ROBINUL 0.4 MILLIGRAM(S): 0.2 INJECTION INTRAMUSCULAR; INTRAVENOUS at 11:23

## 2023-01-01 RX ADMIN — MORPHINE SULFATE 4 MILLIGRAM(S): 50 CAPSULE, EXTENDED RELEASE ORAL at 18:01

## 2023-01-01 RX ADMIN — SODIUM CHLORIDE 1200 MILLILITER(S): 9 INJECTION INTRAMUSCULAR; INTRAVENOUS; SUBCUTANEOUS at 12:18

## 2023-01-01 RX ADMIN — ROBINUL 0.4 MILLIGRAM(S): 0.2 INJECTION INTRAMUSCULAR; INTRAVENOUS at 06:14

## 2023-01-01 RX ADMIN — MORPHINE SULFATE 4 MILLIGRAM(S): 50 CAPSULE, EXTENDED RELEASE ORAL at 14:12

## 2023-01-01 RX ADMIN — MORPHINE SULFATE 2 MILLIGRAM(S): 50 CAPSULE, EXTENDED RELEASE ORAL at 14:23

## 2023-01-01 RX ADMIN — MORPHINE SULFATE 2 MILLIGRAM(S): 50 CAPSULE, EXTENDED RELEASE ORAL at 14:56

## 2023-01-01 RX ADMIN — SCOPALAMINE 1 PATCH: 1 PATCH, EXTENDED RELEASE TRANSDERMAL at 19:41

## 2023-01-01 RX ADMIN — MORPHINE SULFATE 2 MILLIGRAM(S): 50 CAPSULE, EXTENDED RELEASE ORAL at 10:55

## 2023-01-01 RX ADMIN — MORPHINE SULFATE 4 MILLIGRAM(S): 50 CAPSULE, EXTENDED RELEASE ORAL at 11:20

## 2023-01-01 RX ADMIN — MORPHINE SULFATE 4 MILLIGRAM(S): 50 CAPSULE, EXTENDED RELEASE ORAL at 06:15

## 2023-01-01 RX ADMIN — ROBINUL 0.4 MILLIGRAM(S): 0.2 INJECTION INTRAMUSCULAR; INTRAVENOUS at 17:21

## 2023-01-01 RX ADMIN — MORPHINE SULFATE 2 MILLIGRAM(S): 50 CAPSULE, EXTENDED RELEASE ORAL at 03:44

## 2023-01-01 RX ADMIN — SCOPALAMINE 1 PATCH: 1 PATCH, EXTENDED RELEASE TRANSDERMAL at 07:49

## 2023-01-01 RX ADMIN — CEFTRIAXONE 100 MILLIGRAM(S): 500 INJECTION, POWDER, FOR SOLUTION INTRAMUSCULAR; INTRAVENOUS at 23:35

## 2023-01-01 RX ADMIN — CEFTRIAXONE 100 MILLIGRAM(S): 500 INJECTION, POWDER, FOR SOLUTION INTRAMUSCULAR; INTRAVENOUS at 00:24

## 2023-01-01 RX ADMIN — MORPHINE SULFATE 2 MILLIGRAM(S): 50 CAPSULE, EXTENDED RELEASE ORAL at 05:08

## 2023-01-01 RX ADMIN — MORPHINE SULFATE 2 MILLIGRAM(S): 50 CAPSULE, EXTENDED RELEASE ORAL at 21:14

## 2023-01-01 RX ADMIN — ROBINUL 0.4 MILLIGRAM(S): 0.2 INJECTION INTRAMUSCULAR; INTRAVENOUS at 16:24

## 2023-01-01 RX ADMIN — SCOPALAMINE 1 PATCH: 1 PATCH, EXTENDED RELEASE TRANSDERMAL at 05:56

## 2023-01-01 RX ADMIN — ROBINUL 0.4 MILLIGRAM(S): 0.2 INJECTION INTRAMUSCULAR; INTRAVENOUS at 08:41

## 2023-01-01 RX ADMIN — MORPHINE SULFATE 2 MILLIGRAM(S): 50 CAPSULE, EXTENDED RELEASE ORAL at 22:02

## 2023-01-01 RX ADMIN — MORPHINE SULFATE 2 MILLIGRAM(S): 50 CAPSULE, EXTENDED RELEASE ORAL at 10:25

## 2023-01-01 RX ADMIN — ROBINUL 0.4 MILLIGRAM(S): 0.2 INJECTION INTRAMUSCULAR; INTRAVENOUS at 05:56

## 2023-01-01 RX ADMIN — MORPHINE SULFATE 4 MILLIGRAM(S): 50 CAPSULE, EXTENDED RELEASE ORAL at 06:11

## 2023-01-01 RX ADMIN — MORPHINE SULFATE 4 MILLIGRAM(S): 50 CAPSULE, EXTENDED RELEASE ORAL at 10:03

## 2023-01-01 RX ADMIN — MORPHINE SULFATE 4 MILLIGRAM(S): 50 CAPSULE, EXTENDED RELEASE ORAL at 14:57

## 2023-01-01 RX ADMIN — ROBINUL 0.4 MILLIGRAM(S): 0.2 INJECTION INTRAMUSCULAR; INTRAVENOUS at 16:31

## 2023-01-01 RX ADMIN — MORPHINE SULFATE 4 MILLIGRAM(S): 50 CAPSULE, EXTENDED RELEASE ORAL at 02:43

## 2023-01-01 RX ADMIN — MORPHINE SULFATE 2 MILLIGRAM(S): 50 CAPSULE, EXTENDED RELEASE ORAL at 13:14

## 2023-01-01 RX ADMIN — MORPHINE SULFATE 2 MILLIGRAM(S): 50 CAPSULE, EXTENDED RELEASE ORAL at 18:15

## 2023-01-01 RX ADMIN — Medication 10 MILLIGRAM(S): at 15:40

## 2023-01-01 RX ADMIN — MORPHINE SULFATE 2 MILLIGRAM(S): 50 CAPSULE, EXTENDED RELEASE ORAL at 01:24

## 2023-01-01 RX ADMIN — MORPHINE SULFATE 4 MILLIGRAM(S): 50 CAPSULE, EXTENDED RELEASE ORAL at 21:40

## 2023-01-01 RX ADMIN — MORPHINE SULFATE 4 MILLIGRAM(S): 50 CAPSULE, EXTENDED RELEASE ORAL at 17:21

## 2023-01-01 RX ADMIN — Medication 3 MILLILITER(S): at 08:48

## 2023-01-01 RX ADMIN — MORPHINE SULFATE 2 MILLIGRAM(S): 50 CAPSULE, EXTENDED RELEASE ORAL at 18:36

## 2023-01-01 RX ADMIN — Medication 3 MILLILITER(S): at 21:03

## 2023-01-01 RX ADMIN — MORPHINE SULFATE 4 MILLIGRAM(S): 50 CAPSULE, EXTENDED RELEASE ORAL at 18:30

## 2023-01-01 RX ADMIN — ROBINUL 0.4 MILLIGRAM(S): 0.2 INJECTION INTRAMUSCULAR; INTRAVENOUS at 22:02

## 2023-01-01 RX ADMIN — MORPHINE SULFATE 2 MILLIGRAM(S): 50 CAPSULE, EXTENDED RELEASE ORAL at 09:38

## 2023-01-01 RX ADMIN — SCOPALAMINE 1 PATCH: 1 PATCH, EXTENDED RELEASE TRANSDERMAL at 19:25

## 2023-01-01 RX ADMIN — MORPHINE SULFATE 4 MILLIGRAM(S): 50 CAPSULE, EXTENDED RELEASE ORAL at 02:30

## 2023-01-01 RX ADMIN — ROBINUL 0.4 MILLIGRAM(S): 0.2 INJECTION INTRAMUSCULAR; INTRAVENOUS at 14:08

## 2023-01-01 RX ADMIN — MORPHINE SULFATE 2 MILLIGRAM(S): 50 CAPSULE, EXTENDED RELEASE ORAL at 05:30

## 2023-01-01 RX ADMIN — MORPHINE SULFATE 2 MILLIGRAM(S): 50 CAPSULE, EXTENDED RELEASE ORAL at 16:26

## 2023-01-01 RX ADMIN — AZITHROMYCIN 500 MILLIGRAM(S): 500 TABLET, FILM COATED ORAL at 13:33

## 2023-01-01 RX ADMIN — SCOPALAMINE 1 PATCH: 1 PATCH, EXTENDED RELEASE TRANSDERMAL at 10:14

## 2023-01-01 RX ADMIN — MORPHINE SULFATE 2 MILLIGRAM(S): 50 CAPSULE, EXTENDED RELEASE ORAL at 10:27

## 2023-01-01 RX ADMIN — MORPHINE SULFATE 2 MILLIGRAM(S): 50 CAPSULE, EXTENDED RELEASE ORAL at 09:57

## 2023-01-01 RX ADMIN — AZITHROMYCIN 255 MILLIGRAM(S): 500 TABLET, FILM COATED ORAL at 01:04

## 2023-01-01 RX ADMIN — SODIUM CHLORIDE 50 MILLILITER(S): 9 INJECTION, SOLUTION INTRAVENOUS at 20:46

## 2023-01-01 RX ADMIN — Medication 3 MILLILITER(S): at 08:03

## 2023-01-01 RX ADMIN — Medication 3 MILLILITER(S): at 03:07

## 2023-01-01 RX ADMIN — SCOPALAMINE 1 PATCH: 1 PATCH, EXTENDED RELEASE TRANSDERMAL at 07:50

## 2023-01-01 RX ADMIN — MORPHINE SULFATE 2 MILLIGRAM(S): 50 CAPSULE, EXTENDED RELEASE ORAL at 15:50

## 2023-01-01 RX ADMIN — MORPHINE SULFATE 2 MILLIGRAM(S): 50 CAPSULE, EXTENDED RELEASE ORAL at 13:41

## 2023-01-01 RX ADMIN — SCOPALAMINE 1 PATCH: 1 PATCH, EXTENDED RELEASE TRANSDERMAL at 19:55

## 2023-01-01 RX ADMIN — ROBINUL 0.4 MILLIGRAM(S): 0.2 INJECTION INTRAMUSCULAR; INTRAVENOUS at 01:50

## 2023-01-01 RX ADMIN — MORPHINE SULFATE 2 MG/HR: 50 CAPSULE, EXTENDED RELEASE ORAL at 11:05

## 2023-01-01 RX ADMIN — ROBINUL 0.4 MILLIGRAM(S): 0.2 INJECTION INTRAMUSCULAR; INTRAVENOUS at 21:30

## 2023-01-01 RX ADMIN — SODIUM CHLORIDE 50 MILLILITER(S): 9 INJECTION, SOLUTION INTRAVENOUS at 00:14

## 2023-01-01 RX ADMIN — MORPHINE SULFATE 4 MILLIGRAM(S): 50 CAPSULE, EXTENDED RELEASE ORAL at 10:16

## 2023-01-01 RX ADMIN — MORPHINE SULFATE 2 MILLIGRAM(S): 50 CAPSULE, EXTENDED RELEASE ORAL at 05:58

## 2023-01-01 RX ADMIN — Medication 3 MILLILITER(S): at 03:21

## 2023-01-01 RX ADMIN — CARBIDOPA AND LEVODOPA 1 TABLET(S): 25; 100 TABLET ORAL at 12:30

## 2023-01-01 RX ADMIN — MORPHINE SULFATE 3 MILLIGRAM(S): 50 CAPSULE, EXTENDED RELEASE ORAL at 10:15

## 2023-01-01 RX ADMIN — SODIUM CHLORIDE 4 MILLILITER(S): 9 INJECTION INTRAMUSCULAR; INTRAVENOUS; SUBCUTANEOUS at 14:22

## 2023-01-01 RX ADMIN — MORPHINE SULFATE 4 MILLIGRAM(S): 50 CAPSULE, EXTENDED RELEASE ORAL at 10:39

## 2023-01-01 RX ADMIN — MORPHINE SULFATE 2 MILLIGRAM(S): 50 CAPSULE, EXTENDED RELEASE ORAL at 18:19

## 2023-01-01 RX ADMIN — MORPHINE SULFATE 4 MILLIGRAM(S): 50 CAPSULE, EXTENDED RELEASE ORAL at 02:45

## 2023-01-01 RX ADMIN — MORPHINE SULFATE 2 MILLIGRAM(S): 50 CAPSULE, EXTENDED RELEASE ORAL at 17:59

## 2023-01-01 RX ADMIN — ROBINUL 0.4 MILLIGRAM(S): 0.2 INJECTION INTRAMUSCULAR; INTRAVENOUS at 09:53

## 2023-01-01 RX ADMIN — SCOPALAMINE 1 PATCH: 1 PATCH, EXTENDED RELEASE TRANSDERMAL at 21:29

## 2023-01-01 RX ADMIN — ROBINUL 0.4 MILLIGRAM(S): 0.2 INJECTION INTRAMUSCULAR; INTRAVENOUS at 18:40

## 2023-01-01 RX ADMIN — ROBINUL 0.4 MILLIGRAM(S): 0.2 INJECTION INTRAMUSCULAR; INTRAVENOUS at 05:27

## 2023-01-01 RX ADMIN — SCOPALAMINE 1 PATCH: 1 PATCH, EXTENDED RELEASE TRANSDERMAL at 19:01

## 2023-01-01 RX ADMIN — ROBINUL 0.4 MILLIGRAM(S): 0.2 INJECTION INTRAMUSCULAR; INTRAVENOUS at 10:17

## 2023-01-01 RX ADMIN — ROBINUL 0.4 MILLIGRAM(S): 0.2 INJECTION INTRAMUSCULAR; INTRAVENOUS at 02:37

## 2023-01-01 RX ADMIN — MORPHINE SULFATE 2 MILLIGRAM(S): 50 CAPSULE, EXTENDED RELEASE ORAL at 14:07

## 2023-01-01 RX ADMIN — Medication 1 MILLIGRAM(S): at 14:46

## 2023-01-01 RX ADMIN — ROBINUL 0.4 MILLIGRAM(S): 0.2 INJECTION INTRAMUSCULAR; INTRAVENOUS at 22:36

## 2023-01-01 RX ADMIN — SCOPALAMINE 1 PATCH: 1 PATCH, EXTENDED RELEASE TRANSDERMAL at 08:07

## 2023-01-01 RX ADMIN — MORPHINE SULFATE 4 MILLIGRAM(S): 50 CAPSULE, EXTENDED RELEASE ORAL at 22:34

## 2023-01-01 RX ADMIN — Medication 3 MILLILITER(S): at 14:21

## 2023-01-01 RX ADMIN — MORPHINE SULFATE 2 MILLIGRAM(S): 50 CAPSULE, EXTENDED RELEASE ORAL at 21:28

## 2023-01-01 RX ADMIN — ROBINUL 0.4 MILLIGRAM(S): 0.2 INJECTION INTRAMUSCULAR; INTRAVENOUS at 18:07

## 2023-01-01 RX ADMIN — MORPHINE SULFATE 4 MILLIGRAM(S): 50 CAPSULE, EXTENDED RELEASE ORAL at 09:30

## 2023-01-01 RX ADMIN — ROBINUL 0.4 MILLIGRAM(S): 0.2 INJECTION INTRAMUSCULAR; INTRAVENOUS at 10:16

## 2023-01-01 RX ADMIN — MORPHINE SULFATE 2 MILLIGRAM(S): 50 CAPSULE, EXTENDED RELEASE ORAL at 15:05

## 2023-01-01 RX ADMIN — MORPHINE SULFATE 4 MILLIGRAM(S): 50 CAPSULE, EXTENDED RELEASE ORAL at 22:00

## 2023-01-01 RX ADMIN — MORPHINE SULFATE 2 MILLIGRAM(S): 50 CAPSULE, EXTENDED RELEASE ORAL at 10:45

## 2023-01-01 RX ADMIN — MORPHINE SULFATE 2 MILLIGRAM(S): 50 CAPSULE, EXTENDED RELEASE ORAL at 21:43

## 2023-01-01 RX ADMIN — MORPHINE SULFATE 2 MILLIGRAM(S): 50 CAPSULE, EXTENDED RELEASE ORAL at 01:37

## 2023-01-01 RX ADMIN — MORPHINE SULFATE 2 MILLIGRAM(S): 50 CAPSULE, EXTENDED RELEASE ORAL at 14:08

## 2023-01-01 RX ADMIN — MORPHINE SULFATE 2 MILLIGRAM(S): 50 CAPSULE, EXTENDED RELEASE ORAL at 10:17

## 2023-01-01 RX ADMIN — AZITHROMYCIN 255 MILLIGRAM(S): 500 TABLET, FILM COATED ORAL at 12:33

## 2023-01-01 RX ADMIN — MORPHINE SULFATE 4 MILLIGRAM(S): 50 CAPSULE, EXTENDED RELEASE ORAL at 02:29

## 2023-01-01 RX ADMIN — MORPHINE SULFATE 4 MILLIGRAM(S): 50 CAPSULE, EXTENDED RELEASE ORAL at 17:25

## 2023-01-01 RX ADMIN — MORPHINE SULFATE 2 MILLIGRAM(S): 50 CAPSULE, EXTENDED RELEASE ORAL at 05:43

## 2023-01-01 RX ADMIN — Medication 650 MILLIGRAM(S): at 22:09

## 2023-01-01 RX ADMIN — ENOXAPARIN SODIUM 40 MILLIGRAM(S): 100 INJECTION SUBCUTANEOUS at 17:04

## 2023-01-01 RX ADMIN — Medication 3 MILLILITER(S): at 20:35

## 2023-01-01 RX ADMIN — MORPHINE SULFATE 2 MILLIGRAM(S): 50 CAPSULE, EXTENDED RELEASE ORAL at 17:45

## 2023-01-01 RX ADMIN — Medication 100 MILLIEQUIVALENT(S): at 14:38

## 2023-01-01 RX ADMIN — CEFTRIAXONE 100 MILLIGRAM(S): 500 INJECTION, POWDER, FOR SOLUTION INTRAMUSCULAR; INTRAVENOUS at 12:33

## 2023-01-01 RX ADMIN — SODIUM CHLORIDE 1200 MILLILITER(S): 9 INJECTION INTRAMUSCULAR; INTRAVENOUS; SUBCUTANEOUS at 13:18

## 2023-01-01 RX ADMIN — ROBINUL 0.4 MILLIGRAM(S): 0.2 INJECTION INTRAMUSCULAR; INTRAVENOUS at 09:57

## 2023-01-01 RX ADMIN — Medication 10 MILLIGRAM(S): at 06:43

## 2023-01-01 RX ADMIN — MORPHINE SULFATE 2 MILLIGRAM(S): 50 CAPSULE, EXTENDED RELEASE ORAL at 18:00

## 2023-01-01 RX ADMIN — Medication 1 MILLIGRAM(S): at 04:26

## 2023-01-01 RX ADMIN — MORPHINE SULFATE 4 MILLIGRAM(S): 50 CAPSULE, EXTENDED RELEASE ORAL at 11:07

## 2023-01-01 RX ADMIN — CEFTRIAXONE 1000 MILLIGRAM(S): 500 INJECTION, POWDER, FOR SOLUTION INTRAMUSCULAR; INTRAVENOUS at 13:04

## 2023-01-01 RX ADMIN — MORPHINE SULFATE 2 MILLIGRAM(S): 50 CAPSULE, EXTENDED RELEASE ORAL at 10:15

## 2023-01-01 RX ADMIN — MORPHINE SULFATE 2 MILLIGRAM(S): 50 CAPSULE, EXTENDED RELEASE ORAL at 06:54

## 2023-01-01 RX ADMIN — MORPHINE SULFATE 2 MILLIGRAM(S): 50 CAPSULE, EXTENDED RELEASE ORAL at 03:59

## 2023-01-01 RX ADMIN — ROBINUL 0.4 MILLIGRAM(S): 0.2 INJECTION INTRAMUSCULAR; INTRAVENOUS at 10:05

## 2023-01-01 RX ADMIN — MORPHINE SULFATE 2 MILLIGRAM(S): 50 CAPSULE, EXTENDED RELEASE ORAL at 14:50

## 2023-01-01 RX ADMIN — ROBINUL 0.4 MILLIGRAM(S): 0.2 INJECTION INTRAMUSCULAR; INTRAVENOUS at 13:15

## 2023-01-01 RX ADMIN — MORPHINE SULFATE 3 MILLIGRAM(S): 50 CAPSULE, EXTENDED RELEASE ORAL at 01:22

## 2023-01-01 RX ADMIN — Medication 100 MILLIEQUIVALENT(S): at 12:39

## 2023-01-01 RX ADMIN — SCOPALAMINE 1 PATCH: 1 PATCH, EXTENDED RELEASE TRANSDERMAL at 19:02

## 2023-01-01 RX ADMIN — ROBINUL 0.4 MILLIGRAM(S): 0.2 INJECTION INTRAMUSCULAR; INTRAVENOUS at 21:28

## 2023-01-01 RX ADMIN — MORPHINE SULFATE 4 MILLIGRAM(S): 50 CAPSULE, EXTENDED RELEASE ORAL at 22:50

## 2023-01-01 RX ADMIN — Medication 650 MILLIGRAM(S): at 23:12

## 2023-01-01 RX ADMIN — MORPHINE SULFATE 2 MILLIGRAM(S): 50 CAPSULE, EXTENDED RELEASE ORAL at 15:23

## 2023-01-01 RX ADMIN — MORPHINE SULFATE 3 MILLIGRAM(S): 50 CAPSULE, EXTENDED RELEASE ORAL at 01:58

## 2023-01-01 RX ADMIN — MORPHINE SULFATE 2 MILLIGRAM(S): 50 CAPSULE, EXTENDED RELEASE ORAL at 21:04

## 2023-01-01 RX ADMIN — ROBINUL 0.4 MILLIGRAM(S): 0.2 INJECTION INTRAMUSCULAR; INTRAVENOUS at 17:59

## 2023-01-01 RX ADMIN — Medication 3 MILLILITER(S): at 08:28

## 2023-01-01 RX ADMIN — MORPHINE SULFATE 2 MILLIGRAM(S): 50 CAPSULE, EXTENDED RELEASE ORAL at 17:03

## 2023-01-01 RX ADMIN — MORPHINE SULFATE 2 MILLIGRAM(S): 50 CAPSULE, EXTENDED RELEASE ORAL at 01:33

## 2023-01-01 RX ADMIN — MORPHINE SULFATE 2 MILLIGRAM(S): 50 CAPSULE, EXTENDED RELEASE ORAL at 21:31

## 2023-01-01 RX ADMIN — MORPHINE SULFATE 3 MILLIGRAM(S): 50 CAPSULE, EXTENDED RELEASE ORAL at 17:42

## 2023-01-01 RX ADMIN — MORPHINE SULFATE 2 MILLIGRAM(S): 50 CAPSULE, EXTENDED RELEASE ORAL at 14:58

## 2023-01-01 RX ADMIN — AZITHROMYCIN 255 MILLIGRAM(S): 500 TABLET, FILM COATED ORAL at 00:24

## 2023-01-01 RX ADMIN — MORPHINE SULFATE 3 MILLIGRAM(S): 50 CAPSULE, EXTENDED RELEASE ORAL at 14:11

## 2023-01-01 RX ADMIN — MORPHINE SULFATE 4 MILLIGRAM(S): 50 CAPSULE, EXTENDED RELEASE ORAL at 10:22

## 2023-01-01 RX ADMIN — Medication 3 MILLILITER(S): at 14:47

## 2023-01-01 RX ADMIN — MORPHINE SULFATE 4 MILLIGRAM(S): 50 CAPSULE, EXTENDED RELEASE ORAL at 13:34

## 2023-01-01 RX ADMIN — MORPHINE SULFATE 2 MILLIGRAM(S): 50 CAPSULE, EXTENDED RELEASE ORAL at 02:34

## 2023-01-01 RX ADMIN — MORPHINE SULFATE 2 MILLIGRAM(S): 50 CAPSULE, EXTENDED RELEASE ORAL at 05:56

## 2023-01-01 RX ADMIN — MORPHINE SULFATE 4 MILLIGRAM(S): 50 CAPSULE, EXTENDED RELEASE ORAL at 18:12

## 2023-01-01 RX ADMIN — SCOPALAMINE 1 PATCH: 1 PATCH, EXTENDED RELEASE TRANSDERMAL at 06:57

## 2023-01-01 RX ADMIN — SCOPALAMINE 1 PATCH: 1 PATCH, EXTENDED RELEASE TRANSDERMAL at 19:30

## 2023-01-01 RX ADMIN — MORPHINE SULFATE 2 MILLIGRAM(S): 50 CAPSULE, EXTENDED RELEASE ORAL at 18:42

## 2023-01-01 RX ADMIN — ROBINUL 0.4 MILLIGRAM(S): 0.2 INJECTION INTRAMUSCULAR; INTRAVENOUS at 18:02

## 2023-01-01 RX ADMIN — MORPHINE SULFATE 2 MILLIGRAM(S): 50 CAPSULE, EXTENDED RELEASE ORAL at 05:27

## 2023-01-01 RX ADMIN — Medication 100 MILLIEQUIVALENT(S): at 13:38

## 2023-01-01 RX ADMIN — MORPHINE SULFATE 2 MILLIGRAM(S): 50 CAPSULE, EXTENDED RELEASE ORAL at 21:36

## 2023-01-01 RX ADMIN — MORPHINE SULFATE 4 MILLIGRAM(S): 50 CAPSULE, EXTENDED RELEASE ORAL at 21:41

## 2023-01-01 RX ADMIN — SCOPALAMINE 1 PATCH: 1 PATCH, EXTENDED RELEASE TRANSDERMAL at 07:27

## 2023-01-01 RX ADMIN — MORPHINE SULFATE 2 MILLIGRAM(S): 50 CAPSULE, EXTENDED RELEASE ORAL at 14:20

## 2023-01-01 RX ADMIN — SCOPALAMINE 1 PATCH: 1 PATCH, EXTENDED RELEASE TRANSDERMAL at 07:29

## 2023-01-01 RX ADMIN — MORPHINE SULFATE 2 MILLIGRAM(S): 50 CAPSULE, EXTENDED RELEASE ORAL at 02:08

## 2023-01-01 RX ADMIN — Medication 1 MILLIGRAM(S): at 16:04

## 2023-01-01 RX ADMIN — ROBINUL 0.4 MILLIGRAM(S): 0.2 INJECTION INTRAMUSCULAR; INTRAVENOUS at 18:15

## 2023-01-01 RX ADMIN — MORPHINE SULFATE 3 MILLIGRAM(S): 50 CAPSULE, EXTENDED RELEASE ORAL at 07:49

## 2023-01-01 RX ADMIN — MORPHINE SULFATE 2 MILLIGRAM(S): 50 CAPSULE, EXTENDED RELEASE ORAL at 17:57

## 2023-01-01 RX ADMIN — CARBIDOPA AND LEVODOPA 1 TABLET(S): 25; 100 TABLET ORAL at 17:00

## 2023-01-01 RX ADMIN — MORPHINE SULFATE 2 MILLIGRAM(S): 50 CAPSULE, EXTENDED RELEASE ORAL at 17:55

## 2023-01-01 RX ADMIN — MORPHINE SULFATE 2 MILLIGRAM(S): 50 CAPSULE, EXTENDED RELEASE ORAL at 09:43

## 2023-01-01 RX ADMIN — ENOXAPARIN SODIUM 40 MILLIGRAM(S): 100 INJECTION SUBCUTANEOUS at 18:15

## 2023-01-01 RX ADMIN — MORPHINE SULFATE 2 MILLIGRAM(S): 50 CAPSULE, EXTENDED RELEASE ORAL at 10:18

## 2023-01-01 RX ADMIN — MORPHINE SULFATE 2 MILLIGRAM(S): 50 CAPSULE, EXTENDED RELEASE ORAL at 21:45

## 2023-01-01 RX ADMIN — MORPHINE SULFATE 4 MILLIGRAM(S): 50 CAPSULE, EXTENDED RELEASE ORAL at 06:41

## 2023-01-01 RX ADMIN — SCOPALAMINE 1 PATCH: 1 PATCH, EXTENDED RELEASE TRANSDERMAL at 21:37

## 2023-01-01 RX ADMIN — MORPHINE SULFATE 2 MILLIGRAM(S): 50 CAPSULE, EXTENDED RELEASE ORAL at 01:50

## 2023-01-01 RX ADMIN — MORPHINE SULFATE 2 MILLIGRAM(S): 50 CAPSULE, EXTENDED RELEASE ORAL at 21:27

## 2023-01-01 RX ADMIN — MORPHINE SULFATE 4 MILLIGRAM(S): 50 CAPSULE, EXTENDED RELEASE ORAL at 21:58

## 2023-01-01 RX ADMIN — MORPHINE SULFATE 3 MILLIGRAM(S): 50 CAPSULE, EXTENDED RELEASE ORAL at 17:32

## 2023-03-12 NOTE — ED PROVIDER NOTE - OBJECTIVE STATEMENT
88-year-old woman with a history of Parkinson's dementia brought in by family for decreased responsiveness/altered mental status.  History is being provided by the son and aide at bedside.  They report noting that the patient has been coughing over the past 2 to 3 days which usually does not and has been less responsive than usual.  No reported sick contacts.  Found by EMS to have low sats in the field here stabilized on 3 L nasal cannula.  Not on O2 at baseline

## 2023-03-12 NOTE — H&P ADULT - ATTENDING COMMENTS
89yo F PMHx of Parkinsons Disease, GERD who presented with acute hypoxic respiratory failure. Per daughter patient has been looking off for the last 3 days.  called EMS today because patient appeared to be having difficulty coughing up sputum today and was short of breath. Patient at baseline is non-verbal, bed bound with contractures in lower extremities. Needs to be lifted into a sitting position. Patient not previously hospitalized for many years, but daughter understands patient's decline. However other family members conflicted and want to continue aggressive care. No sick contacts. In the ED patient placed on 3L NC, tachycardic. CXR with possible RLL infiltrate on my read. CTA shows tracheomalacia, tracheal secretions, negative for PE.    #Acute Hypoxic Respiratory Failure  #Sepsis due to Pneumonia  #Enterovirus Infection with suspected Bacterial Infection, possible aspiration  #Tracheomalacia  #Parkinson's Disease  #Functional Quadriplegia  #GERD    -O2 to maintain saturation > 92%  -start on ceftriaxone and azithromycin for possible bacteriall super infection  -unclear if able to clear secretions, start on duonebs and expectorant  -pulmonary consult  -continue on levidopa-carbidopa dissolvable  -failed bedside swallow, speech consult  -turn and position patient  -continue with famotidine  -palliative consult

## 2023-03-12 NOTE — H&P ADULT - PROBLEM SELECTOR PLAN 3
Pt w/ chronic constipation  will start on senna and miralax Pt w/ chronic constipation  Will start bisacodyl PRN

## 2023-03-12 NOTE — ED ADULT NURSE NOTE - INTERVENTIONS DEFINITIONS
Non-slip footwear when patient is off stretcher/Stretcher in lowest position, wheels locked, appropriate side rails in place/Monitor for mental status changes and reorient to person, place, and time

## 2023-03-12 NOTE — ED ADULT NURSE NOTE - OBJECTIVE STATEMENT
patient's son states the patient had trouble coughing up phlegm and decreased appetite for the past 3 days. No complaints of nausea, vomiting, fever, chills.

## 2023-03-12 NOTE — ED PROVIDER NOTE - PHYSICAL EXAMINATION
Exam:  General: Patient ill appearing, hypoxic on room air otherwise vital signs within normal limits  HEENT: airway patent with moist mucous membranes  Cardiac: RRR S1/S2 with strong peripheral pulses  Respiratory: coarse expiratory breath sounds  GI: abdomen soft, non tender, non distended  Skin: warm, well perfused

## 2023-03-12 NOTE — PATIENT PROFILE ADULT - FALL HARM RISK - HARM RISK INTERVENTIONS

## 2023-03-12 NOTE — H&P ADULT - ASSESSMENT
This is a  89 yo F from home, lives with , bedbound with 24hr HHA PMHx of Parkinson's disease, dementia, HTN, HLD presenting to the ED for worsening mental status admitted for AHRF 2/2 Rhinovirus

## 2023-03-12 NOTE — H&P ADULT - PROBLEM SELECTOR PLAN 5
Lovenox for DVT ppx due to advanced Parkinsons disease, bedbound  requires total assist with all activities

## 2023-03-12 NOTE — H&P ADULT - CONVERSATION DETAILS
Discussed GOC with daughter and HCP,  Sveta Baugh, who was at bedside and with . Daughter states that she is not ready to make any decisions at the moment and would want to see how her mother does during her hospitalization.

## 2023-03-12 NOTE — H&P ADULT - HISTORY OF PRESENT ILLNESS
This is a  87 yo F from home, lives with , bedbound with 24hr HHA PMHx of Parkinson's disease, dementia, HTN, HLD presenting to the ED for worsening mental status.  and daughter at bedside providing collateral, state that patient's mental status appears to be worse and she hasn't been eating much,   noticed that today she was bringing up phlegm and she appeared to have difficulty swallowing. Family deny that she has had a cough. They deny any sick contacts, and she hasn't had any fevers or chills.

## 2023-03-12 NOTE — H&P ADULT - PROBLEM SELECTOR PLAN 1
Pt w/  s/s Pt w/ sepsis (HR 95, WBC 15), and AHRF on 3L NC  CXR and CTA showing no consolidations  Enterovirus +  Will start on Azithro and CTX for now, will dc if cultures are negative  f/u Mycoplasma, Strep PNA, and legionall  F/u Bcx  F/u s/s Pt w/ sepsis (HR 95, WBC 15), and AHRF on 3L NC  CXR and CTA showing no consolidations  Enterovirus +  Will start on Azithro and CTX for now, will dc if cultures are negative  f/u Mycoplasma, Strep PNA, and legionella  F/u Bcx  Pulm consult Pt w/ sepsis (HR 95, WBC 15), and AHRF on 3L NC  CXR and CTA showing no consolidations  Enterovirus +  Will start on Azithro and CTX for now, will dc if cultures are negative  f/u Mycoplasma, Strep PNA, and legionella  F/u Bcx  Pulm consulted Dr. Sotomayor

## 2023-03-12 NOTE — H&P ADULT - NSHPPHYSICALEXAM_GEN_ALL_CORE
LOS:     VITALS:   T(C): 36.3 (03-12-23 @ 15:39), Max: 37.3 (03-12-23 @ 10:34)  HR: 88 (03-12-23 @ 15:39) (88 - 109)  BP: 153/91 (03-12-23 @ 15:39) (143/74 - 153/91)  RR: 19 (03-12-23 @ 15:39) (17 - 19)  SpO2: 98% (03-12-23 @ 15:39) (95% - 99%)    GENERAL: NAD, elderly lady  HEAD:  Atraumatic, Normocephalic  EYES: EOMI, PERRLA, conjunctiva and sclera clear  ENT: Moist mucous membranes  NECK: Supple, No JVD  CHEST/LUNG: b/l rhonchi, No rales, rhonchi, wheezing, or rubs. Unlabored respirations  HEART: Regular rate and rhythm; No murmurs, rubs, or gallops  ABDOMEN: BSx4; Soft, nontender, nondistended  EXTREMITIES:  2+ Peripheral Pulses, brisk capillary refill. No clubbing, cyanosis, or edema  NERVOUS SYSTEM:  Does not follow commands  SKIN: No rashes or lesions

## 2023-03-12 NOTE — H&P ADULT - PROBLEM SELECTOR PLAN 2
Pt w/ hx of PD, bedbound, minimally verbal at baseline  c/w home med of Carbidopa/Levodopa Pt w/ hx of PD, bedbound, minimally verbal at baseline  c/w home med of Carbidopa/Levodopa  Will require Palliative consult in AM Pt w/ hx of PD, bedbound, minimally verbal at baseline  c/w home med of Carbidopa/Levodopa  Will require Palliative consult in AM  F/u S/S as pt failed dysphagia screen

## 2023-03-12 NOTE — ED PROVIDER NOTE - CLINICAL SUMMARY MEDICAL DECISION MAKING FREE TEXT BOX
Patient presenting with decreased mental status in setting of new hypoxia and abnormal breath sounds.  Suspect underlying pulmonary infection question whether bacterial or viral.  We will start sepsis bundle in the emergency room also check RVP.  We will also obtain urinalysis and evaluate for other potential causes of change in mental status.  Patient will require admission at this time given new hypoxia.

## 2023-03-12 NOTE — ED PROVIDER NOTE - PROGRESS NOTE DETAILS
Patient CXR read by radiology as no consolidation, I am concerned for a possible process in the RLL/RML on my interpretation.  CTA ordered for further evaluation.  Ordered rocephin/azithro for possible coverage of CAP.  Still requiring supplemental O2 - will require admission for further care.  Discussed with hospitalists who will accept.

## 2023-03-13 NOTE — CONSULT NOTE ADULT - TIME BILLING
- Review of chart (documentation, labs/studies, VS trends)  - Personal review of chest imaging  - Medication reconciliation  - Bedside interview and physical examination  - Bedside SpO2 monitoring and supplemental O2 titration

## 2023-03-13 NOTE — PROGRESS NOTE ADULT - PROBLEM SELECTOR PLAN 2
Pt w/ hx of PD, bedbound, minimally verbal at baseline  c/w home med of Carbidopa/Levodopa  Will require Palliative consult in AM  Failed formal Speech and swallow  NPO except meds

## 2023-03-13 NOTE — PROGRESS NOTE ADULT - PROBLEM SELECTOR PLAN 1
Pt w/ sepsis (HR 95, WBC 15), and AHRF on 3L NC  CXR and CTA showing no consolidations  Enterovirus +  c/w Azithro and CTX for now, will dc if cultures are negative  f/u Mycoplasma,  Strep PNA, and legionella negative  BCx NGTD  Pulm consulted Dr. Sotomayor  chest PT  hypertonic saline

## 2023-03-13 NOTE — SWALLOW BEDSIDE ASSESSMENT ADULT - SWALLOW EVAL: SECRETION MANAGEMENT
Pooling of secretions suctioned prior to PO trial/pooling/problems swallowing secretions/wet upper airway/breath sounds

## 2023-03-13 NOTE — SWALLOW BEDSIDE ASSESSMENT ADULT - SWALLOW EVAL: DIAGNOSIS
Pt p/w s&s of oropharyngeal dysphagia c/b poor oral aperture, poor oral grading, absent A-P movement of bolus, absent swallow trigger and absent hyolaryngeal elevation upon palpation. Puree PO trial suctioned out by SLP. +High aspiration risk.

## 2023-03-13 NOTE — CONSULT NOTE ADULT - SUBJECTIVE AND OBJECTIVE BOX
PULMONARY SERVICE INITIAL CONSULT NOTE    HPI:  This is a  89 yo F from home, lives with , bedbound with 24hr HHA PMHx of Parkinson's disease, dementia, HTN, HLD presenting to the ED for worsening mental status.  and daughter at bedside providing collateral, state that patient's mental status appears to be worse and she hasn't been eating much,   noticed that today she was bringing up phlegm and she appeared to have difficulty swallowing. Family deny that she has had a cough. They deny any sick contacts, and she hasn't had any fevers or chills. (12 Mar 2023 16:06)      REVIEW OF SYSTEMS:  Constitutional: No fever, weight loss or fatigue  Eyes: No eye pain, visual disturbances, or discharge  ENMT:  No difficulty hearing, tinnitus, vertigo; No sinus or throat pain  Neck: No pain, stiffness or neck swelling  Respiratory: see HPI  Cardiovascular: No chest pain, palpitations, dizziness or leg swelling  Gastrointestinal: No abdominal or epigastric pain. No nausea, vomiting or hematemesis; No diarrhea or constipation. No melena or hematochezia.  Genitourinary: No dysuria, frequency, hematuria or incontinence  Neurological: No headaches, memory loss, loss of strength, numbness or tremors  Skin: No itching, burning, rashes or lesions   Lymph Nodes: No enlarged glands  Endocrine: No heat or cold intolerance; No hair loss  Musculoskeletal: No joint pain or swelling; No muscle, back or extremity pain  Psychiatric: No depression, anxiety, mood swings or difficulty sleeping  Heme/Lymph: No easy bruising or bleeding gums  Allergy and Immunologic: No hives or eczema    PAST MEDICAL & SURGICAL HISTORY:  HTN (hypertension)      HLD (hyperlipidemia)      Parkinson&#x27;s disease      Dementia      Breast cancer      Chronic constipation      Dysphagia      H/O lumpectomy          FAMILY HISTORY:      SOCIAL HISTORY:  Smoking Status: [ ] Current, [ ] Former, [ ] Never  Pack Years:    MEDICATIONS:  Pulmonary:  albuterol/ipratropium for Nebulization 3 milliLiter(s) Nebulizer every 6 hours    Antimicrobials:  azithromycin  IVPB 500 milliGRAM(s) IV Intermittent every 24 hours  cefTRIAXone   IVPB 1000 milliGRAM(s) IV Intermittent every 24 hours    Anticoagulants:  enoxaparin Injectable 40 milliGRAM(s) SubCutaneous every 24 hours    Onc:    GI/:  bisacodyl 5 milliGRAM(s) Oral every 12 hours PRN    Endocrine:  insulin lispro (ADMELOG) corrective regimen sliding scale   SubCutaneous every 6 hours    Cardiac:    Other Medications:  carbidopa/levodopa 25/100 Disintegrating Tablet 1 Tablet(s) Oral <User Schedule>  lactated ringers. 1000 milliLiter(s) IV Continuous <Continuous>      Allergies    No Known Allergies    Intolerances        Vital Signs Last 24 Hrs  T(C): 36.8 (13 Mar 2023 05:24), Max: 37.3 (12 Mar 2023 10:34)  T(F): 98.2 (13 Mar 2023 05:24), Max: 99.1 (12 Mar 2023 10:34)  HR: 89 (13 Mar 2023 05:24) (75 - 109)  BP: 128/80 (13 Mar 2023 05:24) (112/70 - 153/91)  BP(mean): --  RR: 17 (13 Mar 2023 05:24) (16 - 19)  SpO2: 94% (13 Mar 2023 05:24) (94% - 100%)    Parameters below as of 13 Mar 2023 05:24  Patient On (Oxygen Delivery Method): nasal cannula  O2 Flow (L/min): 3          PHYSICAL EXAM:  GEN: NAD, well-appearing, comfortable upright/ semirecumbent in bed  HEENT: anicteric sclera; MMM  RESP: no respiratory distress, CTA B/L; no W/R/R  CV: +S1/S2, RRR, no m/g/r  GI: soft, NT/ND, +BS  EXTREM: WWP; no edema, clubbing or cyanosis  Vascular: 2+ radial pulses B/L  NEURO: alert and awake, moving limbs spontaneously    LABS:      CBC Full  -  ( 12 Mar 2023 11:09 )  WBC Count : 15.07 K/uL  RBC Count : 4.76 M/uL  Hemoglobin : 14.2 g/dL  Hematocrit : 42.9 %  Platelet Count - Automated : 183 K/uL  Mean Cell Volume : 90.1 fl  Mean Cell Hemoglobin : 29.8 pg  Mean Cell Hemoglobin Concentration : 33.1 gm/dL  Auto Neutrophil # : 13.63 K/uL  Auto Lymphocyte # : 0.32 K/uL  Auto Monocyte # : 0.95 K/uL  Auto Eosinophil # : 0.03 K/uL  Auto Basophil # : 0.04 K/uL  Auto Neutrophil % : 90.4 %  Auto Lymphocyte % : 2.1 %  Auto Monocyte % : 6.3 %  Auto Eosinophil % : 0.2 %  Auto Basophil % : 0.3 %        141  |  110<H>  |  23<H>  ----------------------------<  191<H>  3.6   |  23  |  0.65    Ca    8.9      12 Mar 2023 11:09    TPro  7.6  /  Alb  3.1<L>  /  TBili  0.6  /  DBili  x   /  AST  12  /  ALT  12  /  AlkPhos  106  -12    PT/INR - ( 12 Mar 2023 11:09 )   PT: 15.1 sec;   INR: 1.27 ratio         PTT - ( 12 Mar 2023 11:09 )  PTT:28.2 sec      Urinalysis Basic - ( 12 Mar 2023 11:24 )    Color: Yellow / Appearance: Clear / S.025 / pH: x  Gluc: x / Ketone: Large  / Bili: Negative / Urobili: Negative   Blood: x / Protein: 100 / Nitrite: Negative   Leuk Esterase: Negative / RBC: 10-25 /HPF / WBC 0-2 /HPF   Sq Epi: x / Non Sq Epi: Moderate /HPF / Bacteria: Few /HPF                RADIOLOGY & ADDITIONAL STUDIES:   PULMONARY SERVICE INITIAL CONSULT NOTE    HPI:  This is a  89 yo F from home, lives with , bedbound with 24hr HHA PMHx of Parkinson's disease, dementia, HTN, HLD presenting to the ED for worsening mental status.  and daughter at bedside providing collateral, state that patient's mental status appears to be worse and she hasn't been eating much,   noticed that today she was bringing up phlegm and she appeared to have difficulty swallowing. Family deny that she has had a cough. They deny any sick contacts, and she hasn't had any fevers or chills. (12 Mar 2023 16:06)      REVIEW OF SYSTEMS:  Constitutional: No fever, weight loss or fatigue  Eyes: No eye pain, visual disturbances, or discharge  ENMT:  No difficulty hearing, tinnitus, vertigo; No sinus or throat pain  Neck: No pain, stiffness or neck swelling  Respiratory: see HPI  Cardiovascular: No chest pain, palpitations, dizziness or leg swelling  Gastrointestinal: No abdominal or epigastric pain. No nausea, vomiting or hematemesis; No diarrhea or constipation. No melena or hematochezia.  Genitourinary: No dysuria, frequency, hematuria or incontinence  Neurological: No headaches, memory loss, loss of strength, numbness or tremors  Skin: No itching, burning, rashes or lesions   Lymph Nodes: No enlarged glands  Endocrine: No heat or cold intolerance; No hair loss  Musculoskeletal: No joint pain or swelling; No muscle, back or extremity pain  Psychiatric: No depression, anxiety, mood swings or difficulty sleeping  Heme/Lymph: No easy bruising or bleeding gums  Allergy and Immunologic: No hives or eczema    PAST MEDICAL & SURGICAL HISTORY:  HTN (hypertension)      HLD (hyperlipidemia)      Parkinson&#x27;s disease      Dementia      Breast cancer      Chronic constipation      Dysphagia      H/O lumpectomy          FAMILY HISTORY:      SOCIAL HISTORY:  Smoking Status: [ ] Current, [ ] Former, [ ] Never  Pack Years:    MEDICATIONS:  Pulmonary:  albuterol/ipratropium for Nebulization 3 milliLiter(s) Nebulizer every 6 hours    Antimicrobials:  azithromycin  IVPB 500 milliGRAM(s) IV Intermittent every 24 hours  cefTRIAXone   IVPB 1000 milliGRAM(s) IV Intermittent every 24 hours    Anticoagulants:  enoxaparin Injectable 40 milliGRAM(s) SubCutaneous every 24 hours    Onc:    GI/:  bisacodyl 5 milliGRAM(s) Oral every 12 hours PRN    Endocrine:  insulin lispro (ADMELOG) corrective regimen sliding scale   SubCutaneous every 6 hours    Cardiac:    Other Medications:  carbidopa/levodopa 25/100 Disintegrating Tablet 1 Tablet(s) Oral <User Schedule>  lactated ringers. 1000 milliLiter(s) IV Continuous <Continuous>      Allergies    No Known Allergies    Intolerances        Vital Signs Last 24 Hrs  T(C): 36.8 (13 Mar 2023 05:24), Max: 37.3 (12 Mar 2023 10:34)  T(F): 98.2 (13 Mar 2023 05:24), Max: 99.1 (12 Mar 2023 10:34)  HR: 89 (13 Mar 2023 05:24) (75 - 109)  BP: 128/80 (13 Mar 2023 05:24) (112/70 - 153/91)  BP(mean): --  RR: 17 (13 Mar 2023 05:24) (16 - 19)  SpO2: 94% (13 Mar 2023 05:24) (94% - 100%)    Parameters below as of 13 Mar 2023 05:24  Patient On (Oxygen Delivery Method): nasal cannula  O2 Flow (L/min): 3          PHYSICAL EXAM:  GEN: NAD, well-appearing, comfortable upright/ semirecumbent in bed  HEENT: anicteric sclera; MMM  RESP: no respiratory distress, CTA B/L; no W/R/R  CV: +S1/S2, RRR, no m/g/r  GI: soft, NT/ND, +BS  EXTREM: WWP; no edema, clubbing or cyanosis  Vascular: 2+ radial pulses B/L  NEURO: alert and awake, moving limbs spontaneously    LABS:      CBC Full  -  ( 12 Mar 2023 11:09 )  WBC Count : 15.07 K/uL  RBC Count : 4.76 M/uL  Hemoglobin : 14.2 g/dL  Hematocrit : 42.9 %  Platelet Count - Automated : 183 K/uL  Mean Cell Volume : 90.1 fl  Mean Cell Hemoglobin : 29.8 pg  Mean Cell Hemoglobin Concentration : 33.1 gm/dL  Auto Neutrophil # : 13.63 K/uL  Auto Lymphocyte # : 0.32 K/uL  Auto Monocyte # : 0.95 K/uL  Auto Eosinophil # : 0.03 K/uL  Auto Basophil # : 0.04 K/uL  Auto Neutrophil % : 90.4 %  Auto Lymphocyte % : 2.1 %  Auto Monocyte % : 6.3 %  Auto Eosinophil % : 0.2 %  Auto Basophil % : 0.3 %        141  |  110<H>  |  23<H>  ----------------------------<  191<H>  3.6   |  23  |  0.65    Ca    8.9      12 Mar 2023 11:09    TPro  7.6  /  Alb  3.1<L>  /  TBili  0.6  /  DBili  x   /  AST  12  /  ALT  12  /  AlkPhos  106  12    PT/INR - ( 12 Mar 2023 11:09 )   PT: 15.1 sec;   INR: 1.27 ratio         PTT - ( 12 Mar 2023 11:09 )  PTT:28.2 sec      Urinalysis Basic - ( 12 Mar 2023 11:24 )    Color: Yellow / Appearance: Clear / S.025 / pH: x  Gluc: x / Ketone: Large  / Bili: Negative / Urobili: Negative   Blood: x / Protein: 100 / Nitrite: Negative   Leuk Esterase: Negative / RBC: 10-25 /HPF / WBC 0-2 /HPF   Sq Epi: x / Non Sq Epi: Moderate /HPF / Bacteria: Few /HPF                RADIOLOGY & ADDITIONAL STUDIES:  < from: CT Angio Chest PE Protocol w/ IV Cont (23 @ 14:50) >  PULMONARY EMBOLISM: No pulmonary embolism..  AIRWAYS AND LUNGS: Tracheal narrowing suggests tracheobronchomalacia.    Tracheobronchial secretions. Scattered linear atelectasis/scarring.  MEDIASTINUM AND PLEURA: There are no enlarged mediastinal, hilar or   axillary lymph nodes. Thevisualized portion of the thyroid gland is   heterogeneous. There is no pleural effusion. There is no pneumothorax.  HEART AND VESSELS: The heart is normal in size.   There are   atherosclerotic calcifications of the aorta and coronary arteries.  There   is no pericardial effusion.  UPPER ABDOMEN: Images of the upper abdomen demonstrate 2 cm left renal   cyst measures just above fluid density..  BONES AND SOFT TISSUES: The bones are unremarkable.  The soft tissues are   unremarkable.  IMPRESSION:  No pulmonary embolism..  Tracheal narrowing suggests tracheobronchomalacia.  Tracheobronchial   secretions. Scattered linear atelectasis/scarring.    2 cm left renal cyst measures just above fluid density. Ultrasound needed   for complete evaluation. PULMONARY SERVICE INITIAL CONSULT NOTE    HPI:  88F never smoker, bedbound with 24hr HHA with PMH Parkinson's disease, dementia, HTN, HLD presenting to the ED for worsening mental status. Per documentation  and daughter reporting worsening AMS and anorexia for past few days.  noticed on day of admission she was bringing up phlegm and had some dysphagia. Family denied that she has had a cough. They deny any sick contacts, and she hasn't had any fevers or chills. No known existing lung dz. In ED hemodynamically stable, afebrile, required 3LNC. CTA chest done demonstrating no PE, narrowed trachea with tracheobronchial secretions and areas of linear atelectasis and scarring. No focal consolidations/airspace opacities. RVP +entero/rhinovirus. Pulmonary consulted for acute hypoxemic resp failure. Pt never intubated/trach'd in the past, no known h/o frequent infx or chronic bronchitis. Full ROS could not be obtained, limited by pt mentation.    REVIEW OF SYSTEMS:  Full ROS could not be obtained, limited by pt mentation.    PAST MEDICAL & SURGICAL HISTORY:  HTN (hypertension)      HLD (hyperlipidemia)      Parkinson&#x27;s disease      Dementia      Breast cancer      Chronic constipation      Dysphagia      H/O lumpectomy          FAMILY HISTORY:  No significant family hx    SOCIAL HISTORY:  Smoking Status: [ ] Current, [ ] Former, [x] Never  Pack Years:    MEDICATIONS:  Pulmonary:  albuterol/ipratropium for Nebulization 3 milliLiter(s) Nebulizer every 6 hours    Antimicrobials:  azithromycin  IVPB 500 milliGRAM(s) IV Intermittent every 24 hours  cefTRIAXone   IVPB 1000 milliGRAM(s) IV Intermittent every 24 hours    Anticoagulants:  enoxaparin Injectable 40 milliGRAM(s) SubCutaneous every 24 hours    Onc:    GI/:  bisacodyl 5 milliGRAM(s) Oral every 12 hours PRN    Endocrine:  insulin lispro (ADMELOG) corrective regimen sliding scale   SubCutaneous every 6 hours    Cardiac:    Other Medications:  carbidopa/levodopa 25/100 Disintegrating Tablet 1 Tablet(s) Oral <User Schedule>  lactated ringers. 1000 milliLiter(s) IV Continuous <Continuous>      Allergies    No Known Allergies    Intolerances        Vital Signs Last 24 Hrs  T(C): 36.8 (13 Mar 2023 05:24), Max: 37.3 (12 Mar 2023 10:34)  T(F): 98.2 (13 Mar 2023 05:24), Max: 99.1 (12 Mar 2023 10:34)  HR: 89 (13 Mar 2023 05:24) (75 - 109)  BP: 128/80 (13 Mar 2023 05:24) (112/70 - 153/91)  BP(mean): --  RR: 17 (13 Mar 2023 05:24) (16 - 19)  SpO2: 94% (13 Mar 2023 05:24) (94% - 100%)    Parameters below as of 13 Mar 2023 05:24  Patient On (Oxygen Delivery Method): nasal cannula  O2 Flow (L/min): 3          PHYSICAL EXAM:  GEN: NAD, chronically frail-appearing, comfortable semirecumbent in bed  HEENT: anicteric sclera; MMM  RESP: no respiratory distress, diffuse rhonchi throughout, no stridor or wheeze appreciated  CV: +S1/S2, RRR, no m/g/r  GI: soft, NT/ND, +BS  EXTREM: WWP; no clubbing or cyanosis, pretibial edema  Vascular: 2+ radial pulses B/L  NEURO: nonverbal at baseline, moving limbs    LABS:      CBC Full  -  ( 12 Mar 2023 11:09 )  WBC Count : 15.07 K/uL  RBC Count : 4.76 M/uL  Hemoglobin : 14.2 g/dL  Hematocrit : 42.9 %  Platelet Count - Automated : 183 K/uL  Mean Cell Volume : 90.1 fl  Mean Cell Hemoglobin : 29.8 pg  Mean Cell Hemoglobin Concentration : 33.1 gm/dL  Auto Neutrophil # : 13.63 K/uL  Auto Lymphocyte # : 0.32 K/uL  Auto Monocyte # : 0.95 K/uL  Auto Eosinophil # : 0.03 K/uL  Auto Basophil # : 0.04 K/uL  Auto Neutrophil % : 90.4 %  Auto Lymphocyte % : 2.1 %  Auto Monocyte % : 6.3 %  Auto Eosinophil % : 0.2 %  Auto Basophil % : 0.3 %    03-12    141  |  110<H>  |  23<H>  ----------------------------<  191<H>  3.6   |  23  |  0.65    Ca    8.9      12 Mar 2023 11:09    TPro  7.6  /  Alb  3.1<L>  /  TBili  0.6  /  DBili  x   /  AST  12  /  ALT  12  /  AlkPhos  106  03-12    PT/INR - ( 12 Mar 2023 11:09 )   PT: 15.1 sec;   INR: 1.27 ratio         PTT - ( 12 Mar 2023 11:09 )  PTT:28.2 sec      Urinalysis Basic - ( 12 Mar 2023 11:24 )    Color: Yellow / Appearance: Clear / S.025 / pH: x  Gluc: x / Ketone: Large  / Bili: Negative / Urobili: Negative   Blood: x / Protein: 100 / Nitrite: Negative   Leuk Esterase: Negative / RBC: 10-25 /HPF / WBC 0-2 /HPF   Sq Epi: x / Non Sq Epi: Moderate /HPF / Bacteria: Few /HPF                RADIOLOGY & ADDITIONAL STUDIES:  < from: CT Angio Chest PE Protocol w/ IV Cont (23 @ 14:50) >  PULMONARY EMBOLISM: No pulmonary embolism..  AIRWAYS AND LUNGS: Tracheal narrowing suggests tracheobronchomalacia.    Tracheobronchial secretions. Scattered linear atelectasis/scarring.  MEDIASTINUM AND PLEURA: There are no enlarged mediastinal, hilar or   axillary lymph nodes. Thevisualized portion of the thyroid gland is   heterogeneous. There is no pleural effusion. There is no pneumothorax.  HEART AND VESSELS: The heart is normal in size.   There are   atherosclerotic calcifications of the aorta and coronary arteries.  There   is no pericardial effusion.  UPPER ABDOMEN: Images of the upper abdomen demonstrate 2 cm left renal   cyst measures just above fluid density..  BONES AND SOFT TISSUES: The bones are unremarkable.  The soft tissues are   unremarkable.  IMPRESSION:  No pulmonary embolism..  Tracheal narrowing suggests tracheobronchomalacia.  Tracheobronchial   secretions. Scattered linear atelectasis/scarring.    2 cm left renal cyst measures just above fluid density. Ultrasound needed   for complete evaluation.

## 2023-03-13 NOTE — CONSULT NOTE ADULT - ASSESSMENT
< from: CT Angio Chest PE Protocol w/ IV Cont (03.12.23 @ 14:50) >  PULMONARY EMBOLISM: No pulmonary embolism..  AIRWAYS AND LUNGS: Tracheal narrowing suggests tracheobronchomalacia.    Tracheobronchial secretions. Scattered linear atelectasis/scarring.  MEDIASTINUM AND PLEURA: There are no enlarged mediastinal, hilar or   axillary lymph nodes. Thevisualized portion of the thyroid gland is   heterogeneous. There is no pleural effusion. There is no pneumothorax.  HEART AND VESSELS: The heart is normal in size.   There are   atherosclerotic calcifications of the aorta and coronary arteries.  There   is no pericardial effusion.  UPPER ABDOMEN: Images of the upper abdomen demonstrate 2 cm left renal   cyst measures just above fluid density..  BONES AND SOFT TISSUES: The bones are unremarkable.  The soft tissues are   unremarkable.  IMPRESSION:  No pulmonary embolism..  Tracheal narrowing suggests tracheobronchomalacia.  Tracheobronchial   secretions. Scattered linear atelectasis/scarring.    2 cm left renal cyst measures just above fluid density. Ultrasound needed   for complete evaluation.    < end of copied text >   88F never smoker, bedbound and nonverbal at baseline, h/o PD, dementia, HTN, HLD p/w worsening AMS, found +entero/rhinovirus. CT notable for markedly narrowed airways, traceobronchomalacia vs. dynamic airway collapse, with secretion impaction. Sx likely secondary to acute entero/rhinovirus, no evidence of pna on chest imaging and without fever; leukocytosis now normalized.        Recommendations:  - C/w duonebs  - Given no evidence of superimposed pna, unclear role for abx; can f/u peripheral ID w/u and c/w azithromycin to complete 5d course  - chest PT TID  - Start 3% hypertonic saline nebs TID  - Titrate supplemental O2 with goal SpO2 92-95%; monitor ambulatory/exertional SpO2 and document  - Would recommend GOC discussions, palliative care c/s  - Will continue to follow      Franny Sotomayor MD  Pulmonary & Critical Care  Available on Teams

## 2023-03-13 NOTE — SWALLOW BEDSIDE ASSESSMENT ADULT - SLP PERTINENT HISTORY OF CURRENT PROBLEM
89 yo F from home, lives with , bedbound with 24hr HHA PMHx of Parkinson's disease, dementia, HTN, HLD presenting to the ED for worsening mental status. As per H&P,  and daughter at bedside provided collateral, stating that patient's mental status appears to be worse and she hasn't been eating much,   noticed she was bringing up phlegm and appeared to have difficulty swallowing. CTA shows tracheal narrowing suggests tracheobronchomalacia.

## 2023-03-13 NOTE — SWALLOW BEDSIDE ASSESSMENT ADULT - ADDITIONAL RECOMMENDATIONS
1) Consider Palliative Care consult to determine the GOC with re: to provision of nutrition in this patient.   2) May consider alternative means of nutrition/hydration if in alignment with pt's/HCP's goals of care.

## 2023-03-13 NOTE — SWALLOW BEDSIDE ASSESSMENT ADULT - COMMENTS
Puree PO trial suctioned out by SLP. Exam terminated. HOB elevated to 90°. AA+Ox0; pt nonverbal, unable to follow directions or answer SLP queries in [place]e. Pt lethargic but intermittently opened eyes. Baseline wet upper respiratory sounds and weak cough. SLP suctioned pooling of secretions prior to PO trials. One PO trial attempted due to pt appearing somnolent.

## 2023-03-13 NOTE — PROGRESS NOTE ADULT - SUBJECTIVE AND OBJECTIVE BOX
PGY-1 Progress Note discussed with attending    PAGER #: [[phone]] TILL 5:00 PM  PLEASE CONTACT ON CALL TEAM:  - On Call Team (Please refer to Alireza) FROM 5:00 PM - 8:30PM  - Nightfloat Team FROM 8:30 -7:30 AM    CC: Patient is a 88y old  Female who presents with a chief complaint of AHRF (13 Mar 2023 09:47)      OVERNIGHT EVENTS: none    SUBJECTIVE / INTERVAL HPI: Patient seen and examined at bedside. Patient is non-verbal, minimally responsive. Does follow simple command when asked to open mouth during exam. Appears lethargic but in no acute distress.     ROS: Unable to obtain full ROS due to mental status    VITAL SIGNS:  Vital Signs Last 24 Hrs  T(C): 37.9 (13 Mar 2023 13:49), Max: 37.9 (13 Mar 2023 13:49)  T(F): 100.2 (13 Mar 2023 13:49), Max: 100.2 (13 Mar 2023 13:49)  HR: 107 (13 Mar 2023 13:49) (75 - 107)  BP: 131/51 (13 Mar 2023 13:49) (112/70 - 153/91)  BP(mean): --  RR: 18 (13 Mar 2023 13:49) (16 - 19)  SpO2: 95% (13 Mar 2023 13:49) (94% - 100%)    Parameters below as of 13 Mar 2023 13:49  Patient On (Oxygen Delivery Method): nasal cannula  O2 Flow (L/min): 3      PHYSICAL EXAM:    General: lethargic, non-verbal, NAD  HEENT: NC/AT; PERRL, anicteric sclera; dry mucous membranes  Neck: supple  Cardiovascular: +S1/S2; RRR  Respiratory: CTA B/L; no W/R/R  Gastrointestinal: soft, NT/ND; +BSx4  Extremities: WWP; no edema, clubbing or cyanosis  Vascular: 2+ radial, DP/PT pulses B/L  Skin: Warm, dry, good turgor, no rashes, or ecchymoses  Neurological: AAOx0; unable to assess fully    MEDICATIONS:  MEDICATIONS  (STANDING):  albuterol/ipratropium for Nebulization 3 milliLiter(s) Nebulizer every 6 hours  azithromycin  IVPB 500 milliGRAM(s) IV Intermittent every 24 hours  carbidopa/levodopa 25/100 Disintegrating Tablet 1 Tablet(s) Oral <User Schedule>  cefTRIAXone   IVPB 1000 milliGRAM(s) IV Intermittent every 24 hours  enoxaparin Injectable 40 milliGRAM(s) SubCutaneous every 24 hours  insulin lispro (ADMELOG) corrective regimen sliding scale   SubCutaneous every 6 hours  lactated ringers. 1000 milliLiter(s) (50 mL/Hr) IV Continuous <Continuous>  potassium phosphate IVPB 30 milliMole(s) IV Intermittent once    MEDICATIONS  (PRN):  bisacodyl 5 milliGRAM(s) Oral every 12 hours PRN Constipation      ALLERGIES:  Allergies    No Known Allergies    Intolerances        LABS:                        11.0   9.28  )-----------( 147      ( 13 Mar 2023 08:40 )             33.5     03-13    144  |  111<H>  |  15  ----------------------------<  90  3.0<L>   |  26  |  0.45<L>    Ca    8.2<L>      13 Mar 2023 08:40  Phos  2.0     03-13  Mg     2.0     03-13    TPro  6.0  /  Alb  2.4<L>  /  TBili  0.4  /  DBili  x   /  AST  10  /  ALT  7<L>  /  AlkPhos  81  03-13    PT/INR - ( 12 Mar 2023 11:09 )   PT: 15.1 sec;   INR: 1.27 ratio         PTT - ( 12 Mar 2023 11:09 )  PTT:28.2 sec  Urinalysis Basic - ( 12 Mar 2023 11:24 )    Color: Yellow / Appearance: Clear / S.025 / pH: x  Gluc: x / Ketone: Large  / Bili: Negative / Urobili: Negative   Blood: x / Protein: 100 / Nitrite: Negative   Leuk Esterase: Negative / RBC: 10-25 /HPF / WBC 0-2 /HPF   Sq Epi: x / Non Sq Epi: Moderate /HPF / Bacteria: Few /HPF      CAPILLARY BLOOD GLUCOSE      POCT Blood Glucose.: 105 mg/dL (13 Mar 2023 11:29)      RADIOLOGY & ADDITIONAL TESTS: Reviewed.

## 2023-03-14 NOTE — PROGRESS NOTE ADULT - ASSESSMENT
88F never smoker, bedbound and nonverbal at baseline, h/o PD, dementia, HTN, HLD p/w worsening AMS, found +entero/rhinovirus. CT notable for markedly narrowed airways, traceobronchomalacia vs. dynamic airway collapse, with secretion impaction. Sx likely secondary to acute entero/rhinovirus, no evidence of pna on chest imaging and without fever; leukocytosis now normalized.        Recommendations:  - C/w duonebs  - Given no evidence of superimposed pna, unclear role for abx; can f/u peripheral ID w/u and c/w empiric azithromycin to complete 5d course  - chest PT TID  - Start 3% hypertonic saline nebs TID  - Pt is NPO; if able to take PO meds can consider trial of 600-1200mg [id number]-[id number] standing BID for mucolytic effect   - Titrate supplemental O2 with goal SpO2 92-95%; monitor ambulatory/exertional SpO2 and document  - Would recommend GOC discussions, palliative care c/s  - Aspiration precautions; mentation limiting participation in SLP eval  - Will continue to follow      Franny Sotomayor MD  Pulmonary & Critical Care  Available on Teams

## 2023-03-14 NOTE — CONSULT NOTE ADULT - SUBJECTIVE AND OBJECTIVE BOX
[facility] Geriatric and Palliative Consult Service:  Rose Cash DO: cell ([phone])  Serg Williamson NP: cell ([phone])   Elisa Brice DNP: cell ([phone])  Oracio Fitzgerald LMSW: cell ([phone])   Barbara Willis NP: via [employer] Teams    Can contact any Palliative Team member via [employer] Teams for consults and questions      HPI:  This is a  87 yo F from home, lives with , bedbound with 24hr HHA PMHx of Parkinson's disease, dementia, HTN, HLD presenting to the ED for worsening mental status.  and daughter at bedside providing collateral, state that patient's mental status appears to be worse and she hasn't been eating much,   noticed that today she was bringing up phlegm and she appeared to have difficulty swallowing. Family deny that she has had a cough. They deny any sick contacts, and she hasn't had any fevers or chills. (12 Mar 2023 16:06)    Interval hx:    PAST MEDICAL & SURGICAL HISTORY:  HTN (hypertension)      HLD (hyperlipidemia)      Parkinson&#x27;s disease      Dementia      Breast cancer      Chronic constipation      Dysphagia      H/O lumpectomy          SOCIAL HISTORY:    Admitted from:  home  (with HHA)           assisted living          [facility]       LTC   [ none ] Substance abuse, [ none ] Tobacco hx, [ none ] Alcohol hx, [ none ] Home Opioid Hx    FAMILY HISTORY:   unable to obtain from patient due to poor mentation, family unable to give information, see H&P for history  Baseline ADLs (prior to admission):    Allergies    No Known Allergies    Intolerances      Present Symptoms: Mild, Moderate, Severe  Pain:             Location -                               Aggravating factors -             Quality -             Radiation -             Timing-             Severity (0-10 scale):             Minimal acceptable level (0-10 scale):  Fatigue:  Nausea:  Lack of Appetite:   SOB:  Depression:  Anxiety:  Review of Systems: [All others negative or Unable to obtain due to poor mentation]    CPOT:    https://www.sccm.org/getattachment/[email or web address]-[email or web address]/Critical-Care-Pain-Observation-Tool-(CPOT)  PAIN AD Score:   [email or web address] (press ctrl +  left click to view)      MEDICATIONS  (STANDING):  albuterol/ipratropium for Nebulization 3 milliLiter(s) Nebulizer every 6 hours  carbidopa/levodopa 25/100 Disintegrating Tablet 1 Tablet(s) Oral <User Schedule>  enoxaparin Injectable 40 milliGRAM(s) SubCutaneous every 24 hours  insulin lispro (ADMELOG) corrective regimen sliding scale   SubCutaneous every 6 hours  lactated ringers. 1000 milliLiter(s) (50 mL/Hr) IV Continuous <Continuous>  sodium chloride 3%  Inhalation 4 milliLiter(s) Inhalation every 12 hours    MEDICATIONS  (PRN):  bisacodyl 5 milliGRAM(s) Oral every 12 hours PRN Constipation  glycopyrrolate Injectable 0.4 milliGRAM(s) IV Push every 6 hours PRN secretions  LORazepam   Injectable 1 milliGRAM(s) IV Push every 4 hours PRN Agitation      PHYSICAL EXAM:  Vital Signs Last 24 Hrs  T(C): 37.8 (14 Mar 2023 13:51), Max: 37.8 (14 Mar 2023 13:51)  T(F): 100 (14 Mar 2023 13:51), Max: 100 (14 Mar 2023 13:51)  HR: 107 (14 Mar 2023 13:51) (104 - 107)  BP: 145/88 (14 Mar 2023 13:51) (121/81 - 145/88)  BP(mean): --  RR: 18 (14 Mar 2023 13:51) (18 - 18)  SpO2: 95% (14 Mar 2023 13:51) (95% - 97%)    Parameters below as of 14 Mar 2023 13:51  Patient On (Oxygen Delivery Method): nasal cannula  O2 Flow (L/min): 3      General: alert  oriented x ____    lethargic distressed cachexia  nonverbal  unarousable verbal    Palliative Performance Scale/Karnofsky Score:  http://npcrc.org/files/news/palliative_performance_scale_ppsv2.pdf    HEENT: no abnormal lesion, dry mouth  ET tube/trach oral lesions:  Lungs: tachypnea/labored breathing, audible excessive secretions  CV: RRR, S1S2, tachycardia  GI: soft non distended non tender  incontinent               PEG/NG/OG tube  constipation  last BM:   : incontinent  oliguria/anuria  eugene  Musculoskeletal: weakness x4 edema x4    ambulatory with assistance   mostly/fully bedbound/wheelchair bound  Skin: no abnormal skin lesions, poor skin turgor, pressure ulcer stage:   Neuro: no deficits, mild cognitive impairment dsyphagia/dysarthria paresis  Oral intake ability: unable/only mouth care, minimal moderate full capability    LABS:                        10.7   7.86  )-----------( 155      ( 14 Mar 2023 06:00 )             31.4     03-14    139  |  108  |  9   ----------------------------<  90  3.4<L>   |  24  |  0.33<L>    Ca    8.0<L>      14 Mar 2023 06:00  Phos  2.5     03-14  Mg     1.9     03-14    TPro  6.0  /  Alb  2.2<L>  /  TBili  0.4  /  DBili  x   /  AST  11  /  ALT  8<L>  /  AlkPhos  78  03-14        RADIOLOGY & ADDITIONAL STUDIES:         [facility] Geriatric and Palliative Consult Service:  Rose Cash DO: cell ([phone])  Serg Williamson NP: cell ([phone])   Elisa Brice DNP: cell ([phone])  Oracio Fitzgerald LMSW: cell ([phone])   Barbara Willis NP: via [employer] Teams    Can contact any Palliative Team member via [employer] Teams for consults and questions      HPI:  This is a  89 yo F from home, lives with , bedbound with 24hr HHA PMHx of Parkinson's disease, dementia, HTN, HLD presenting to the ED for worsening mental status.  and daughter at bedside providing collateral, state that patient's mental status appears to be worse and she hasn't been eating much,   noticed that today she was bringing up phlegm and she appeared to have difficulty swallowing. Family deny that she has had a cough. They deny any sick contacts, and she hasn't had any fevers or chills. (12 Mar 2023 16:06)    Interval hx:  Pt lethargic, Aox0, non verbal.  Noted w audible secretions.  Family at the bedside  PAST MEDICAL & SURGICAL HISTORY:  HTN (hypertension)      HLD (hyperlipidemia)      Parkinson&#x27;s disease      Dementia      Breast cancer      Chronic constipation      Dysphagia      H/O lumpectomy          SOCIAL HISTORY:    Admitted from:  home with spouse, has 24/7 HHA  Pt is  has 4 children, her daughter Karen Gardner is her HCP  [ none ] Substance abuse, [ none ] Tobacco hx, [ none ] Alcohol hx, [ none ] Home Opioid Hx    Shinto: Tenriism    Kendra Jones  (HCP)  Phone# [phone]    FAMILY HISTORY:   unable to obtain from patient due to poor mentation, family unable to give information, see H&P for history  Baseline ADLs (prior to admission): bedbound, total care    Allergies    No Known Allergies    Intolerances      Present Symptoms: Mild, Moderate, Severe   Unable to obtain due to poor mentation    CPOT:    https://www.sccm.org/getattachment/[email or web address]-[email or web address]-6676j0047u5s/Critical-Care-Pain-Observation-Tool-(CPOT)  PAIN AD Score:   [email or web address].pdf (press ctrl +  left click to view)      MEDICATIONS  (STANDING):  albuterol/ipratropium for Nebulization 3 milliLiter(s) Nebulizer every 6 hours  carbidopa/levodopa 25/100 Disintegrating Tablet 1 Tablet(s) Oral <User Schedule>  enoxaparin Injectable 40 milliGRAM(s) SubCutaneous every 24 hours  insulin lispro (ADMELOG) corrective regimen sliding scale   SubCutaneous every 6 hours  lactated ringers. 1000 milliLiter(s) (50 mL/Hr) IV Continuous <Continuous>  sodium chloride 3%  Inhalation 4 milliLiter(s) Inhalation every 12 hours    MEDICATIONS  (PRN):  bisacodyl 5 milliGRAM(s) Oral every 12 hours PRN Constipation  glycopyrrolate Injectable 0.4 milliGRAM(s) IV Push every 6 hours PRN secretions  LORazepam   Injectable 1 milliGRAM(s) IV Push every 4 hours PRN Agitation      PHYSICAL EXAM:  Vital Signs Last 24 Hrs  T(C): 37.8 (14 Mar 2023 13:51), Max: 37.8 (14 Mar 2023 13:51)  T(F): 100 (14 Mar 2023 13:51), Max: 100 (14 Mar 2023 13:51)  HR: 107 (14 Mar 2023 13:51) (104 - 107)  BP: 145/88 (14 Mar 2023 13:51) (121/81 - 145/88)  BP(mean): --  RR: 18 (14 Mar 2023 13:51) (18 - 18)  SpO2: 95% (14 Mar 2023 13:51) (95% - 97%)    Parameters below as of 14 Mar 2023 13:51  Patient On (Oxygen Delivery Method): nasal cannula  O2 Flow (L/min): 3      Palliative Performance Scale/Karnofsky Score: 20%  http://npcrc.org/files/news/palliative_performance_scale_ppsv2.pdf    General: obese, lethargic, AOX0 non verbal.      HEENT: no abnormal lesion, dry mouth  Lungs: unlabored on NC., +audible excessive secretions  CV: RRR, S1S2  GI: soft non distended non tender  incontinent  : incontinent    Musculoskeletal: weakness x4 , mostly/fully bedbound/wheelchair bound  Skin: no abnormal skin lesions, poor skin turgor, pressure ulcer stage:   Neuro: unable to follow commands   Oral intake ability: unable/only mouth care    LABS:                        10.7   7.86  )-----------( 155      ( 14 Mar 2023 06:00 )             31.4     03-14    139  |  108  |  9   ----------------------------<  90  3.4<L>   |  24  |  0.33<L>    Ca    8.0<L>      14 Mar 2023 06:00  Phos  2.5     03-14  Mg     1.9     03-14    TPro  6.0  /  Alb  2.2<L>  /  TBili  0.4  /  DBili  x   /  AST  11  /  ALT  8<L>  /  AlkPhos  78  03-14    < from: CT Angio Chest PE Protocol w/ IV Cont (03.12.23 @ 14:50) >  ACC: [id number] EXAM:  CT ANGIO CHEST PULM ART WAWIC   ORDERED BY: MADALYN FLOR     PROCEDURE DATE:  03/12/2023          INTERPRETATION:  EXAMINATION: CT ANGIO CHEST PULMONARY ARTERY WITHOUT AND   WITH IC    CLINICAL INDICATION: PE vs PNA, tachycardic, hypoxic    TECHNIQUE: CTA of the chest was performed after the administration of 60   cc administered of Omnipaque 350, 40 cc discarded.  MIP images were   reconstructed.    COMPARISON: None.    FINDINGS:    PULMONARY EMBOLISM: No pulmonary embolism..    AIRWAYS AND LUNGS: Tracheal narrowing suggests tracheobronchomalacia.    Tracheobronchial secretions. Scattered linear atelectasis/scarring.    MEDIASTINUM AND PLEURA: There are no enlarged mediastinal, hilar or   axillary lymph nodes. Thevisualized portion of the thyroid gland is   heterogeneous. There is no pleural effusion. There is no pneumothorax.    HEART AND VESSELS: The heart is normal in size.   There are   atherosclerotic calcifications of the aorta and coronary arteries.  There   is no pericardial effusion.    UPPER ABDOMEN: Images of the upper abdomen demonstrate 2 cm left renal   cyst measures just above fluid density..    BONES AND SOFT TISSUES: The bones are unremarkable.  The soft tissues are   unremarkable.    TUBES/LINES: None.    IMPRESSION:  No pulmonary embolism..    Tracheal narrowing suggests tracheobronchomalacia.  Tracheobronchial   secretions. Scattered linear atelectasis/scarring.    2 cm left renal cyst measures just above fluid density. Ultrasound needed   for complete evaluation.    < end of copied text >      RADIOLOGY & ADDITIONAL STUDIES: reviewed  ADVANCED DIRECTIVES: MOLST; DNR/DNI/no feeding tube/comfort measures only

## 2023-03-14 NOTE — CONSULT NOTE ADULT - PROBLEM SELECTOR RECOMMENDATION 5
This is a  87 yo F from home, lives with , bedbound with 24hr HHA PMHx of Parkinson's disease, dementia, HTN, HLD presenting to the ED for worsening mental status and difficulty swallowing.  Please see discussion noted above in GOC conversation. 88 year old woman from home, lives with , bedbound with 24hr HHA PMHx of Parkinson's disease, dementia, HTN, HLD presenting to the ED for worsening mental status and difficulty swallowing.  Failed SLP.  Family agreed for home hospice w [facility].   Please see discussion noted above in GOC conversation.

## 2023-03-14 NOTE — PROGRESS NOTE ADULT - SUBJECTIVE AND OBJECTIVE BOX
PULMONARY CONSULT SERVICE FOLLOW-UP NOTE    INTERVAL HPI:  Reviewed chart and overnight events; patient seen and examined at bedside.    MEDICATIONS:  Pulmonary:  albuterol/ipratropium for Nebulization 3 milliLiter(s) Nebulizer every 6 hours  sodium chloride 3%  Inhalation 4 milliLiter(s) Inhalation every 12 hours    Antimicrobials:  azithromycin  IVPB 500 milliGRAM(s) IV Intermittent every 24 hours  cefTRIAXone   IVPB 1000 milliGRAM(s) IV Intermittent every 24 hours    Anticoagulants:  enoxaparin Injectable 40 milliGRAM(s) SubCutaneous every 24 hours    Cardiac:      Allergies    No Known Allergies    Intolerances        Vital Signs Last 24 Hrs  T(C): 36.2 (14 Mar 2023 04:53), Max: 37.9 (13 Mar 2023 13:49)  T(F): 97.2 (14 Mar 2023 04:53), Max: 100.2 (13 Mar 2023 13:49)  HR: 104 (14 Mar 2023 04:53) (104 - 107)  BP: 123/75 (14 Mar 2023 04:53) (121/81 - 131/51)  BP(mean): --  RR: 18 (14 Mar 2023 04:53) (18 - 18)  SpO2: 97% (14 Mar 2023 04:53) (95% - 97%)    Parameters below as of 14 Mar 2023 04:53  Patient On (Oxygen Delivery Method): nasal cannula  O2 Flow (L/min): 3          PHYSICAL EXAM:  GEN: NAD, chronically frail-appearing, comfortable semirecumbent in bed  HEENT: anicteric sclera; MMM  RESP: no respiratory distress, diffuse rhonchi throughout, no stridor or wheeze appreciated  CV: +S1/S2, RRR, no m/g/r  GI: soft, NT/ND, +BS  EXTREM: WWP; no clubbing or cyanosis, pretibial edema  Vascular: 2+ radial pulses B/L  NEURO: nonverbal at baseline, moving limbs    LABS:      CBC Full  -  ( 14 Mar 2023 06:00 )  WBC Count : 7.86 K/uL  RBC Count : 3.58 M/uL  Hemoglobin : 10.7 g/dL  Hematocrit : 31.4 %  Platelet Count - Automated : 155 K/uL  Mean Cell Volume : 87.7 fl  Mean Cell Hemoglobin : 29.9 pg  Mean Cell Hemoglobin Concentration : 34.1 gm/dL  Auto Neutrophil # : 5.66 K/uL  Auto Lymphocyte # : 1.19 K/uL  Auto Monocyte # : 0.70 K/uL  Auto Eosinophil # : 0.24 K/uL  Auto Basophil # : 0.03 K/uL  Auto Neutrophil % : 72.0 %  Auto Lymphocyte % : 15.1 %  Auto Monocyte % : 8.9 %  Auto Eosinophil % : 3.1 %  Auto Basophil % : 0.4 %    03-14    139  |  108  |  9   ----------------------------<  90  3.4<L>   |  24  |  0.33<L>    Ca    8.0<L>      14 Mar 2023 06:00  Phos  2.5     03-14  Mg     1.9     03-14    TPro  6.0  /  Alb  2.2<L>  /  TBili  0.4  /  DBili  x   /  AST  11  /  ALT  8<L>  /  AlkPhos  78  03-14    PT/INR - ( 12 Mar 2023 11:09 )   PT: 15.1 sec;   INR: 1.27 ratio         PTT - ( 12 Mar 2023 11:09 )  PTT:28.2 sec      Urinalysis Basic - ( 12 Mar 2023 11:24 )    Color: Yellow / Appearance: Clear / S.025 / pH: x  Gluc: x / Ketone: Large  / Bili: Negative / Urobili: Negative   Blood: x / Protein: 100 / Nitrite: Negative   Leuk Esterase: Negative / RBC: 10-25 /HPF / WBC 0-2 /HPF   Sq Epi: x / Non Sq Epi: Moderate /HPF / Bacteria: Few /HPF              RADIOLOGY & ADDITIONAL STUDIES:  No interval chest imaging for review  PULMONARY CONSULT SERVICE FOLLOW-UP NOTE    INTERVAL HPI:  Reviewed chart and overnight events; patient seen and examined at bedside. Awake this AM but not responsive to verbal query. Appears comfortable, in no respiratory distress.    MEDICATIONS:  Pulmonary:  albuterol/ipratropium for Nebulization 3 milliLiter(s) Nebulizer every 6 hours  sodium chloride 3%  Inhalation 4 milliLiter(s) Inhalation every 12 hours    Antimicrobials:  azithromycin  IVPB 500 milliGRAM(s) IV Intermittent every 24 hours  cefTRIAXone   IVPB 1000 milliGRAM(s) IV Intermittent every 24 hours    Anticoagulants:  enoxaparin Injectable 40 milliGRAM(s) SubCutaneous every 24 hours    Cardiac:      Allergies    No Known Allergies    Intolerances        Vital Signs Last 24 Hrs  T(C): 36.2 (14 Mar 2023 04:53), Max: 37.9 (13 Mar 2023 13:49)  T(F): 97.2 (14 Mar 2023 04:53), Max: 100.2 (13 Mar 2023 13:49)  HR: 104 (14 Mar 2023 04:53) (104 - 107)  BP: 123/75 (14 Mar 2023 04:53) (121/81 - 131/51)  BP(mean): --  RR: 18 (14 Mar 2023 04:53) (18 - 18)  SpO2: 97% (14 Mar 2023 04:53) (95% - 97%)    Parameters below as of 14 Mar 2023 04:53  Patient On (Oxygen Delivery Method): nasal cannula  O2 Flow (L/min): 3          PHYSICAL EXAM:  GEN: NAD, chronically frail-appearing, comfortable semirecumbent in bed  HEENT: anicteric sclera; MMM  RESP: no respiratory distress, diffuse rhonchi throughout with slightly improved aeration, no stridor or wheeze appreciated  CV: +S1/S2, RRR, no m/g/r  GI: soft, NT/ND, +BS  EXTREM: WWP; no clubbing or cyanosis, pretibial edema  Vascular: 2+ radial pulses B/L  NEURO: nonverbal at baseline, moving limbs    LABS:      CBC Full  -  ( 14 Mar 2023 06:00 )  WBC Count : 7.86 K/uL  RBC Count : 3.58 M/uL  Hemoglobin : 10.7 g/dL  Hematocrit : 31.4 %  Platelet Count - Automated : 155 K/uL  Mean Cell Volume : 87.7 fl  Mean Cell Hemoglobin : 29.9 pg  Mean Cell Hemoglobin Concentration : 34.1 gm/dL  Auto Neutrophil # : 5.66 K/uL  Auto Lymphocyte # : 1.19 K/uL  Auto Monocyte # : 0.70 K/uL  Auto Eosinophil # : 0.24 K/uL  Auto Basophil # : 0.03 K/uL  Auto Neutrophil % : 72.0 %  Auto Lymphocyte % : 15.1 %  Auto Monocyte % : 8.9 %  Auto Eosinophil % : 3.1 %  Auto Basophil % : 0.4 %        139  |  108  |  9   ----------------------------<  90  3.4<L>   |  24  |  0.33<L>    Ca    8.0<L>      14 Mar 2023 06:00  Phos  2.5     -  Mg     1.9     14    TPro  6.0  /  Alb  2.2<L>  /  TBili  0.4  /  DBili  x   /  AST  11  /  ALT  8<L>  /  AlkPhos  78  03-14    PT/INR - ( 12 Mar 2023 11:09 )   PT: 15.1 sec;   INR: 1.27 ratio         PTT - ( 12 Mar 2023 11:09 )  PTT:28.2 sec      Urinalysis Basic - ( 12 Mar 2023 11:24 )    Color: Yellow / Appearance: Clear / S.025 / pH: x  Gluc: x / Ketone: Large  / Bili: Negative / Urobili: Negative   Blood: x / Protein: 100 / Nitrite: Negative   Leuk Esterase: Negative / RBC: 10-25 /HPF / WBC 0-2 /HPF   Sq Epi: x / Non Sq Epi: Moderate /HPF / Bacteria: Few /HPF              RADIOLOGY & ADDITIONAL STUDIES:  No interval chest imaging for review

## 2023-03-14 NOTE — CONSULT NOTE ADULT - CONVERSATION DETAILS
Met with the pt's spouse Gerardo and her daughter Kendra Jones  at the bedside, discussed her current clinical condition and goals of care.   Explained that swallowing difficulties is common in advancing dementia. The person may have a weak swallow or lose the ability to swallow safely.  Pt with advanced dementia food and fluid intake tends to decrease slowly over time. Discussed the risk/ benefits of artificial nutrition.  PEG does not optimize pt's life but rather prolongs suffering.     Given her decline, they do not want her to suffer anymore and wish to focus on comfort measures only.  Educated them about hospice philosophy, services , and locations provided.  They agreed for pt to be discharged home with [facility], they already have 24/7 HHA in place to support pt.   Discussed risks/benefits of LST such as CPR/ intubation, PEG in the context of advanced dementia and debility. Family aware these invasive measures will not optimize the pt's life but may cause more stress and pain.  NATALIYA drafted DNR/DNI/no feeding tube/comfort measures only/DNH.  Chaplaincy offered and deferred at this time.   All questions answered.  Support provided.  d/w primary team

## 2023-03-14 NOTE — CONSULT NOTE ADULT - PROBLEM SELECTOR RECOMMENDATION 9
w Advanced dementia.  AOX0, non verbal, bedbound.  Total care.  FAST 7d. Appropriate for hospice  Discussed dementia trajectory with the pt's family and provided anticipatory guidance.  Educated them about hospice philosophy, services provided, and locations of care.    [facility]  -Ativan prn for agitation   -mouth care  DNR/DNI w Advanced dementia.  AOX0, non verbal, bedbound.  Total care.  FAST 7d. Appropriate for hospice  Discussed dementia trajectory with the pt's family and provided anticipatory guidance.  Educated them about hospice philosophy, services provided, and locations of care.    Family agreed for [facility] hosp[facility].  [facility] referral made  -Ativan prn for agitation   -mouth care  DNR/DNI

## 2023-03-14 NOTE — PROGRESS NOTE ADULT - PROBLEM SELECTOR PLAN 1
Pt w/ sepsis (HR 95, WBC 15), and AHRF on 3L NC  CXR and CTA showing no consolidations  Enterovirus +  c/w Azithro and CTX for now, will dc if cultures are negative  f/u Mycoplasma,  Strep PNA, and legionella negative  BCx NGTD  Pulm consulted Dr. Sotomayor  chest PT  hypertonic saline Pt w/ sepsis (HR 95, WBC 15), and AHRF on 3L NC  CXR and CTA showing no consolidations  Enterovirus +  d/c Azithro and CTX   f/u Mycoplasma,  Strep PNA, and legionella negative  BCx NGTD  Pulm consulted Dr. Sotomayor  chest PT  hypertonic saline  Rubinol PRN per palliative   DNR/DNI: no further labs

## 2023-03-14 NOTE — PROGRESS NOTE ADULT - PROBLEM SELECTOR PLAN 2
Pt w/ hx of PD, bedbound, minimally verbal at baseline  c/w home med of Carbidopa/Levodopa  Will require Palliative consult in AM  Failed formal Speech and swallow  NPO except meds Pt w/ hx of PD, bedbound, minimally verbal at baseline  c/w home med of Carbidopa/Levodopa  Failed formal Speech and swallow  NPO except meds  Palliative following: DNR/DNI, no further labs, home hospice

## 2023-03-14 NOTE — CONSULT NOTE ADULT - PROBLEM SELECTOR RECOMMENDATION 3
Clinical evidence indicates that the patient has Severe protein calorie malnutrition/ 3rd degree    In context of  Chronic Illness (>1 month)    Energy/Food intake <50% of estimated energy requirement >5 days  Weight loss: Moderate   Body Fat loss: Severe   muscle atrophy  Muscle mass loss: Severe     Strength: weakened severe bedbound    Recommend:   pleasure feeds as tolerated - aspiration precautions, careful hand-feeding, teaching to caregivers  nutritional supplements as tolerated, nutrition consult      Currently NPO  No feeding tube

## 2023-03-14 NOTE — PROGRESS NOTE ADULT - SUBJECTIVE AND OBJECTIVE BOX
PGY-1 Progress Note discussed with attending    PAGER #: [[phone]] TILL 5:00 PM  PLEASE CONTACT ON CALL TEAM:  - On Call Team (Please refer to Alireza) FROM 5:00 PM - 8:30PM  - Nightfloat Team FROM 8:30 -7:30 AM    CC: Patient is a 88y old  Female who presents with a chief complaint of AHRF (14 Mar 2023 13:55)      OVERNIGHT EVENTS: none    SUBJECTIVE / INTERVAL HPI: Patient seen and examined at bedside. More alert today. Gets startled when waking up. Still non-verbal. Not following commands. In no apparent distress. Family at bedside confirmed that patient had not been able to take her carbadopa/levodopa pills at home and request prescription for disintegrating tablets on discharge. Seen by palliative today with family who opted for DNR/DNI, no further labs or injections, with plans for home hospice.     ROS: Unable to obtain full ROS    VITAL SIGNS:  Vital Signs Last 24 Hrs  T(C): 37.8 (14 Mar 2023 13:51), Max: 37.8 (14 Mar 2023 13:51)  T(F): 100 (14 Mar 2023 13:51), Max: 100 (14 Mar 2023 13:51)  HR: 107 (14 Mar 2023 13:51) (104 - 107)  BP: 145/88 (14 Mar 2023 13:51) (121/81 - 145/88)  BP(mean): --  RR: 18 (14 Mar 2023 13:51) (18 - 18)  SpO2: 95% (14 Mar 2023 13:51) (95% - 97%)    Parameters below as of 14 Mar 2023 13:51  Patient On (Oxygen Delivery Method): nasal cannula  O2 Flow (L/min): 3      PHYSICAL EXAM:    General: WDWN, non-verbal,   HEENT: NC/AT; PERRL, anicteric sclera; dry mucous membranes  Neck: supple  Cardiovascular: +S1/S2; RRR  Respiratory: increased ventilatory effort, possible pursed lip breathing, coarse expiratory rhonchi B/L; no wheezes or rales  Gastrointestinal: soft, NT/ND; +BSx4  Extremities: WWP; no edema, clubbing or cyanosis  Vascular: 2+ radial, DP/PT pulses B/L  Skin: Warm, dry, good turgor, no rashes, or ecchymoses  Neurological: AAOx0; non-verbal, not following commands, +hand tremor, +cogwheel rigidity     MEDICATIONS:  MEDICATIONS  (STANDING):  albuterol/ipratropium for Nebulization 3 milliLiter(s) Nebulizer every 6 hours  carbidopa/levodopa 25/100 Disintegrating Tablet 1 Tablet(s) Oral <User Schedule>  insulin lispro (ADMELOG) corrective regimen sliding scale   SubCutaneous every 6 hours  lactated ringers. 1000 milliLiter(s) (50 mL/Hr) IV Continuous <Continuous>  sodium chloride 3%  Inhalation 4 milliLiter(s) Inhalation every 12 hours    MEDICATIONS  (PRN):  bisacodyl 5 milliGRAM(s) Oral every 12 hours PRN Constipation  glycopyrrolate Injectable 0.4 milliGRAM(s) IV Push every 6 hours PRN secretions  LORazepam   Injectable 1 milliGRAM(s) IV Push every 4 hours PRN Agitation      ALLERGIES:  Allergies    No Known Allergies    Intolerances        LABS:                        10.7   7.86  )-----------( 155      ( 14 Mar 2023 06:00 )             31.4     03-14    139  |  108  |  9   ----------------------------<  90  3.4<L>   |  24  |  0.33<L>    Ca    8.0<L>      14 Mar 2023 06:00  Phos  2.5     03-14  Mg     1.9     03-14    TPro  6.0  /  Alb  2.2<L>  /  TBili  0.4  /  DBili  x   /  AST  11  /  ALT  8<L>  /  AlkPhos  78  03-14        CAPILLARY BLOOD GLUCOSE      POCT Blood Glucose.: 99 mg/dL (14 Mar 2023 11:25)      RADIOLOGY & ADDITIONAL TESTS: Reviewed.

## 2023-03-15 NOTE — PROGRESS NOTE ADULT - PROBLEM SELECTOR PLAN 4
Clinical evidence indicates that the patient has Severe protein calorie malnutrition/ 3rd degree    In context of  Chronic Illness (>1 month)    Energy/Food intake <50% of estimated energy requirement >5 days  Weight loss: Moderate   Body Fat loss: Severe   muscle atrophy  Muscle mass loss: Severe     Strength: weakened severe bedbound    Recommend:   pleasure feeds as tolerated - aspiration precautions, careful hand-feeding, teaching to caregivers  nutritional supplements as tolerated, nutrition consult      Currently NPO  No feeding tube.

## 2023-03-15 NOTE — DIETITIAN INITIAL EVALUATION ADULT - OTHER INFO
Pt lives home with family PTA, confused, lethargy; poor intake PTA, dysphagia, failed Swallow evaluation 3/13/23, NPO x 3 to 4d in-house; Unknown food allergies per Chart; s/p Palliative consult, Severe Malnutrition, no Tube Feeding, on Comfort Care only per MD

## 2023-03-15 NOTE — DIETITIAN INITIAL EVALUATION ADULT - FACTORS AFF FOOD INTAKE
acute on chronic comorbidities including advanced with dementia, Parkinson's disease/change in mental status/difficulty swallowing/pain/persistent lack of appetite

## 2023-03-15 NOTE — DISCHARGE NOTE NURSING/CASE MANAGEMENT/SOCIAL WORK - NSDCVIVACCINE_GEN_ALL_CORE_FT
influenza, injectable, quadrivalent, preservative free; 20-Oct-2021 16:29; Roosevelt Friend (RN); [employer]; JL5C3 (Exp. Date: 30-Jun-2022); IntraMuscular; Deltoid Left.; 0.5 milliLiter(s); VIS (VIS Published: 06-Aug-2021, VIS Presented: 20-Oct-2021);

## 2023-03-15 NOTE — PROGRESS NOTE ADULT - SUBJECTIVE AND OBJECTIVE BOX
PGY-1 Progress Note discussed with attending    PAGER #: [[phone]] TILL 5:00 PM  PLEASE CONTACT ON CALL TEAM:  - On Call Team (Please refer to Alireza) FROM 5:00 PM - 8:30PM  - Nightfloat Team FROM 8:30 -7:30 AM    CC: Patient is a 88y old  Female who presents with a chief complaint of Acute respiratory failure with hypoxia     (15 Mar 2023 10:37)      OVERNIGHT EVENTS: none    SUBJECTIVE / INTERVAL HPI: Patient seen and examined at bedside.     ROS: Unable to obtain full ROS    VITAL SIGNS:  Vital Signs Last 24 Hrs  T(C): 38 (15 Mar 2023 13:18), Max: 38 (15 Mar 2023 13:18)  T(F): 100.4 (15 Mar 2023 13:18), Max: 100.4 (15 Mar 2023 13:18)  HR: 114 (15 Mar 2023 13:18) (108 - 114)  BP: 94/50 (15 Mar 2023 13:18) (94/50 - 144/90)  BP(mean): --  RR: 18 (15 Mar 2023 13:18) (18 - 18)  SpO2: 97% (15 Mar 2023 13:18) (92% - 97%)    Parameters below as of 15 Mar 2023 13:18  Patient On (Oxygen Delivery Method): nasal cannula  O2 Flow (L/min): 3      PHYSICAL EXAM:    General: WDWN, non-verbal  HEENT: NC/AT; PERRL, anicteric sclera; dry mucous membranes  Neck: supple  Cardiovascular: +S1/S2; RRR  Respiratory: increased ventilatory effort, possible pursed lip breathing, coarse expiratory rhonchi B/L; no wheezes or rales  Gastrointestinal: soft, NT/ND; +BSx4  Extremities: WWP; no edema, clubbing or cyanosis  Vascular: 2+ radial, DP/PT pulses B/L  Skin: Warm, dry, good turgor, no rashes, or ecchymoses  Neurological: AAOx0; non-verbal, not following commands, +hand tremor, +cogwheel rigidity     MEDICATIONS:  MEDICATIONS  (STANDING):  sodium chloride 3%  Inhalation 4 milliLiter(s) Inhalation every 12 hours    MEDICATIONS  (PRN):  bisacodyl 5 milliGRAM(s) Oral every 12 hours PRN Constipation  glycopyrrolate Injectable 0.4 milliGRAM(s) IV Push every 6 hours PRN secretions  LORazepam   Injectable 1 milliGRAM(s) IV Push every 4 hours PRN Agitation  morphine  - Injectable 2 milliGRAM(s) IV Push every 2 hours PRN dyspnea      ALLERGIES:  Allergies    No Known Allergies    Intolerances        LABS:                        10.7   7.86  )-----------( 155      ( 14 Mar 2023 06:00 )             31.4     03-14    139  |  108  |  9   ----------------------------<  90  3.4<L>   |  24  |  0.33<L>    Ca    8.0<L>      14 Mar 2023 06:00  Phos  2.5     03-14  Mg     1.9     03-14    TPro  6.0  /  Alb  2.2<L>  /  TBili  0.4  /  DBili  x   /  AST  11  /  ALT  8<L>  /  AlkPhos  78  03-14        CAPILLARY BLOOD GLUCOSE      POCT Blood Glucose.: 151 mg/dL (15 Mar 2023 11:29)      RADIOLOGY & ADDITIONAL TESTS: Reviewed.

## 2023-03-15 NOTE — DISCHARGE NOTE NURSING/CASE MANAGEMENT/SOCIAL WORK - PATIENT PORTAL LINK FT
You can access the FollowMyHealth Patient Portal offered by  by registering at the following website: [email or web address]. By joining [facility]’s FollowMyHealth portal, you will also be able to view your health information using other applications (apps) compatible with our system.

## 2023-03-15 NOTE — PROGRESS NOTE ADULT - SUBJECTIVE AND OBJECTIVE BOX
PULMONARY CONSULT SERVICE FOLLOW-UP NOTE    INTERVAL HPI:  Reviewed chart and overnight events; patient seen and examined at bedside.    MEDICATIONS:  Pulmonary:  albuterol/ipratropium for Nebulization 3 milliLiter(s) Nebulizer every 6 hours  sodium chloride 3%  Inhalation 4 milliLiter(s) Inhalation every 12 hours    Antimicrobials:    Anticoagulants:    Cardiac:      Allergies    No Known Allergies    Intolerances        Vital Signs Last 24 Hrs  T(C): 36.4 (15 Mar 2023 04:56), Max: 37.8 (14 Mar 2023 13:51)  T(F): 97.5 (15 Mar 2023 04:56), Max: 100 (14 Mar 2023 13:51)  HR: 108 (15 Mar 2023 04:56) (107 - 108)  BP: 116/78 (15 Mar 2023 04:56) (116/78 - 145/88)  BP(mean): --  RR: 18 (15 Mar 2023 07:00) (18 - 18)  SpO2: 93% (15 Mar 2023 07:00) (92% - 97%)    Parameters below as of 15 Mar 2023 07:00  Patient On (Oxygen Delivery Method): nasal cannula  O2 Flow (L/min): 3          PHYSICAL EXAM:  GEN: NAD, chronically frail-appearing, comfortable semirecumbent in bed  HEENT: anicteric sclera; MMM  RESP: no respiratory distress, diffuse rhonchi throughout with slightly improved aeration, no stridor or wheeze appreciated  CV: +S1/S2, RRR, no m/g/r  GI: soft, NT/ND, +BS  EXTREM: WWP; no clubbing or cyanosis, pretibial edema  Vascular: 2+ radial pulses B/L  NEURO: nonverbal at baseline, moving limbs    LABS:      CBC Full  -  ( 14 Mar 2023 06:00 )  WBC Count : 7.86 K/uL  RBC Count : 3.58 M/uL  Hemoglobin : 10.7 g/dL  Hematocrit : 31.4 %  Platelet Count - Automated : 155 K/uL  Mean Cell Volume : 87.7 fl  Mean Cell Hemoglobin : 29.9 pg  Mean Cell Hemoglobin Concentration : 34.1 gm/dL  Auto Neutrophil # : 5.66 K/uL  Auto Lymphocyte # : 1.19 K/uL  Auto Monocyte # : 0.70 K/uL  Auto Eosinophil # : 0.24 K/uL  Auto Basophil # : 0.03 K/uL  Auto Neutrophil % : 72.0 %  Auto Lymphocyte % : 15.1 %  Auto Monocyte % : 8.9 %  Auto Eosinophil % : 3.1 %  Auto Basophil % : 0.4 %    03-14    139  |  108  |  9   ----------------------------<  90  3.4<L>   |  24  |  0.33<L>    Ca    8.0<L>      14 Mar 2023 06:00  Phos  2.5     03-14  Mg     1.9     03-14    TPro  6.0  /  Alb  2.2<L>  /  TBili  0.4  /  DBili  x   /  AST  11  /  ALT  8<L>  /  AlkPhos  78  03-14                    RADIOLOGY & ADDITIONAL STUDIES:  No interval chest imaging for review PULMONARY CONSULT SERVICE FOLLOW-UP NOTE    INTERVAL HPI:  Reviewed chart and overnight events; patient seen and examined at bedside. Awake this AM but not responsive to verbal query. Appears comfortable, in no respiratory distress.      MEDICATIONS:  Pulmonary:  albuterol/ipratropium for Nebulization 3 milliLiter(s) Nebulizer every 6 hours  sodium chloride 3%  Inhalation 4 milliLiter(s) Inhalation every 12 hours    Antimicrobials:    Anticoagulants:    Cardiac:      Allergies    No Known Allergies    Intolerances        Vital Signs Last 24 Hrs  T(C): 36.4 (15 Mar 2023 04:56), Max: 37.8 (14 Mar 2023 13:51)  T(F): 97.5 (15 Mar 2023 04:56), Max: 100 (14 Mar 2023 13:51)  HR: 108 (15 Mar 2023 04:56) (107 - 108)  BP: 116/78 (15 Mar 2023 04:56) (116/78 - 145/88)  BP(mean): --  RR: 18 (15 Mar 2023 07:00) (18 - 18)  SpO2: 93% (15 Mar 2023 07:00) (92% - 97%)    Parameters below as of 15 Mar 2023 07:00  Patient On (Oxygen Delivery Method): nasal cannula  O2 Flow (L/min): 3          PHYSICAL EXAM:  GEN: NAD, chronically frail-appearing, comfortable semirecumbent in bed  HEENT: anicteric sclera; MMM  RESP: no respiratory distress, diffuse rhonchi throughout with slightly improved aeration, no stridor or wheeze appreciated  CV: +S1/S2, RRR, no m/g/r  GI: soft, NT/ND, +BS  EXTREM: WWP; no clubbing or cyanosis, pretibial edema  Vascular: 2+ radial pulses B/L  NEURO: nonverbal at baseline, moving limbs    LABS:      CBC Full  -  ( 14 Mar 2023 06:00 )  WBC Count : 7.86 K/uL  RBC Count : 3.58 M/uL  Hemoglobin : 10.7 g/dL  Hematocrit : 31.4 %  Platelet Count - Automated : 155 K/uL  Mean Cell Volume : 87.7 fl  Mean Cell Hemoglobin : 29.9 pg  Mean Cell Hemoglobin Concentration : 34.1 gm/dL  Auto Neutrophil # : 5.66 K/uL  Auto Lymphocyte # : 1.19 K/uL  Auto Monocyte # : 0.70 K/uL  Auto Eosinophil # : 0.24 K/uL  Auto Basophil # : 0.03 K/uL  Auto Neutrophil % : 72.0 %  Auto Lymphocyte % : 15.1 %  Auto Monocyte % : 8.9 %  Auto Eosinophil % : 3.1 %  Auto Basophil % : 0.4 %    03-14    139  |  108  |  9   ----------------------------<  90  3.4<L>   |  24  |  0.33<L>    Ca    8.0<L>      14 Mar 2023 06:00  Phos  2.5     03-14  Mg     1.9     03-14    TPro  6.0  /  Alb  2.2<L>  /  TBili  0.4  /  DBili  x   /  AST  11  /  ALT  8<L>  /  AlkPhos  78  03-14                    RADIOLOGY & ADDITIONAL STUDIES:  No interval chest imaging for review

## 2023-03-15 NOTE — PROGRESS NOTE ADULT - ATTENDING COMMENTS
89yo F PMHx of Parkinsons Disease, GERD who presented with acute hypoxic respiratory failure admitted for viral pneumonia with CTA showing trachomalacia, tracheal secretions.     A/P  #Acute Hypoxic Respiratory Failure  #Sepsis due to Pneumonia  #Tracheomalacia  #Parkinson's Disease  #Functional Quadriplegia  #GERD  - palliative consult to help with GOC discussion  - dc ceftriaxone and azithro, blood cx NGTD  - failed SLP eval  - continue supportive treatment   -pulmonary consult  -continue on levidopa-carbidopa dissolvable
Patient presented with hypoxic respiratory failure likely with viral pneumonia. Patient weaned to 2L Nasal Cannula. Failed Speech and Swallow exam. Keep NPO with IVF. Continue pulmonary toileting. Appreciate pulmonary follow-up. Mucinex, saline nebs, chest PT, duonebs. Continue on ceftriaxone and azithromycin for now. Palliative consult given advanced Parkinson's disease.
87yo F PMHx of Parkinsons Disease, GERD who presented with acute hypoxic respiratory failure admitted for viral pneumonia with CTA showing trachomalacia, tracheal secretions.     A/P  #Acute Hypoxic Respiratory Failure  #Sepsis due to Pneumonia  #Tracheomalacia  #Parkinson's Disease  #Functional Quadriplegia  #GERD  Patient is DNR/DNI, plan for potential inpatient vs outpatient hospice.  Likely inpatient hospice given worsening in respiratory status. Palliative care following  Will continue supportive care. No more blood work.

## 2023-03-15 NOTE — PROGRESS NOTE ADULT - TIME BILLING
- Review of chart (interval documentation, labs/studies, VS trends)  - Personal review of chest imaging  - Medication reconciliation  - Bedside interview and physical examination  - Bedside SpO2 monitoring
- Review of chart (interval documentation, labs/studies, VS trends)  - Personal review of chest imaging  - Medication reconciliation  - Bedside interview and physical examination  - Bedside SpO2 monitoring  - Coordination of care with primary team

## 2023-03-15 NOTE — H&P ADULT - NSHPPHYSICALEXAM_GEN_ALL_CORE
Vital Signs Last 24 Hrs  T(C): 38 (15 Mar 2023 13:18), Max: 38 (15 Mar 2023 13:18)  T(F): 100.4 (15 Mar 2023 13:18), Max: 100.4 (15 Mar 2023 13:18)  HR: 114 (15 Mar 2023 13:18) (108 - 114)  BP: 94/50 (15 Mar 2023 13:18) (94/50 - 144/90)  BP(mean): --  RR: 18 (15 Mar 2023 13:18) (18 - 18)  SpO2: 97% (15 Mar 2023 13:18) (92% - 97%)    Parameters below as of 15 Mar 2023 07:00  Patient On (Oxygen Delivery Method): nasal cannula  O2 Flow (L/min): 3    Palliative Performance Scale/Karnofsky Score: 20%  http://npcrc.org/files/news/palliative_performance_scale_[email or web address].pdf    General: obese, lethargic, AOX0 non verbal, IWOB       HEENT: no abnormal lesion, dry mouth  Lungs: labored on NC, +pursed lips breathing, mild audible secretions  CV: RRR, S1S2  GI: soft non distended non tender  incontinent  : incontinent    Musculoskeletal: weakness x4 , mostly/fully bedbound/wheelchair bound  Skin: no abnormal skin lesions, poor skin turgor, pressure ulcer stage:   Neuro: unable to follow commands   Oral intake ability: unable/only mouth care

## 2023-03-15 NOTE — PROGRESS NOTE ADULT - PROBLEM SELECTOR PLAN 2
Pt w/ hx of PD, bedbound, minimally verbal at baseline  c/w home med of Carbidopa/Levodopa  Failed formal Speech and swallow  NPO except meds  Palliative following: DNR/DNI, no further labs, home hospice

## 2023-03-15 NOTE — PATIENT PROFILE ADULT - VISION (WITH CORRECTIVE LENSES IF THE PATIENT USUALLY WEARS THEM):
none
Severely impaired: cannot locate objects without hearing or touching them or patient nonresponsive.

## 2023-03-15 NOTE — DISCHARGE NOTE PROVIDER - NSDCCPCAREPLAN_GEN_ALL_CORE_FT
PRINCIPAL DISCHARGE DIAGNOSIS  Diagnosis: Sepsis with acute hypoxic respiratory failure  Assessment and Plan of Treatment: Pt w/ sepsis (HR 95, WBC 15), and AHRF on 3L NC  CXR and CTA showing no consolidations  Enterovirus +  d/c Azithro and CTX   f/u Mycoplasma,  Strep PNA, and legionella negative  BCx NGTD  Pulm consulted Dr. Sotomayor  chest PT  hypertonic saline  Rubinol PRN per palliative   DNR/DNI, no further labs, inpatient hospice.      SECONDARY DISCHARGE DIAGNOSES  Diagnosis: Parkinson's disease  Assessment and Plan of Treatment: Pt w/ hx of PD, bedbound, minimally verbal at baseline  c/w home med of Carbidopa/Levodopa  Failed formal Speech and swallow  NPO except meds  Palliative following: DNR/DNI, no further labs, inpatient hospice.    Diagnosis: Functional quadriplegia  Assessment and Plan of Treatment: due to advanced Parkinsons disease, bedbound  requires total assist with all activities.  DNR/DNI, no further labs, inpatient hospice.    Diagnosis: Diabetes  Assessment and Plan of Treatment: Pt w/ hx of DM not on any meds  maganged with SSI.]  DNR/DNI, no further labs, inpatient hospice.    Diagnosis: Chronic constipation  Assessment and Plan of Treatment: Pt w/ chronic constipation  c/w bisacodyl PRN.  DNR/DNI, no further labs, inpatient hospice.

## 2023-03-15 NOTE — PROGRESS NOTE ADULT - PROBLEM SELECTOR PLAN 2
w Advanced dementia.  AOX0, non verbal, bedbound.  Total care.  FAST 7d. Appropriate for hospice  Discussed dementia trajectory with the pt's family and provided anticipatory guidance.  Educated them about hospice philosophy, services provided, and locations of care.    Family agreed [facility].  [facility] referral made  -Ativan prn for agitation   -mouth care  DNR/DNI. w Advanced dementia.  AOX0, non verbal, bedbound.  Total care.  FAST 7d. Appropriate for hospice  Discussed dementia trajectory with the pt's family and provided anticipatory guidance.  Educated them about hospice philosophy, services provided, and locations of care.    Pt is for IPU for resp distress

## 2023-03-15 NOTE — PATIENT PROFILE ADULT - FALL HARM RISK - HARM RISK INTERVENTIONS

## 2023-03-15 NOTE — PROGRESS NOTE ADULT - CONVERSATION DETAILS
Spoke with the pt's daughter Kendra via phone, explained that the pt condition has worsened with increased work of breathing requiring increased oxygen support.  She is appropriate for inpatient hospice for symptom management with IV medications.  Kendra stated she needs to seethe pt before making any decisions.     Later met with pt's daughter at the bedside, she stated she has to wait for her father to make decisions about treatment options. Spoke with the pt's daughter Kendra via phone, explained that the pt condition has worsened with increased work of breathing requiring increased oxygen support.  She is appropriate for inpatient hospice for symptom management with IV medications.  Kendra stated she needs to seethe pt before making any decisions.  DNR/DNI on file.     Later met with pt's daughter at the bedside, she stated she and her family are in agreement for inpatient hospice for symptom management with IV medications.  Chaplaincy offered and accepted.  All questions answered.  Support provided.  d/w primary team

## 2023-03-15 NOTE — PROGRESS NOTE ADULT - PROBLEM SELECTOR PLAN 1
unclear etiology. IWOB, pursed lip breathing.   Pt is comfort measures only, for home hospice.    Currently more appropriate for inpatient hospice for IV symptom management.   Family needs to see her before deciding on IPU or medications for symptom management unclear etiology. likely 2/2 aspiration.  IWOB, pursed lip breathing. on 4L NC  Pt is comfort measures only, for home hospice.    Currently more appropriate for inpatient hospice for IV symptom management.   Family agreed w plan.  SW referal made.  -Morphine 2 mg IVP q2prn  -Ativan prn for agitation   -mouth care  DNR/DNI.

## 2023-03-15 NOTE — PROGRESS NOTE ADULT - ASSESSMENT
88F never smoker, bedbound and nonverbal at baseline, h/o PD, dementia, HTN, HLD p/w worsening AMS, found +entero/rhinovirus. CT notable for markedly narrowed airways, traceobronchomalacia vs. dynamic airway collapse, with secretion impaction. Sx likely secondary to acute entero/rhinovirus, no evidence of pna on chest imaging and without fever; leukocytosis now normalized.        Recommendations:  - C/w duonebs  - Given no evidence of superimposed pna, unclear role for abx; agree with observing off abx  - chest PT TID  - 3% hypertonic saline nebs TID  - Pt is NPO; if able to take PO meds can consider trial of 600-1200mg guaifenesin-ER standing BID for mucolytic effect   - Titrate supplemental O2 with goal SpO2 92-95%; monitor exertional SpO2 and document  - Palliative care c/s appreciated; pt pending home hospice referral  - Aspiration precautions; mentation limiting participation in SLP dez Sotomayor MD  Pulmonary & Critical Care  Available on Teams  88F never smoker, bedbound and nonverbal at baseline, h/o PD, dementia, HTN, HLD p/w worsening AMS, found +entero/rhinovirus. CT notable for markedly narrowed airways, traceobronchomalacia vs. dynamic airway collapse, with secretion impaction. Sx likely secondary to acute entero/rhinovirus, no evidence of pna on chest imaging and without fever; leukocytosis now normalized.        Recommendations:  - C/w duonebs  - Given no evidence of superimposed pna, unclear role for abx; agree with observing off abx  - chest PT TID  - 3% hypertonic saline nebs TID  - Pt is NPO; if able to take PO meds can consider trial of 600-1200mg guaifenesin-ER standing BID for mucolytic effect   - Titrate supplemental O2 with goal SpO2 92-95%; monitor exertional SpO2 and document  - Palliative care c/s appreciated; pt pending home hospice referral. Comfort measures  - Aspiration precautions; mentation limiting participation in SLP eval  - Please contact Pulmonology for further questions or concerns       Franny Sotomayor MD  Pulmonary & Critical Care  Available on Teams

## 2023-03-15 NOTE — PROGRESS NOTE ADULT - PROBLEM SELECTOR PLAN 5
due to advanced Parkinsons disease, bedbound  requires total assist with all activities
Bedbound. Total care.  has 24/7 HHA support at home.  Supportive care  Freq positioning    Comfort only

## 2023-03-15 NOTE — DISCHARGE NOTE PROVIDER - NSDCMRMEDTOKEN_GEN_ALL_CORE_FT
bisacodyl 5 mg oral delayed release tablet: 1 tab(s) orally every 12 hours, As needed, Constipation  glycopyrrolate 0.2 mg/mL injectable solution: 0.4 milligram(s) injectable every 6 hours, As Needed  LORazepam: 1 milligram(s) injectable every 4 hours, As Needed  morphine: 2 milligram(s) injectable every 2 hours, As Needed  sodium chloride 3% inhalation solution: 4 milliliter(s) inhaled every 12 hours

## 2023-03-15 NOTE — PROGRESS NOTE ADULT - PROBLEM SELECTOR PLAN 1
Pt w/ sepsis (HR 95, WBC 15), and AHRF on 3L NC  CXR and CTA showing no consolidations  Enterovirus +  d/c Azithro and CTX   f/u Mycoplasma,  Strep PNA, and legionella negative  BCx NGTD  Pulm consulted Dr. Sotomayor  chest PT  hypertonic saline  Rubinol PRN per palliative   DNR/DNI: no further labs

## 2023-03-15 NOTE — DIETITIAN INITIAL EVALUATION ADULT - PERTINENT LABORATORY DATA
03-14    139  |  108  |  9   ----------------------------<  90  3.4<L>   |  24  |  0.33<L>    Ca    8.0<L>      14 Mar 2023 06:00  Phos  2.5     03-14  Mg     1.9     03-14    TPro  6.0  /  Alb  2.2<L>  /  TBili  0.4  /  DBili  x   /  AST  11  /  ALT  8<L>  /  AlkPhos  78  03-14  POCT Blood Glucose.: 135 mg/dL (03-15-23 @ 06:36)

## 2023-03-15 NOTE — PROGRESS NOTE ADULT - SUBJECTIVE AND OBJECTIVE BOX
follow up on:  complex medical decision making related to goals of care    OVERNIGHT EVENTS:  Pt remains lethargic, noted with IWOB     Present Symptoms:    Unable to obtain due to poor mentation    MEDICATIONS  (STANDING):  albuterol/ipratropium for Nebulization 3 milliLiter(s) Nebulizer every 6 hours  carbidopa/levodopa 25/100 Disintegrating Tablet 1 Tablet(s) Oral <User Schedule>  dextrose 5% + lactated ringers. 1000 milliLiter(s) (50 mL/Hr) IV Continuous <Continuous>  insulin lispro (ADMELOG) corrective regimen sliding scale   SubCutaneous every 6 hours  sodium chloride 3%  Inhalation 4 milliLiter(s) Inhalation every 12 hours    MEDICATIONS  (PRN):  bisacodyl 5 milliGRAM(s) Oral every 12 hours PRN Constipation  glycopyrrolate Injectable 0.4 milliGRAM(s) IV Push every 6 hours PRN secretions  LORazepam   Injectable 1 milliGRAM(s) IV Push every 4 hours PRN Agitation      PHYSICAL EXAM:  Vital Signs Last 24 Hrs  T(C): 36.4 (15 Mar 2023 04:56), Max: 37.8 (14 Mar 2023 13:51)  T(F): 97.5 (15 Mar 2023 04:56), Max: 100 (14 Mar 2023 13:51)  HR: 108 (15 Mar 2023 04:56) (107 - 108)  BP: 116/78 (15 Mar 2023 04:56) (116/78 - 145/88)  BP(mean): --  RR: 18 (15 Mar 2023 07:00) (18 - 18)  SpO2: 93% (15 Mar 2023 07:00) (92% - 97%)    Parameters below as of 15 Mar 2023 07:00  Patient On (Oxygen Delivery Method): nasal cannula  O2 Flow (L/min): 3      Karnofsky Performance Score/Palliative Performance Status Version: 20   %    General: alert  oriented x ____    lethargic distressed cachexia  nonverbal  unarousable verbal      HEENT: no abnormal lesions dry mouth  ET tube/trach oral lesions:  Lungs: comfortable tachypnea/labored breathing  excessive secretions  CV: RRR, S1S2, tachycardia  GI: soft non distended non tender  incontinent               PEG/NG/OG tube  constipation  last BM:   : incontinent  oliguria/anuria  eugene  Musculoskeletal: weakness  edema   ambulatory with assistance   bedbound/wheelchair bound  Skin: no skin lesions, poor skin turgor, pressure ulcer stage:   Neuro: no deficits, cognitive impairment dsyphagia/dysarthria paresis  Oral intake ability: unable/only mouth care minimal moderate full capability    LABS:                          10.7   7.86  )-----------( 155      ( 14 Mar 2023 06:00 )             31.4     03-14    139  |  108  |  9   ----------------------------<  90  3.4<L>   |  24  |  0.33<L>    Ca    8.0<L>      14 Mar 2023 06:00  Phos  2.5     03-14  Mg     1.9     03-14    TPro  6.0  /  Alb  2.2<L>  /  TBili  0.4  /  DBili  x   /  AST  11  /  ALT  8<L>  /  AlkPhos  78  03-14        RADIOLOGY & ADDITIONAL STUDIES: reviewed    ADVANCE DIRECTIVES: DNR/DNI   follow up on:  complex medical decision making related to goals of care    OVERNIGHT EVENTS:  Pt remains lethargic, noted with IWOB     Present Symptoms:    Unable to obtain due to poor mentation    MEDICATIONS  (STANDING):  albuterol/ipratropium for Nebulization 3 milliLiter(s) Nebulizer every 6 hours  carbidopa/levodopa 25/100 Disintegrating Tablet 1 Tablet(s) Oral <User Schedule>  dextrose 5% + lactated ringers. 1000 milliLiter(s) (50 mL/Hr) IV Continuous <Continuous>  insulin lispro (ADMELOG) corrective regimen sliding scale   SubCutaneous every 6 hours  sodium chloride 3%  Inhalation 4 milliLiter(s) Inhalation every 12 hours    MEDICATIONS  (PRN):  bisacodyl 5 milliGRAM(s) Oral every 12 hours PRN Constipation  glycopyrrolate Injectable 0.4 milliGRAM(s) IV Push every 6 hours PRN secretions  LORazepam   Injectable 1 milliGRAM(s) IV Push every 4 hours PRN Agitation      PHYSICAL EXAM:  Vital Signs Last 24 Hrs  T(C): 36.4 (15 Mar 2023 04:56), Max: 37.8 (14 Mar 2023 13:51)  T(F): 97.5 (15 Mar 2023 04:56), Max: 100 (14 Mar 2023 13:51)  HR: 108 (15 Mar 2023 04:56) (107 - 108)  BP: 116/78 (15 Mar 2023 04:56) (116/78 - 145/88)  BP(mean): --  RR: 18 (15 Mar 2023 07:00) (18 - 18)  SpO2: 93% (15 Mar 2023 07:00) (92% - 97%)    Parameters below as of 15 Mar 2023 07:00  Patient On (Oxygen Delivery Method): nasal cannula  O2 Flow (L/min): 3    Palliative Performance Scale/Karnofsky Score: 20%  http://npcrc.org/files/news/palliative_performance_scale_[email or web address]    General: obese, lethargic, AOX0 non verbal, IWOB       HEENT: no abnormal lesion, dry mouth  Lungs: labored on NC, +audible excessive secretions  CV: RRR, S1S2  GI: soft non distended non tender  incontinent  : incontinent    Musculoskeletal: weakness x4 , mostly/fully bedbound/wheelchair bound  Skin: no abnormal skin lesions, poor skin turgor, pressure ulcer stage:   Neuro: unable to follow commands   Oral intake ability: unable/only mouth care    LABS:                          10.7   7.86  )-----------( 155      ( 14 Mar 2023 06:00 )             31.4     03-14    139  |  108  |  9   ----------------------------<  90  3.4<L>   |  24  |  0.33<L>    Ca    8.0<L>      14 Mar 2023 06:00  Phos  2.5     03-14  Mg     1.9     03-14    TPro  6.0  /  Alb  2.2<L>  /  TBili  0.4  /  DBili  x   /  AST  11  /  ALT  8<L>  /  AlkPhos  78  03-14        RADIOLOGY & ADDITIONAL STUDIES: reviewed    ADVANCE DIRECTIVES: DNR/DNI   follow up on:  complex medical decision making related to goals of care    OVERNIGHT EVENTS:  Pt remains lethargic, noted with IWOB     Present Symptoms:    Unable to obtain due to poor mentation    MEDICATIONS  (STANDING):  albuterol/ipratropium for Nebulization 3 milliLiter(s) Nebulizer every 6 hours  carbidopa/levodopa 25/100 Disintegrating Tablet 1 Tablet(s) Oral <User Schedule>  dextrose 5% + lactated ringers. 1000 milliLiter(s) (50 mL/Hr) IV Continuous <Continuous>  insulin lispro (ADMELOG) corrective regimen sliding scale   SubCutaneous every 6 hours  sodium chloride 3%  Inhalation 4 milliLiter(s) Inhalation every 12 hours    MEDICATIONS  (PRN):  bisacodyl 5 milliGRAM(s) Oral every 12 hours PRN Constipation  glycopyrrolate Injectable 0.4 milliGRAM(s) IV Push every 6 hours PRN secretions  LORazepam   Injectable 1 milliGRAM(s) IV Push every 4 hours PRN Agitation      PHYSICAL EXAM:  Vital Signs Last 24 Hrs  T(C): 36.4 (15 Mar 2023 04:56), Max: 37.8 (14 Mar 2023 13:51)  T(F): 97.5 (15 Mar 2023 04:56), Max: 100 (14 Mar 2023 13:51)  HR: 108 (15 Mar 2023 04:56) (107 - 108)  BP: 116/78 (15 Mar 2023 04:56) (116/78 - 145/88)  BP(mean): --  RR: 18 (15 Mar 2023 07:00) (18 - 18)  SpO2: 93% (15 Mar 2023 07:00) (92% - 97%)    Parameters below as of 15 Mar 2023 07:00  Patient On (Oxygen Delivery Method): nasal cannula  O2 Flow (L/min): 3    Palliative Performance Scale/Karnofsky Score: 20%  http://npcrc.org/files/news/palliative_performance_scale_[email or web address]    General: obese, lethargic, AOX0 non verbal, IWOB       HEENT: no abnormal lesion, dry mouth  Lungs: labored on NC, +pursed lips breathing   CV: RRR, S1S2  GI: soft non distended non tender  incontinent  : incontinent    Musculoskeletal: weakness x4 , mostly/fully bedbound/wheelchair bound  Skin: no abnormal skin lesions, poor skin turgor, pressure ulcer stage:   Neuro: unable to follow commands   Oral intake ability: unable/only mouth care    LABS:                          10.7   7.86  )-----------( 155      ( 14 Mar 2023 06:00 )             31.4     03-14    139  |  108  |  9   ----------------------------<  90  3.4<L>   |  24  |  0.33<L>    Ca    8.0<L>      14 Mar 2023 06:00  Phos  2.5     03-14  Mg     1.9     03-14    TPro  6.0  /  Alb  2.2<L>  /  TBili  0.4  /  DBili  x   /  AST  11  /  ALT  8<L>  /  AlkPhos  78  03-14        RADIOLOGY & ADDITIONAL STUDIES: reviewed    ADVANCE DIRECTIVES: DNR/DNI

## 2023-03-15 NOTE — DIETITIAN INITIAL EVALUATION ADULT - PERTINENT MEDS FT
MEDICATIONS  (STANDING):  carbidopa/levodopa 25/100 Disintegrating Tablet 1 Tablet(s) Oral <User Schedule>  sodium chloride 3%  Inhalation 4 milliLiter(s) Inhalation every 12 hours    MEDICATIONS  (PRN):  bisacodyl 5 milliGRAM(s) Oral every 12 hours PRN Constipation  glycopyrrolate Injectable 0.4 milliGRAM(s) IV Push every 6 hours PRN secretions  LORazepam   Injectable 1 milliGRAM(s) IV Push every 4 hours PRN Agitation

## 2023-03-15 NOTE — PROGRESS NOTE ADULT - NUTRITIONAL ASSESSMENT
This patient has been assessed with a concern for Malnutrition and has been determined to have a diagnosis/diagnoses of Severe protein-calorie malnutrition and Underweight (BMI < 19).    This patient is being managed with:   Diet NPO-  Except Medications  Entered: Mar 12 2023  4:11PM

## 2023-03-15 NOTE — PROGRESS NOTE ADULT - ASSESSMENT
This is a  87 yo F from home, lives with , bedbound with 24hr HHA PMHx of Parkinson's disease, dementia, HTN, HLD presenting to the ED for worsening mental status admitted for AHRF 2/2 Rhinovirus

## 2023-03-15 NOTE — DISCHARGE NOTE PROVIDER - HOSPITAL COURSE
This is a  87 yo F from home, lives with , bedbound with 24hr HHA PMHx of Parkinson's disease, dementia, HTN, HLD presenting to the ED for worsening mental status.  and daughter at bedside providing collateral, state that patient's mental status appears to be worse and she hasn't been eating much,   noticed that today she was bringing up phlegm and she appeared to have difficulty swallowing. Family deny that she has had a cough. They deny any sick contacts, and she hasn't had any fevers or chills. Found to have rhino enterovirus on RVP. Admitted to medicine for  Sepsis with acute hypoxic respiratory failure. Pt w/ sepsis (HR 95, WBC 15), and AHRF on 3L NC. CXR and CTA showing no consolidations Enterovirus +. Started on Azithro and CTX eventually discontinued as BCx showed no growth. Strep PNA, and legionella negative. Pulm consulted Dr. Sotomayor. Started chest PT and hypertonic saline. Patient remained non-verbal, minimally responsive to verbal and tactile stimuli; and failed speech and swallow evaluation and was kept NPO. Electrolytes replaced with IVF. Patient had rhonci bilaterally and increased work of breathing with pursed lips. Palliative care was consulted who Met with the pt's spouse Gerardo and her daughter Kendra Jones  at the bedside, discussed her current clinical condition and goals of care. Explained that swallowing difficulties is common in advancing dementia. The person may have a weak swallow or lose the ability to swallow safely.  Pt with advanced dementia food and fluid intake tends to decrease slowly over time. Discussed the risk/ benefits of artificial nutrition.  PEG does not optimize pt's life but rather prolongs suffering.  Given her decline, they do not want her to suffer anymore and wish to focus on comfort measures only. Educated them about hospice philosophy, services , and locations provided.  They agreed for pt to be discharged home with [facility], they already have 24/7 HHA in place to support pt. Discussed risks/benefits of LST such as CPR/ intubation, PEG in the context of advanced dementia and debility. Family aware these invasive measures will not optimize the pt's life but may cause more stress and pain. NATALIYA drafted DNR/DNI/no feeding tube/comfort measures only/DNH. Chaplaincy offered and deferred at the time. Palliative followed up the next day as patients clinical condition appeared to have worsened. Spoke with the pt's daughter Kendra via phone, explained that the pt condition has worsened with increased work of breathing requiring increased oxygen support.  She is appropriate for inpatient hospice for symptom management with IV medications.  Kendra stated she needs to seethe pt before making any decisions.  DNR/DNI on file. Later met with pt's daughter at the bedside, she stated she and her family are in agreement for inpatient hospice for symptom management with IV medications. Chaplaincy offered and accepted.     Patient is ready for discharge to inpatient hospice and is advised to follow up with PCP as outpatient.  Please refer to patient's complete medical chart with documents for a full hospital course, for this is only a brief summary.   This is a  87 yo F from home, lives with , bedbound with 24hr HHA PMHx of Parkinson's disease, dementia, HTN, HLD presenting to the ED for worsening mental status.  and daughter at bedside providing collateral, state that patient's mental status appears to be worse and she hasn't been eating much,   noticed that today she was bringing up phlegm and she appeared to have difficulty swallowing. Family deny that she has had a cough. They deny any sick contacts, and she hasn't had any fevers or chills. Found to have rhino enterovirus on RVP. Admitted to medicine for  Sepsis with acute hypoxic respiratory failure. Pt w/ sepsis (HR 95, WBC 15), and AHRF on 3L NC. CXR and CTA showing no consolidations Enterovirus +. Started on Azithro and CTX eventually discontinued as BCx showed no growth. Strep PNA, and legionella negative. Pulm consulted Dr. Sotomayor. Started chest PT and hypertonic saline. Patient remained non-verbal, minimally responsive to verbal and tactile stimuli; and failed speech and swallow evaluation and was kept NPO. Electrolytes replaced with IVF. Patient had rhonci bilaterally and increased work of breathing with pursed lips. Palliative care was consulted who Met with the pt's spouse Gerardo and her daughter Kendra Jones  at the bedside, discussed her current clinical condition and goals of care. Explained that swallowing difficulties is common in advancing dementia. The person may have a weak swallow or lose the ability to swallow safely.  Pt with advanced dementia food and fluid intake tends to decrease slowly over time. Discussed the risk/ benefits of artificial nutrition.  PEG does not optimize pt's life but rather prolongs suffering.  Given her decline, they do not want her to suffer anymore and wish to focus on comfort measures only. Educated them about hospice philosophy, services , and locations provided.  They agreed for pt to be discharged home with [facility], they already have 24/7 HHA in place to support pt. Discussed risks/benefits of LST such as CPR/ intubation, PEG in the context of advanced dementia and debility. Family aware these invasive measures will not optimize the pt's life but may cause more stress and pain. NATALIYA drafted DNR/DNI/no feeding tube/comfort measures only/DNH. Chaplaincy offered and deferred at the time. Palliative followed up the next day as patients clinical condition appeared to have worsened. Spoke with the pt's daughter Kendra via phone, explained that the pt condition has worsened with increased work of breathing requiring increased oxygen support.  She is appropriate for inpatient hospice for symptom management with IV medications.  Kendra stated she needs to seethe pt before making any decisions.  DNR/DNI on file. Later met with pt's daughter at the bedside, she stated she and her family are in agreement for inpatient hospice for symptom management with IV medications. Chaplaincy offered and accepted.     Patient is ready for discharge to inpatient hospice.  Please refer to patient's complete medical chart with documents for a full hospital course, for this is only a brief summary.

## 2023-03-15 NOTE — PATIENT PROFILE ADULT - FUNCTIONAL ASSESSMENT - BASIC MOBILITY 6.
1-calculated by average/Not able to assess (calculate score using [facility] averaging method)

## 2023-03-15 NOTE — H&P ADULT - PROBLEM SELECTOR PLAN 3
Advanced dementia.  AOX0, non verbal, bedbound.  Total care.  FAST 7d.  not arousable anymore  prognosis likely days

## 2023-03-15 NOTE — PROGRESS NOTE ADULT - PROBLEM SELECTOR PLAN 3
Pt w/ chronic constipation  c/w bisacodyl PRN
-Glycopyrrolate prn for secretions

## 2023-03-15 NOTE — PROGRESS NOTE ADULT - PROBLEM SELECTOR PLAN 6
Pt more inpatient appropriate for symptom management. Eligible for continued inpatient hospice care admission due to ongoing active symptom management with IV medications. Patient is unstable for discharge home or nursing, acute symptom management cannot be managed outside of inpatient hospice unit at this time  Bowel regimen with Dulcolax suppository PRN  for constipation  Robinul IVP PRN for Oral secretions management   Tylenol suppository PRN for fever  oral hygiene  Comfort care  Palliative/Hospice education and counseling to family/caregivers

## 2023-03-15 NOTE — DISCHARGE NOTE PROVIDER - DETAILS OF MALNUTRITION DIAGNOSIS/DIAGNOSES
This patient has been assessed with a concern for Malnutrition and was treated during this hospitalization for the following Nutrition diagnosis/diagnoses:     -  03/15/2023: Severe protein-calorie malnutrition   -  03/15/2023: Underweight (BMI < 19)

## 2023-03-15 NOTE — H&P ADULT - PROBLEM SELECTOR PLAN 1
likely 2/2 aspiration pna  IWOB, pursed lip breathing. on 4L NC   appropriate for inpatient hospice for IV symptom management.   -Morphine 2 mg IVP q2prn  -Ativan prn for agitation  supportive care only, no abx

## 2023-03-15 NOTE — DIETITIAN INITIAL EVALUATION ADULT - ENERGY INTAKE
per Palliative consult: "Severe protein calorie malnutrition/ 3rd degree In context of  Chronic Illness (>1 month)  Energy/Food intake <50% of estimated energy requirement >5 days   Weight loss: Moderate  Body Fat loss: Severe   muscle atrophy  Muscle mass loss: Severe   Strength: weakened severe bedbound" from chart

## 2023-03-15 NOTE — DIETITIAN NUTRITION RISK NOTIFICATION - ADDITIONAL COMMENTS/DIETITIAN RECOMMENDATIONS
Malnutrition Diagnosis Parameters: Severe Malnutrition per MD; intake <50% needs x >5d; cachectic, BMI=18.2; debility

## 2023-03-16 NOTE — PROGRESS NOTE ADULT - PROBLEM SELECTOR PLAN 4
Eligible for continued inpatient hospice care admission due to ongoing active symptom management with IV medications  patient is unstable for discharge home or nursing home, acute symptom management cannot be managed outside of inpatient hospice unit at this time  Bowel regimen with Dulcolax suppository PRN  for constipation  Tylenol suppository PRN for fever  oral hygiene  Comfort care  Palliative/Hospice education and counseling to family/caregivers

## 2023-03-16 NOTE — PROGRESS NOTE ADULT - PROBLEM SELECTOR PLAN 1
likely 2/2 aspiration pna  IWOB, pursed lip breathing. on 4L NC   appropriate for inpatient hospice for IV symptom management.   -Morphine 2 mg IVP q2prn  -Ativan prn for agitation  supportive care only, no abx likely 2/2 aspiration pna  IWOB, pursed lip breathing. on 4L NC   appropriate for inpatient hospice for IV symptom management.   -Morphine 2 mg IVP q2prn, start morphine 2mg IVP q4 ATC  -Ativan prn for agitation  supportive care only, no abx

## 2023-03-16 NOTE — PROGRESS NOTE ADULT - SUBJECTIVE AND OBJECTIVE BOX
SALLY JOANA                    88y  Female    Allergies    No Known Allergies    Intolerances        [facility]:  Rose Cash DO: cell ([phone])  Toni Ro MD: cell ([phone])  Can contact via Microsoft Teams for any patient issues or concerns     Symptoms:  Pain (1-10):  Dyspnea:  Nausea/Vomiting:  Secretions:   Agitation:  Symptom Requiring Inpatient Hospice Admission:    Overnight events/interim history:    HPI:  This is a  87 yo F from home, lives with , bedbound with 24hr HHA PMHx of Parkinson's disease, dementia, HTN, HLD presenting to the ED for worsening mental status.  and daughter at bedside providing collateral, state that patient's mental status appears to be worse and she hasn't been eating much,   noticed that today she was bringing up phlegm and she appeared to have difficulty swallowing. Family deny that she has had a cough. They deny any sick contacts, and she hasn't had any fevers or chills. Found to have rhino enterovirus on RVP. Admitted to medicine for  Sepsis with acute hypoxic respiratory failure. Pt w/ sepsis (HR 95, WBC 15), and AHRF on 3L NC. CXR and CTA showing no consolidations Enterovirus +. Started on Azithro and CTX eventually discontinued as BCx showed no growth. Strep PNA, and legionella negative. Pulm consulted Dr. Sotomayor. Started chest PT and hypertonic saline. Patient remained non-verbal, minimally responsive to verbal and tactile stimuli; and failed speech and swallow evaluation and was kept NPO. Electrolytes replaced with IVF. Patient had rhonci bilaterally and increased work of breathing with pursed lips. Palliative care was consulted who Met with the pt's spouse Gerardo and her daughter Kendra Jones  at the bedside, discussed her current clinical condition and goals of care. Explained that swallowing difficulties is common in advancing dementia. The person may have a weak swallow or lose the ability to swallow safely.  Pt with advanced dementia food and fluid intake tends to decrease slowly over time. Discussed the risk/ benefits of artificial nutrition.  PEG does not optimize pt's life but rather prolongs suffering.  Given her decline, they do not want her to suffer anymore and wish to focus on comfort measures only. Educated them about hospice philosophy, services , and locations provided.  They agreed for pt to be discharged home with [facility], they already have 24/7 HHA in place to support pt. Discussed risks/benefits of LST such as CPR/ intubation, PEG in the context of advanced dementia and debility. Family aware these invasive measures will not optimize the pt's life but may cause more stress and pain. NATALIYA drafted DNR/DNI/no feeding tube/comfort measures only/DNH. Chaplaincy offered and deferred at the time. Palliative followed up the next day as patients clinical condition appeared to have worsened. Spoke with the pt's daughter Kendra via phone, explained that the pt condition has worsened with increased work of breathing requiring increased oxygen support.  She is appropriate for inpatient hospice for symptom management with IV medications for end of life care.  Kendra stated she needs to see the pt before making any decisions.  DNR/DNI on file. Later met with pt's daughter at the bedside, she stated she and her family are in agreement for inpatient hospice for symptom management with IV medications. Chaplaincy offered and accepted.    (15 Mar 2023 18:00)            MEDICATIONS  (STANDING):  scopolamine 1 mG/72 Hr(s) Patch 1 Patch Transdermal every 72 hours    MEDICATIONS  (PRN):  acetaminophen  Suppository .. 650 milliGRAM(s) Rectal every 6 hours PRN Temp greater or equal to 38C (100.4F), Mild Pain (1 - 3)  bisacodyl Suppository 10 milliGRAM(s) Rectal daily PRN Constipation  glycopyrrolate Injectable 0.4 milliGRAM(s) IV Push every 4 hours PRN Secretions  LORazepam   Injectable 1 milliGRAM(s) IV Push every 1 hour PRN Agitation  morphine  - Injectable 2 milliGRAM(s) IV Push every 2 hours PRN respiratory distress, labored breathing                             Vital Signs Last 24 Hrs  T(C): 36.4 (16 Mar 2023 05:13), Max: 38 (15 Mar 2023 13:18)  T(F): 97.5 (16 Mar 2023 05:13), Max: 100.4 (15 Mar 2023 13:18)  HR: 92 (16 Mar 2023 05:13) (92 - 114)  BP: 130/81 (16 Mar 2023 05:13) (94/50 - 130/81)  BP(mean): --  RR: 19 (16 Mar 2023 05:13) (18 - 20)  SpO2: 96% (16 Mar 2023 05:13) (96% - 98%)    Parameters below as of 16 Mar 2023 05:13  Patient On (Oxygen Delivery Method): nasal cannula  O2 Flow (L/min): 3      General: lethargic  Karnofsky Performance Score/Palliative Performance Status Version2:     %    HEENT: dry mouth    Lungs: agonal breathing  CV: RRR, S1S2, tachycardia  GI: soft non distended non tender     : incontinent    Musculoskeletal:  fully bedbound  Skin: poor skin turgor   Neuro: severe cognitive impairment s  Oral intake ability: unable   Code Status: DNR/DNI SALLY JOANA                    88y  Female    Allergies    No Known Allergies    Intolerances        [facility]:  Rose Cash DO: cell [phone])  Toni Ro MD: cell ([phone])  Can contact via Microsoft Teams for any patient issues or concerns     Symptoms:  Symptom Requiring Inpatient Hospice Admission: respiratory distress, agitation, secretions    Overnight events/interim history: agitated, labored breathing and only one morphine given    HPI:  This is a  89 yo F from home, lives with , bedbound with 24hr HHA PMHx of Parkinson's disease, dementia, HTN, HLD presenting to the ED for worsening mental status.  and daughter at bedside providing collateral, state that patient's mental status appears to be worse and she hasn't been eating much,   noticed that today she was bringing up phlegm and she appeared to have difficulty swallowing. Family deny that she has had a cough. They deny any sick contacts, and she hasn't had any fevers or chills. Found to have rhino enterovirus on RVP. Admitted to medicine for  Sepsis with acute hypoxic respiratory failure. Pt w/ sepsis (HR 95, WBC 15), and AHRF on 3L NC. CXR and CTA showing no consolidations Enterovirus +. Started on Azithro and CTX eventually discontinued as BCx showed no growth. Strep PNA, and legionella negative. Pulm consulted Dr. Sotomayor. Started chest PT and hypertonic saline. Patient remained non-verbal, minimally responsive to verbal and tactile stimuli; and failed speech and swallow evaluation and was kept NPO. Electrolytes replaced with IVF. Patient had rhonci bilaterally and increased work of breathing with pursed lips. Palliative care was consulted who Met with the pt's spouse Gerardo and her daughter Kendra Jones  at the bedside, discussed her current clinical condition and goals of care. Explained that swallowing difficulties is common in advancing dementia. The person may have a weak swallow or lose the ability to swallow safely.  Pt with advanced dementia food and fluid intake tends to decrease slowly over time. Discussed the risk/ benefits of artificial nutrition.  PEG does not optimize pt's life but rather prolongs suffering.  Given her decline, they do not want her to suffer anymore and wish to focus on comfort measures only. Educated them about hospice philosophy, services , and locations provided.  They agreed for pt to be discharged home with [facility], they already have 24/7 HHA in place to support pt. Discussed risks/benefits of LST such as CPR/ intubation, PEG in the context of advanced dementia and debility. Family aware these invasive measures will not optimize the pt's life but may cause more stress and pain. NATALIYA drafted DNR/DNI/no feeding tube/comfort measures only/DNH. Chaplaincy offered and deferred at the time. Palliative followed up the next day as patients clinical condition appeared to have worsened. Spoke with the pt's daughter Kendra via phone, explained that the pt condition has worsened with increased work of breathing requiring increased oxygen support.  She is appropriate for inpatient hospice for symptom management with IV medications for end of life care.  Kendra stated she needs to see the pt before making any decisions.  DNR/DNI on file. Later met with pt's daughter at the bedside, she stated she and her family are in agreement for inpatient hospice for symptom management with IV medications. Chaplaincy offered and accepted.    (15 Mar 2023 18:00)            MEDICATIONS  (STANDING):  scopolamine 1 mG/72 Hr(s) Patch 1 Patch Transdermal every 72 hours    MEDICATIONS  (PRN):  acetaminophen  Suppository .. 650 milliGRAM(s) Rectal every 6 hours PRN Temp greater or equal to 38C (100.4F), Mild Pain (1 - 3)  bisacodyl Suppository 10 milliGRAM(s) Rectal daily PRN Constipation  glycopyrrolate Injectable 0.4 milliGRAM(s) IV Push every 4 hours PRN Secretions  LORazepam   Injectable 1 milliGRAM(s) IV Push every 1 hour PRN Agitation  morphine  - Injectable 2 milliGRAM(s) IV Push every 2 hours PRN respiratory distress, labored breathing                             Vital Signs Last 24 Hrs  T(C): 36.4 (16 Mar 2023 05:13), Max: 38 (15 Mar 2023 13:18)  T(F): 97.5 (16 Mar 2023 05:13), Max: 100.4 (15 Mar 2023 13:18)  HR: 92 (16 Mar 2023 05:13) (92 - 114)  BP: 130/81 (16 Mar 2023 05:13) (94/50 - 130/81)  BP(mean): --  RR: 19 (16 Mar 2023 05:13) (18 - 20)  SpO2: 96% (16 Mar 2023 05:13) (96% - 98%)    Parameters below as of 16 Mar 2023 05:13  Patient On (Oxygen Delivery Method): nasal cannula  O2 Flow (L/min): 3      General: lethargic  Karnofsky Performance Score/Palliative Performance Status Version2:  10  %    HEENT: dry mouth    Lungs: agonal breathing  CV: RRR, S1S2, tachycardia  GI: soft non distended non tender     : incontinent    Musculoskeletal:  fully bedbound  Skin: poor skin turgor   Neuro: severe cognitive impairment s  Oral intake ability: unable   Code Status: DNR/DNI

## 2023-03-17 NOTE — PROGRESS NOTE ADULT - PROBLEM SELECTOR PLAN 1
likely 2/2 aspiration pna  IWOB, pursed lip breathing. on 4L NC   appropriate for inpatient hospice for IV symptom management.   -Morphine 2 mg IVP q2prn, start morphine 2mg IVP q4 ATC  -Ativan prn for agitation  supportive care only, no abx

## 2023-03-17 NOTE — PROGRESS NOTE ADULT - SUBJECTIVE AND OBJECTIVE BOX
SALLY JOANA                    88y  Female    Allergies    No Known Allergies    Intolerances        [facility]:  Rose Cash DO: cell ([phone])  Toni Ro MD: cell ([phone])  Can contact via Microsoft Teams for any patient issues or concerns     Symptoms:  Pain (1-10):  Dyspnea:  Nausea/Vomiting:  Secretions:   Agitation:  Symptom Requiring Inpatient Hospice Admission:    Overnight events/interim history:    HPI:  This is a  89 yo F from home, lives with , bedbound with 24hr HHA PMHx of Parkinson's disease, dementia, HTN, HLD presenting to the ED for worsening mental status.  and daughter at bedside providing collateral, state that patient's mental status appears to be worse and she hasn't been eating much,   noticed that today she was bringing up phlegm and she appeared to have difficulty swallowing. Family deny that she has had a cough. They deny any sick contacts, and she hasn't had any fevers or chills. Found to have rhino enterovirus on RVP. Admitted to medicine for  Sepsis with acute hypoxic respiratory failure. Pt w/ sepsis (HR 95, WBC 15), and AHRF on 3L NC. CXR and CTA showing no consolidations Enterovirus +. Started on Azithro and CTX eventually discontinued as BCx showed no growth. Strep PNA, and legionella negative. Pulm consulted Dr. Sotomayor. Started chest PT and hypertonic saline. Patient remained non-verbal, minimally responsive to verbal and tactile stimuli; and failed speech and swallow evaluation and was kept NPO. Electrolytes replaced with IVF. Patient had rhonci bilaterally and increased work of breathing with pursed lips. Palliative care was consulted who Met with the pt's spouse Gerardo and her daughter Kendra Jones  at the bedside, discussed her current clinical condition and goals of care. Explained that swallowing difficulties is common in advancing dementia. The person may have a weak swallow or lose the ability to swallow safely.  Pt with advanced dementia food and fluid intake tends to decrease slowly over time. Discussed the risk/ benefits of artificial nutrition.  PEG does not optimize pt's life but rather prolongs suffering.  Given her decline, they do not want her to suffer anymore and wish to focus on comfort measures only. Educated them about hospice philosophy, services , and locations provided.  They agreed for pt to be discharged home with [facility], they already have 24/7 HHA in place to support pt. Discussed risks/benefits of LST such as CPR/ intubation, PEG in the context of advanced dementia and debility. Family aware these invasive measures will not optimize the pt's life but may cause more stress and pain. NATALIYA drafted DNR/DNI/no feeding tube/comfort measures only/DNH. Chaplaincy offered and deferred at the time. Palliative followed up the next day as patients clinical condition appeared to have worsened. Spoke with the pt's daughter Kendra via phone, explained that the pt condition has worsened with increased work of breathing requiring increased oxygen support.  She is appropriate for inpatient hospice for symptom management with IV medications for end of life care.  Kendra stated she needs to see the pt before making any decisions.  DNR/DNI on file. Later met with pt's daughter at the bedside, she stated she and her family are in agreement for inpatient hospice for symptom management with IV medications. Chaplaincy offered and accepted.    (15 Mar 2023 18:00)            MEDICATIONS  (STANDING):  morphine  - Injectable 2 milliGRAM(s) IV Push every 4 hours  scopolamine 1 mG/72 Hr(s) Patch 1 Patch Transdermal every 72 hours    MEDICATIONS  (PRN):  acetaminophen  Suppository .. 650 milliGRAM(s) Rectal every 6 hours PRN Temp greater or equal to 38C (100.4F), Mild Pain (1 - 3)  bisacodyl Suppository 10 milliGRAM(s) Rectal daily PRN Constipation  glycopyrrolate Injectable 0.4 milliGRAM(s) IV Push every 4 hours PRN Secretions  LORazepam   Injectable 1 milliGRAM(s) IV Push every 1 hour PRN Agitation                             Vital Signs Last 24 Hrs  T(C): 37.1 (17 Mar 2023 05:22), Max: 37.1 (17 Mar 2023 05:22)  T(F): 98.8 (17 Mar 2023 05:22), Max: 98.8 (17 Mar 2023 05:22)  HR: 100 (17 Mar 2023 05:22) (100 - 104)  BP: 147/83 (17 Mar 2023 05:22) (123/75 - 155/82)  BP(mean): --  RR: 20 (17 Mar 2023 05:22) (19 - 20)  SpO2: 100% (17 Mar 2023 05:22) (95% - 100%)    Parameters below as of 17 Mar 2023 05:22  Patient On (Oxygen Delivery Method): nasal cannula  O2 Flow (L/min): 2    General: lethargic  Karnofsky Performance Score/Palliative Performance Status Version2:  10  %    HEENT: dry mouth    Lungs: agonal breathing  CV: RRR, S1S2, tachycardia  GI: soft non distended non tender     : incontinent    Musculoskeletal:  fully bedbound  Skin: poor skin turgor   Neuro: severe cognitive impairment  Oral intake ability: unable   Code Status: DNR/DNI   SALLY JOANA                    88y  Female    Allergies    No Known Allergies    Intolerances        [facility]:  Rose Cash DO: cell ([phone])  Toni Ro MD: cell ([phone])  Can contact via Microsoft Teams for any patient issues or concerns     Symptoms:  Pain (1-10):  Dyspnea:  Nausea/Vomiting:  Secretions:   Agitation:  Symptom Requiring Inpatient Hospice Admission:    Overnight events/interim history:  Worsening dyspnea/discomfort yesterday 3/16 requiring change in meds to IV morphine Q 4hrs ATC  PRN IV glycopyrrolate given x 2 doses 3/16, x 3 so far today      HPI:  This is a  87 yo F from home, lives with , bedbound with 24hr HHA PMHx of Parkinson's disease, dementia, HTN, HLD presenting to the ED for worsening mental status.  and daughter at bedside providing collateral, state that patient's mental status appears to be worse and she hasn't been eating much,   noticed that today she was bringing up phlegm and she appeared to have difficulty swallowing. Family deny that she has had a cough. They deny any sick contacts, and she hasn't had any fevers or chills. Found to have rhino enterovirus on RVP. Admitted to medicine for  Sepsis with acute hypoxic respiratory failure. Pt w/ sepsis (HR 95, WBC 15), and AHRF on 3L NC. CXR and CTA showing no consolidations Enterovirus +. Started on Azithro and CTX eventually discontinued as BCx showed no growth. Strep PNA, and legionella negative. Pulm consulted Dr. Sotomayor. Started chest PT and hypertonic saline. Patient remained non-verbal, minimally responsive to verbal and tactile stimuli; and failed speech and swallow evaluation and was kept NPO. Electrolytes replaced with IVF. Patient had rhonci bilaterally and increased work of breathing with pursed lips. Palliative care was consulted who Met with the pt's spouse Gerardo and her daughter Kendra Jones  at the bedside, discussed her current clinical condition and goals of care. Explained that swallowing difficulties is common in advancing dementia. The person may have a weak swallow or lose the ability to swallow safely.  Pt with advanced dementia food and fluid intake tends to decrease slowly over time. Discussed the risk/ benefits of artificial nutrition.  PEG does not optimize pt's life but rather prolongs suffering.  Given her decline, they do not want her to suffer anymore and wish to focus on comfort measures only. Educated them about hospice philosophy, services , and locations provided.  They agreed for pt to be discharged home with [facility], they already have 24/7 HHA in place to support pt. Discussed risks/benefits of LST such as CPR/ intubation, PEG in the context of advanced dementia and debility. Family aware these invasive measures will not optimize the pt's life but may cause more stress and pain. NATALIYA drafted DNR/DNI/no feeding tube/comfort measures only/DNH. Chaplaincy offered and deferred at the time. Palliative followed up the next day as patients clinical condition appeared to have worsened. Spoke with the pt's daughter Kendra via phone, explained that the pt condition has worsened with increased work of breathing requiring increased oxygen support.  She is appropriate for inpatient hospice for symptom management with IV medications for end of life care.  Kendra stated she needs to see the pt before making any decisions.  DNR/DNI on file. Later met with pt's daughter at the bedside, she stated she and her family are in agreement for inpatient hospice for symptom management with IV medications. Chaplaincy offered and accepted.    (15 Mar 2023 18:00)            MEDICATIONS  (STANDING):  morphine  - Injectable 2 milliGRAM(s) IV Push every 4 hours  scopolamine 1 mG/72 Hr(s) Patch 1 Patch Transdermal every 72 hours    MEDICATIONS  (PRN):  acetaminophen  Suppository .. 650 milliGRAM(s) Rectal every 6 hours PRN Temp greater or equal to 38C (100.4F), Mild Pain (1 - 3)  bisacodyl Suppository 10 milliGRAM(s) Rectal daily PRN Constipation  glycopyrrolate Injectable 0.4 milliGRAM(s) IV Push every 4 hours PRN Secretions  LORazepam   Injectable 1 milliGRAM(s) IV Push every 1 hour PRN Agitation                             Vital Signs Last 24 Hrs  T(C): 37.1 (17 Mar 2023 05:22), Max: 37.1 (17 Mar 2023 05:22)  T(F): 98.8 (17 Mar 2023 05:22), Max: 98.8 (17 Mar 2023 05:22)  HR: 100 (17 Mar 2023 05:22) (100 - 104)  BP: 147/83 (17 Mar 2023 05:22) (123/75 - 155/82)  BP(mean): --  RR: 20 (17 Mar 2023 05:22) (19 - 20)  SpO2: 100% (17 Mar 2023 05:22) (95% - 100%)    Parameters below as of 17 Mar 2023 05:22  Patient On (Oxygen Delivery Method): nasal cannula  O2 Flow (L/min): 2    General: lethargic  Karnofsky Performance Score/Palliative Performance Status Version2:  10  %    HEENT: dry mouth    Lungs: agonal breathing  CV: RRR, S1S2, tachycardia  GI: soft non distended non tender     : incontinent    Musculoskeletal:  fully bedbound  Skin: poor skin turgor   Neuro: severe cognitive impairment  Oral intake ability: unable   Code Status: DNR/DNI

## 2023-03-18 NOTE — PROGRESS NOTE ADULT - SUBJECTIVE AND OBJECTIVE BOX
JOANA ZAVALA                    88y  Female    Allergies  No Known Allergies  Intolerances      [facility] Palliative Consult Service:  Rose Cash DO: cell ([phone])  Yulia Llamas MD: cell ([phone])   Can contact via [employer] Teams for any patient issues or concerns     Symptoms: unable to obtain due to mental status: pt actively dying, unresponsive to verbal stimuli   Pain (1-10):   Dyspnea:  Nausea/Vomiting:  Secretions:   Agitation:  Symptom Requiring Inpatient Hospice Admission: pain and shortness of breath     Overnight events/interim history: pt now with scheduled ms q4 ATC with improvement in respiratory distress per RN. no unexpected side effects of opioids noted.  PRN iv glyco administered x4 in the last 24s.     family at bedside grieving appropriately, ongoing support and EOL education provided     HPI:  This is a  87 yo F from home, lives with , bedbound with 24hr HHA PMHx of Parkinson's disease, dementia, HTN, HLD presenting to the ED for worsening mental status.  and daughter at bedside providing collateral, state that patient's mental status appears to be worse and she hasn't been eating much,   noticed that today she was bringing up phlegm and she appeared to have difficulty swallowing. Family deny that she has had a cough. They deny any sick contacts, and she hasn't had any fevers or chills. Found to have rhino enterovirus on RVP. Admitted to medicine for  Sepsis with acute hypoxic respiratory failure. Pt w/ sepsis (HR 95, WBC 15), and AHRF on 3L NC. CXR and CTA showing no consolidations Enterovirus +. Started on Azithro and CTX eventually discontinued as BCx showed no growth. Strep PNA, and legionella negative. Pulm consulted Dr. Sotomayor. Started chest PT and hypertonic saline. Patient remained non-verbal, minimally responsive to verbal and tactile stimuli; and failed speech and swallow evaluation and was kept NPO. Electrolytes replaced with IVF. Patient had rhonci bilaterally and increased work of breathing with pursed lips. Palliative care was consulted who Met with the pt's spouse Gerardo and her daughter Kendra Jones  at the bedside, discussed her current clinical condition and goals of care. Explained that swallowing difficulties is common in advancing dementia. The person may have a weak swallow or lose the ability to swallow safely.  Pt with advanced dementia food and fluid intake tends to decrease slowly over time. Discussed the risk/ benefits of artificial nutrition.  PEG does not optimize pt's life but rather prolongs suffering.  Given her decline, they do not want her to suffer anymore and wish to focus on comfort measures only. Educated them about hospice philosophy, services , and locations provided.  They agreed for pt to be discharged home with [facility], they already have 24/7 HHA in place to support pt. Discussed risks/benefits of LST such as CPR/ intubation, PEG in the context of advanced dementia and debility. Family aware these invasive measures will not optimize the pt's life but may cause more stress and pain. NATALIYA drafted DNR/DNI/no feeding tube/comfort measures only/DNH. Chaplaincy offered and deferred at the time. Palliative followed up the next day as patients clinical condition appeared to have worsened. Spoke with the pt's daughter Kendra via phone, explained that the pt condition has worsened with increased work of breathing requiring increased oxygen support.  She is appropriate for inpatient hospice for symptom management with IV medications for end of life care.  Kendra stated she needs to see the pt before making any decisions.  DNR/DNI on file. Later met with pt's daughter at the bedside, she stated she and her family are in agreement for inpatient hospice for symptom management with IV medications. Chaplaincy offered and accepted.    (15 Mar 2023 18:00)            MEDICATIONS  (STANDING):  morphine  - Injectable 2 milliGRAM(s) IV Push every 4 hours  scopolamine 1 mG/72 Hr(s) Patch 1 Patch Transdermal every 72 hours    MEDICATIONS  (PRN):  acetaminophen  Suppository .. 650 milliGRAM(s) Rectal every 6 hours PRN Temp greater or equal to 38C (100.4F), Mild Pain (1 - 3)  bisacodyl Suppository 10 milliGRAM(s) Rectal daily PRN Constipation  glycopyrrolate Injectable 0.4 milliGRAM(s) IV Push every 4 hours PRN Secretions  LORazepam   Injectable 1 milliGRAM(s) IV Push every 1 hour PRN Agitation                             Vital Signs Last 24 Hrs  T(C): 36.6 (18 Mar 2023 04:58), Max: 36.7 (17 Mar 2023 14:16)  T(F): 97.8 (18 Mar 2023 04:58), Max: 98 (17 Mar 2023 14:16)  HR: 86 (18 Mar 2023 04:58) (86 - 102)  BP: 125/77 (18 Mar 2023 04:58) (110/68 - 149/91)  BP(mean): --  RR: 18 (18 Mar 2023 04:58) (18 - 18)  SpO2: 100% (18 Mar 2023 04:58) (96% - 100%)    Parameters below as of 18 Mar 2023 04:58  Patient On (Oxygen Delivery Method): nasal cannula  O2 Flow (L/min): 3      General: unarousable elderly woman lying in bed oriented x0    Karnofsky Performance Score/Palliative Performance Status Version2:     20%    HEENT: + dry mouth    Lungs: No tachypnea/labored breathing  +excessive secretions  CV: RRR, S1S2, tachycardia  GI: soft non distended non tender  incontinent  : incontinent  Musculoskeletal: functional quad, fully bedbound  Skin: poor skin turgor, generalized pallor   Neuro: severe cognitive impairment   Oral intake ability: unable  Code Status: DNR/DNI

## 2023-03-18 NOTE — PROGRESS NOTE ADULT - PROBLEM SELECTOR PLAN 1
likely 2/2 aspiration pna  IWOB, pursed lip breathing resolved with scheduled MS ATC. no nonverbal signs or symptoms of pain or respiratory distress. on 4L NC  continues to be appropriate for inpatient hospice for IV symptom management.   -cw morphine 2 mg IVP q2prn  -cw morphine 2mg IVP q4 ATC  - Low threshold to repeat dose or increase PRN frequency as needed to maximize comfort through the end of the patient's life  -Ativan prn for agitation  -supportive care only, no abx

## 2023-03-18 NOTE — PROGRESS NOTE ADULT - PROBLEM SELECTOR PLAN 4
Patient continues to require GIP level of care for terminal respiratory failure and dyspnea requiring ongoing titration of parenteral opioids.  Her symptoms are unable to be managed in any other setting.   patient is unstable for discharge home or nursing home, acute symptom management cannot be managed outside of inpatient hospice unit at this time  Bowel regimen with Dulcolax suppository PRN  for constipation  Tylenol suppository PRN for fever  oral hygiene  Comfort care  Palliative/Hospice education and counseling to family/caregivers

## 2023-03-20 NOTE — PROGRESS NOTE ADULT - SUBJECTIVE AND OBJECTIVE BOX
SALLY JOANA                    88y  Female    Allergies    No Known Allergies    Intolerances        [facility] Service:  Rose Cash DO: cell ([phone])  Toni Ro MD: cell ([phone])  Can contact via Microsoft Teams for any patient issues or concerns     Symptoms:  Pain (1-10):  Dyspnea:  Nausea/Vomiting:  Secretions:   Agitation:  Symptom Requiring Inpatient Hospice Admission:    Overnight events/interim history:    HPI:  This is a  89 yo F from home, lives with , bedbound with 24hr HHA PMHx of Parkinson's disease, dementia, HTN, HLD presenting to the ED for worsening mental status.  and daughter at bedside providing collateral, state that patient's mental status appears to be worse and she hasn't been eating much,   noticed that today she was bringing up phlegm and she appeared to have difficulty swallowing. Family deny that she has had a cough. They deny any sick contacts, and she hasn't had any fevers or chills. Found to have rhino enterovirus on RVP. Admitted to medicine for  Sepsis with acute hypoxic respiratory failure. Pt w/ sepsis (HR 95, WBC 15), and AHRF on 3L NC. CXR and CTA showing no consolidations Enterovirus +. Started on Azithro and CTX eventually discontinued as BCx showed no growth. Strep PNA, and legionella negative. Pulm consulted Dr. Sotomayor. Started chest PT and hypertonic saline. Patient remained non-verbal, minimally responsive to verbal and tactile stimuli; and failed speech and swallow evaluation and was kept NPO. Electrolytes replaced with IVF. Patient had rhonci bilaterally and increased work of breathing with pursed lips. Palliative care was consulted who Met with the pt's spouse Gerardo and her daughter Kendra Jones  at the bedside, discussed her current clinical condition and goals of care. Explained that swallowing difficulties is common in advancing dementia. The person may have a weak swallow or lose the ability to swallow safely.  Pt with advanced dementia food and fluid intake tends to decrease slowly over time. Discussed the risk/ benefits of artificial nutrition.  PEG does not optimize pt's life but rather prolongs suffering.  Given her decline, they do not want her to suffer anymore and wish to focus on comfort measures only. Educated them about hospice philosophy, services , and locations provided.  They agreed for pt to be discharged home with [facility], they already have 24/7 HHA in place to support pt. Discussed risks/benefits of LST such as CPR/ intubation, PEG in the context of advanced dementia and debility. Family aware these invasive measures will not optimize the pt's life but may cause more stress and pain. NATALIYA drafted DNR/DNI/no feeding tube/comfort measures only/DNH. Chaplaincy offered and deferred at the time. Palliative followed up the next day as patients clinical condition appeared to have worsened. Spoke with the pt's daughter Kendra via phone, explained that the pt condition has worsened with increased work of breathing requiring increased oxygen support.  She is appropriate for inpatient hospice for symptom management with IV medications for end of life care.  Kendra stated she needs to see the pt before making any decisions.  DNR/DNI on file. Later met with pt's daughter at the bedside, she stated she and her family are in agreement for inpatient hospice for symptom management with IV medications. Chaplaincy offered and accepted.    (15 Mar 2023 18:00)    Pt appears comfortable. Family at bedside.          MEDICATIONS  (STANDING):  morphine  - Injectable 2 milliGRAM(s) IV Push every 4 hours  scopolamine 1 mG/72 Hr(s) Patch 1 Patch Transdermal every 72 hours    MEDICATIONS  (PRN):  acetaminophen  Suppository .. 650 milliGRAM(s) Rectal every 6 hours PRN Temp greater or equal to 38C (100.4F), Mild Pain (1 - 3)  bisacodyl Suppository 10 milliGRAM(s) Rectal daily PRN Constipation  glycopyrrolate Injectable 0.4 milliGRAM(s) IV Push every 4 hours PRN Secretions  LORazepam   Injectable 1 milliGRAM(s) IV Push every 1 hour PRN Agitation  morphine  - Injectable 2 milliGRAM(s) IV Push every 1 hour PRN respiratory distress                             Vital Signs Last 24 Hrs  T(C): 36.5 (20 Mar 2023 05:17), Max: 36.8 (19 Mar 2023 14:15)  T(F): 97.7 (20 Mar 2023 05:17), Max: 98.3 (19 Mar 2023 14:15)  HR: 91 (20 Mar 2023 05:17) (70 - 93)  BP: 111/59 (20 Mar 2023 05:17) (111/59 - 121/65)  BP(mean): --  RR: 18 (20 Mar 2023 05:17) (16 - 18)  SpO2: 98% (20 Mar 2023 05:17) (97% - 98%)    Parameters below as of 20 Mar 2023 05:17  Patient On (Oxygen Delivery Method): nasal cannula  O2 Flow (L/min): 3        General: unarousable elderly woman lying in bed  Karnofsky Performance Score/Palliative Performance Status Version2: 20%    HEENT: (+) dry mucous membranes  Lungs: No tachypnea/labored breathing,  +excessive secretions  CV: RRR, S1S  GI: soft non distended non tender  incontinent  : incontinent  Musculoskeletal: unable to move extremities, b/l UE in flexion, fully bedbound  Skin: poor skin turgor, generalized pallor   Neuro: severe cognitive impairment   Oral intake ability: unable  Code Status: DNR/DNI   SALLY JOANA                    88y  Female    Allergies    No Known Allergies    Intolerances        [facility] Service:  Rose Cash DO: cell ([phone])  Toni Ro MD: cell ([phone])  Can contact via [employer] Teams for any patient issues or concerns     Symptoms:  Pain (1-10):  Dyspnea:  Nausea/Vomiting:  Secretions:   Agitation:  Symptom Requiring Inpatient Hospice Admission:    Overnight events/interim history: No overnight events noted  Continued respiratory congestion/secretions, required PRN IV glycopyrrolate x 5 on 3/19, x 2 so far today  Received additional IV morphine 2 mg x 2 PRN doses over last 24 hours    HPI:  This is a  87 yo F from home, lives with , bedbound with 24hr HHA PMHx of Parkinson's disease, dementia, HTN, HLD presenting to the ED for worsening mental status.  and daughter at bedside providing collateral, state that patient's mental status appears to be worse and she hasn't been eating much,   noticed that today she was bringing up phlegm and she appeared to have difficulty swallowing. Family deny that she has had a cough. They deny any sick contacts, and she hasn't had any fevers or chills. Found to have rhino enterovirus on RVP. Admitted to medicine for  Sepsis with acute hypoxic respiratory failure. Pt w/ sepsis (HR 95, WBC 15), and AHRF on 3L NC. CXR and CTA showing no consolidations Enterovirus +. Started on Azithro and CTX eventually discontinued as BCx showed no growth. Strep PNA, and legionella negative. Pulm consulted Dr. Sotomayor. Started chest PT and hypertonic saline. Patient remained non-verbal, minimally responsive to verbal and tactile stimuli; and failed speech and swallow evaluation and was kept NPO. Electrolytes replaced with IVF. Patient had rhonci bilaterally and increased work of breathing with pursed lips. Palliative care was consulted who Met with the pt's spouse Gerardo and her daughter Kendra Jones  at the bedside, discussed her current clinical condition and goals of care. Explained that swallowing difficulties is common in advancing dementia. The person may have a weak swallow or lose the ability to swallow safely.  Pt with advanced dementia food and fluid intake tends to decrease slowly over time. Discussed the risk/ benefits of artificial nutrition.  PEG does not optimize pt's life but rather prolongs suffering.  Given her decline, they do not want her to suffer anymore and wish to focus on comfort measures only. Educated them about hospice philosophy, services , and locations provided.  They agreed for pt to be discharged home with [facility], they already have 24/7 HHA in place to support pt. Discussed risks/benefits of LST such as CPR/ intubation, PEG in the context of advanced dementia and debility. Family aware these invasive measures will not optimize the pt's life but may cause more stress and pain. NATALIYA drafted DNR/DNI/no feeding tube/comfort measures only/DNH. Chaplaincy offered and deferred at the time. Palliative followed up the next day as patients clinical condition appeared to have worsened. Spoke with the pt's daughter Kendra via phone, explained that the pt condition has worsened with increased work of breathing requiring increased oxygen support.  She is appropriate for inpatient hospice for symptom management with IV medications for end of life care.  Kendra stated she needs to see the pt before making any decisions.  DNR/DNI on file. Later met with pt's daughter at the bedside, she stated she and her family are in agreement for inpatient hospice for symptom management with IV medications. Chaplaincy offered and accepted.    (15 Mar 2023 18:00)    Symptom control appears improved today.  Noticeably less SOB/respiratory distress.   Family at bedside.          MEDICATIONS  (STANDING):  morphine  - Injectable 2 milliGRAM(s) IV Push every 4 hours  scopolamine 1 mG/72 Hr(s) Patch 1 Patch Transdermal every 72 hours    MEDICATIONS  (PRN):  acetaminophen  Suppository .. 650 milliGRAM(s) Rectal every 6 hours PRN Temp greater or equal to 38C (100.4F), Mild Pain (1 - 3)  bisacodyl Suppository 10 milliGRAM(s) Rectal daily PRN Constipation  glycopyrrolate Injectable 0.4 milliGRAM(s) IV Push every 4 hours PRN Secretions  LORazepam   Injectable 1 milliGRAM(s) IV Push every 1 hour PRN Agitation  morphine  - Injectable 2 milliGRAM(s) IV Push every 1 hour PRN respiratory distress                             Vital Signs Last 24 Hrs  T(C): 36.5 (20 Mar 2023 05:17), Max: 36.8 (19 Mar 2023 14:15)  T(F): 97.7 (20 Mar 2023 05:17), Max: 98.3 (19 Mar 2023 14:15)  HR: 91 (20 Mar 2023 05:17) (70 - 93)  BP: 111/59 (20 Mar 2023 05:17) (111/59 - 121/65)  BP(mean): --  RR: 18 (20 Mar 2023 05:17) (16 - 18)  SpO2: 98% (20 Mar 2023 05:17) (97% - 98%)    Parameters below as of 20 Mar 2023 05:17  Patient On (Oxygen Delivery Method): nasal cannula  O2 Flow (L/min): 3        General: unarousable elderly woman lying in bed  Karnofsky Performance Score/Palliative Performance Status Version2: 20%    HEENT: (+) dry mucous membranes  Lungs: No tachypnea/labored breathing,  +excessive secretions  CV: RRR, S1S  GI: soft non distended non tender  incontinent  : incontinent  Musculoskeletal: unable to move extremities, b/l UE in flexion, fully bedbound  Skin: poor skin turgor, generalized pallor   Neuro: severe cognitive impairment   Oral intake ability: unable  Code Status: DNR/DNI

## 2023-03-20 NOTE — PROGRESS NOTE ADULT - PROBLEM SELECTOR PLAN 4
Patient continues to require GIP level of care for terminal respiratory failure and dyspnea requiring ongoing titration of parenteral opioids.  Her symptoms are unable to be managed in any other setting.   patient is unstable for discharge home or nursing home, acute symptom management cannot be managed outside of inpatient hospice unit at this time  Bowel regimen with Dulcolax suppository PRN  for constipation  Tylenol suppository PRN for fever  oral hygiene  Comfort care  Palliative/Hospice education and counseling to family/caregivers Patient continues to require GIP level of care for terminal respiratory failure and dyspnea requiring ongoing titration of parenteral medications.  Her symptoms are unable to be managed in any other setting.   patient is unstable for discharge home or nursing home, acute symptom management cannot be managed outside of inpatient hospice unit at this time  Bowel regimen with Dulcolax suppository PRN  for constipation  Tylenol suppository PRN for fever  oral hygiene  Comfort care  Palliative/Hospice education and counseling to family/caregivers

## 2023-03-21 NOTE — PROGRESS NOTE ADULT - SUBJECTIVE AND OBJECTIVE BOX
follow up on:  complex medical decision making related to goals of care    [facility] Geriatric and Palliative Consult Service:  Dr. Rose Cash: cell ([phone])  Dr. Yulia Llamas: cell ([phone])   Serg Williamson NP: cell ([phone])   Barbara Willis NP: cell ([phone])  Oracio Fitzgerald LMSW: cell ([phone])     OVERNIGHT EVENTS:    Present Symptoms:   Pain:   Fatigue:  Nausea:  Lack of Appetite:   SOB:  Depression:  Anxiety:  Review of Systems: [All others negative or Unable to obtain due to poor mentation]    MEDICATIONS  (STANDING):  morphine  - Injectable 2 milliGRAM(s) IV Push every 4 hours  scopolamine 1 mG/72 Hr(s) Patch 1 Patch Transdermal every 72 hours    MEDICATIONS  (PRN):  acetaminophen  Suppository .. 650 milliGRAM(s) Rectal every 6 hours PRN Temp greater or equal to 38C (100.4F), Mild Pain (1 - 3)  bisacodyl Suppository 10 milliGRAM(s) Rectal daily PRN Constipation  glycopyrrolate Injectable 0.4 milliGRAM(s) IV Push every 4 hours PRN Secretions  LORazepam   Injectable 1 milliGRAM(s) IV Push every 1 hour PRN Agitation  morphine  - Injectable 2 milliGRAM(s) IV Push every 1 hour PRN respiratory distress      PHYSICAL EXAM:  Vital Signs Last 24 Hrs  T(C): 37.1 (21 Mar 2023 05:09), Max: 37.1 (21 Mar 2023 05:09)  T(F): 98.7 (21 Mar 2023 05:09), Max: 98.7 (21 Mar 2023 05:09)  HR: 95 (21 Mar 2023 05:09) (95 - 97)  BP: 117/67 (21 Mar 2023 05:09) (104/66 - 117/67)  BP(mean): --  RR: 17 (21 Mar 2023 05:09) (17 - 18)  SpO2: 97% (20 Mar 2023 21:59) (97% - 97%)    Parameters below as of 21 Mar 2023 05:09  Patient On (Oxygen Delivery Method): nasal cannula  O2 Flow (L/min): 2      General: lethargic nonverbal  unarousable     Palliative Performance Scale/Karnofsky Score: 10%  ECOG Performance: 4    HEENT: dry mouth  Lungs: tachypnea/labored breathing, audible excessive secretions  CV: RRR, S1S2  GI: soft nondistended nontender  incontinent  : incontinent   Musculoskeletal: weakness x4 fully bedbound  Skin: no abnormal skin lesions, poor skin turgor, p  Neuro: sever cognitive impairment  Oral intake ability: unable/only mouth care    LABS:                RADIOLOGY & ADDITIONAL STUDIES:

## 2023-03-21 NOTE — PROGRESS NOTE ADULT - PROBLEM SELECTOR PLAN 4
Patient continues to require GIP level of care for terminal respiratory failure and dyspnea requiring ongoing titration of parenteral medications.  Her symptoms are unable to be managed in any other setting.   patient is unstable for discharge home or nursing home, acute symptom management cannot be managed outside of inpatient hospice unit at this time  Bowel regimen with Dulcolax suppository PRN  for constipation  Tylenol suppository PRN for fever  oral hygiene  Comfort care  Palliative/Hospice education and counseling to family/caregivers

## 2023-03-21 NOTE — PROGRESS NOTE ADULT - PROBLEM SELECTOR PLAN 1
likely 2/2 aspiration pna  IWOB, pursed lip breathing resolved with scheduled MS ATC. no nonverbal signs or symptoms of pain or respiratory distress. on 4L NC  continues to be appropriate for inpatient hospice for IV symptom management.   -cw morphine 2 mg IVP q2prn  -cw morphine 2mg IVP q4 ATC  - Low threshold to repeat dose or increase PRN frequency as needed to maximize comfort through the end of the patient's life  -Ativan prn for agitation  -supportive care only, no abx likely 2/2 aspiration pneumonia, IWOB, pursed lip breathing resolved with scheduled MS ATC. no nonverbal signs or symptoms of pain or respiratory distress. on 4L NC  continues to be appropriate for inpatient hospice for IV symptom management.   -c/w morphine 2 mg IVP q2prn  -c/w morphine 2mg IVP q4 ATC  - Low threshold to repeat dose or increase PRN frequency as needed to maximize comfort through the end of the patient's life  -Ativan prn for agitation  -supportive care only, no abx

## 2023-03-21 NOTE — PROGRESS NOTE ADULT - PROBLEM SELECTOR PLAN 2
NPO, no IVFs  oral care  cw robinul PRN  scopolamine patch  keep head of bed >45 NPO, no IVFs  oral care  c/w robinul PRN  scopolamine patch  keep head of bed >45

## 2023-03-22 NOTE — PROGRESS NOTE ADULT - ATTENDING COMMENTS
Patient examined and discussed with resident Dr. Aviles.    87 yo F from home, lives with , bedbound with 24hr HHA, with PMHx  of advanced Parkinson's disease, dementia, HTN, HLD, who was originally admitted for AMS/encephalopathy likely due to aspiration pneumonia.  Patient has progressed to terminal respiratory failure, now actively dying in IPU.    Had worsening respiratory distress/WOB on 3/16 requiring change to IV morphine 2 mg Q 4 hrs ATC.  Now with worsening pulmonary congestion and respiratory failure over last 24 hours.  Required PRN IV glycopyrrolate x 2 within last 24 hours, as well as 1 dose of PRN lorazepam early this morning.      She is unresponsive, appears calm.  No overt signs of pain.  More tachypneic (RR 26) with increased labored respirations noted.    Lungs otherwise clear to auscultation, mild congestion/secretions noted  Heart tachycardic, normal S1 and S2  Abdomen soft  Ext:  without C/C/E    Patient continues to require GIP level of care for terminal respiratory failure and dyspnea requiring continued administration of scheduled parenteral opioids in order to maintain comfort.  She also continues to require frequent doses of PRN IV glycopyrrolate for ongoing management of terminal respiratory secretions.  She is minimally responsive and unable to swallow.  Her symptoms are unable to be managed in any other setting.    Worsening dyspnea/respiratory symptoms over last 24 hours  Will increase IV morphine to 3 mg Q 4 hours ATC--family in agreement  Remainder of symptom management as documented above.    Patient is DNR and DNI.  Continue comfort measures.  Family counseled/educated at bedside.
Patient examined and discussed with resident Dr. Aviles.    89 yo F from home, lives with , bedbound with 24hr HHA, with PMHx  of advanced Parkinson's disease, dementia, HTN, HLD, who was originally admitted for AMS/encephalopathy likely due to aspiration pneumonia.  Patient has progressed to terminal respiratory failure, now actively dying in IPU.    Had worsening respiratory distress/WOB yesterday requiring change to IV morphine 2 mg Q 4 hrs ATC, with improved symptom control today.  Also has continued to require multiple PRN doses of IV glycopyrrolate.    She is unresponsive, appears calm.  No overt signs of pain.  Respirations minimally labored  Lungs otherwise clear to auscultation, minimal congestion/secretions noted  Heart tachycardic, normal S1 and S2  Abdomen soft  Ext:  without C/C/E    Patient continues to require GIP level of care for terminal respiratory failure and dyspnea requiring ongoing titration of parenteral opioids.  Her symptoms are unable to be managed in any other setting.
met with family at bedside, prognosis likely hours to days
Patient examined and discussed with resident Dr. Aviles.    87 yo F from home, lives with , bedbound with 24hr HHA, with PMHx  of advanced Parkinson's disease, dementia, HTN, HLD, who was originally admitted for AMS/encephalopathy likely due to aspiration pneumonia.  Patient has progressed to terminal respiratory failure, now actively dying in IPU.    Had worsening respiratory distress/WOB on 3/16requiring change to IV morphine 2 mg Q 4 hrs ATC, with improved symptom control over last 48 hours.   Despite this, she has continued to require multiple PRN doses of IV glycopyrrolate.    Also required PRN IV morphine x 2 doses yesterday 3/20.      She is unresponsive, appears calm.  No overt signs of pain.  Respirations minimally labored, breathing overall much improved.  Symptoms appear managed.    Lungs otherwise clear to auscultation, mild congestion/secretions noted  Heart tachycardic, normal S1 and S2  Abdomen soft  Ext:  without C/C/E    Patient continues to require GIP level of care for terminal respiratory failure and dyspnea requiring continued administration of scheduled parenteral opioids in order to maintain comfort.  She also continues to require frequent doses of PRN IV glycopyrrolate for ongoing management of terminal respiratory secretions.  She is minimally responsive and unable to swallow.  Her breathing is much improved.  Her symptoms are unable to be managed in any other setting--family also lacks adequate caregiver support at home, HHA unable to give meds,  is emotionally distraught and unable to manage her symptoms, even with direction from staff.    Patient is DNR and DNI.  Continue comfort measures.  Family counseled/educated at bedside.
Patient examined and discussed with resident Dr. Aviles.    89 yo F from home, lives with , bedbound with 24hr HHA, with PMHx  of advanced Parkinson's disease, dementia, HTN, HLD, who was originally admitted for AMS/encephalopathy likely due to aspiration pneumonia.  Patient has progressed to terminal respiratory failure, now actively dying in IPU.    Had worsening respiratory distress/WOB on 3/16requiring change to IV morphine 2 mg Q 4 hrs ATC, with improved symptom control over last 48 hours.   Despite this, she has continued to require multiple PRN doses of IV glycopyrrolate.    She is unresponsive, appears calm.  No overt signs of pain.  Respirations minimally labored, breathing overall much improved.    Lungs otherwise clear to auscultation, mild congestion/secretions noted  Heart tachycardic, normal S1 and S2  Abdomen soft  Ext:  without C/C/E    Patient continues to require GIP level of care for terminal respiratory failure and dyspnea requiring continued administration of scheduled parenteral opioids in a skilled setting.  She also continues to require frequent doses of PRN IV glycopyrrolate for ongoing management of terminal respiratory secretions.  She is minimally responsive and unable to swallow.  Her symptoms are unable to be managed in any other setting.    Patient is DNR and DNI.  Continue comfort measures.  Family counseled/educated at bedside.

## 2023-03-22 NOTE — PROGRESS NOTE ADULT - PROBLEM SELECTOR PLAN 1
likely 2/2 aspiration pneumonia, IWOB, pursed lip breathing resolved with scheduled MS ATC. no nonverbal signs or symptoms of pain or respiratory distress. on 4L NC  continues to be appropriate for inpatient hospice for IV symptom management.   -c/w morphine 2 mg IVP q2prn  -c/w morphine 2mg IVP q4 ATC  - Low threshold to repeat dose or increase PRN frequency as needed to maximize comfort through the end of the patient's life  -Ativan prn for agitation  -supportive care only, no abx likely 2/2 aspiration pneumonia, IWOB, pursed lip breathing resolved with scheduled MS ATC. no nonverbal signs or symptoms of pain or respiratory distress. on 4L NC  continues to be appropriate for inpatient hospice for IV symptom management.   -c/w morphine 2 mg IVP q2prn  -Increase morphine 3mg IVP q4 ATC  - Low threshold to repeat dose or increase PRN frequency as needed to maximize comfort through the end of the patient's life  -Ativan prn for agitation  -supportive care only, no abx

## 2023-03-22 NOTE — PROGRESS NOTE ADULT - SUBJECTIVE AND OBJECTIVE BOX
follow up on:  complex medical decision making related to goals of care    [facility] Pall[facility] Consult Service:  Dr. Rose Cash: cell ([phone])  Dr. Yulia Llamas: cell ([phone])   Serg Williamson NP: cell ([phone])   Barbara Willis NP: cell ([phone])  Oracio Fitzgerald LMSW: cell ([phone])     OVERNIGHT EVENTS:  No acute overnight events.    Present Symptoms:   Pain:   Fatigue:  Nausea:  Lack of Appetite:   SOB:  Depression:  Anxiety:  Review of Systems: [ Unable to obtain due to poor mentation]    MEDICATIONS  (STANDING):  morphine  - Injectable 2 milliGRAM(s) IV Push every 4 hours  scopolamine 1 mG/72 Hr(s) Patch 1 Patch Transdermal every 72 hours    MEDICATIONS  (PRN):  acetaminophen  Suppository .. 650 milliGRAM(s) Rectal every 6 hours PRN Temp greater or equal to 38C (100.4F), Mild Pain (1 - 3)  bisacodyl Suppository 10 milliGRAM(s) Rectal daily PRN Constipation  glycopyrrolate Injectable 0.4 milliGRAM(s) IV Push every 4 hours PRN Secretions  LORazepam   Injectable 1 milliGRAM(s) IV Push every 1 hour PRN Agitation  morphine  - Injectable 2 milliGRAM(s) IV Push every 2 hours PRN tachypnea (RR > 22), labored breathing, or shortness of breath      PHYSICAL EXAM:  Vital Signs Last 24 Hrs  T(C): 36.2 (22 Mar 2023 05:11), Max: 36.7 (21 Mar 2023 14:57)  T(F): 97.2 (22 Mar 2023 05:11), Max: 98.1 (21 Mar 2023 14:57)  HR: 95 (22 Mar 2023 05:11) (95 - 111)  BP: 86/36 (22 Mar 2023 05:11) (86/36 - 117/75)  BP(mean): --  RR: 18 (22 Mar 2023 05:11) (17 - 18)  SpO2: 95% (22 Mar 2023 05:11) (93% - 99%)    Parameters below as of 22 Mar 2023 05:11  Patient On (Oxygen Delivery Method): nasal cannula  O2 Flow (L/min): 2      General: lethargic nonverbal  unarousable     Palliative Performance Scale/Karnofsky Score: 10%  ECOG Performance: 4    HEENT: dry mouth  Lungs: tachypnea/labored breathing, audible excessive secretions  CV: RRR, S1S2  GI: soft nondistended nontender  incontinent  : incontinent   Musculoskeletal: weakness x4 fully bedbound  Skin: no abnormal skin lesions, poor skin turgor, p  Neuro: sever cognitive impairment  Oral intake ability: unable/only mouth care    LABS:                RADIOLOGY & ADDITIONAL STUDIES: follow up on:  complex medical decision making related to goals of care    [facility] Geriatric and Palliative Consult Service:  Dr. Rose Cash: cell ([phone])  Dr. Yulia Llamas: cell ([phone])   Serg Williamson NP: cell ([phone])   Barbara Willis NP: cell ([phone])  Oracio Fitzgerald LMSW: cell ([phone])     OVERNIGHT EVENTS:  No acute overnight events.    Present Symptoms:   Pain:   Fatigue:  Nausea:  Lack of Appetite:   SOB:  Depression:  Anxiety:  Review of Systems: [ Unable to obtain due to poor mentation]    MEDICATIONS  (STANDING):  morphine  - Injectable 2 milliGRAM(s) IV Push every 4 hours  scopolamine 1 mG/72 Hr(s) Patch 1 Patch Transdermal every 72 hours    MEDICATIONS  (PRN):  acetaminophen  Suppository .. 650 milliGRAM(s) Rectal every 6 hours PRN Temp greater or equal to 38C (100.4F), Mild Pain (1 - 3)  bisacodyl Suppository 10 milliGRAM(s) Rectal daily PRN Constipation  glycopyrrolate Injectable 0.4 milliGRAM(s) IV Push every 4 hours PRN Secretions  LORazepam   Injectable 1 milliGRAM(s) IV Push every 1 hour PRN Agitation  morphine  - Injectable 2 milliGRAM(s) IV Push every 2 hours PRN tachypnea (RR > 22), labored breathing, or shortness of breath      PHYSICAL EXAM:  Vital Signs Last 24 Hrs  T(C): 36.2 (22 Mar 2023 05:11), Max: 36.7 (21 Mar 2023 14:57)  T(F): 97.2 (22 Mar 2023 05:11), Max: 98.1 (21 Mar 2023 14:57)  HR: 95 (22 Mar 2023 05:11) (95 - 111)  BP: 86/36 (22 Mar 2023 05:11) (86/36 - 117/75)  BP(mean): --  RR: 18 (22 Mar 2023 05:11) (17 - 18)  SpO2: 95% (22 Mar 2023 05:11) (93% - 99%)    Parameters below as of 22 Mar 2023 05:11  Patient On (Oxygen Delivery Method): nasal cannula  O2 Flow (L/min): 2      General: lethargic nonverbal  unarousable     Palliative Performance Scale/Karnofsky Score: 10%  ECOG Performance: 4    HEENT: dry mouth  Lungs: tachypnea/labored breathing, audible excessive secretions  CV: RRR, S1S2  GI: soft nondistended nontender  incontinent  : incontinent   Musculoskeletal: weakness x4 fully bedbound  Skin: no abnormal skin lesions, poor skin turgor,   Neuro: sever cognitive impairment  Oral intake ability: unable/only mouth care    LABS:                RADIOLOGY & ADDITIONAL STUDIES:

## 2023-03-23 NOTE — PROGRESS NOTE ADULT - PROBLEM SELECTOR PLAN 2
NPO, no IVFs  oral care  Continue with scopolamine patch and IV glycopyrrolate PRN  keep head of bed >45

## 2023-03-23 NOTE — PROGRESS NOTE ADULT - SUBJECTIVE AND OBJECTIVE BOX
JOANA ZAVALA                    88y  Female    Allergies    No Known Allergies    Intolerances        [facility]:  Rose Cash DO: cell ([phone])  Toni Ro MD: cell ([phone])  Can contact via [employer] Teams for any patient issues or concerns     Symptoms: Patient seen with family at bedside.  Remains unresponsive.  Slightly agitated overnight. Continued tachypnea with mild accessory muscle use, no improvement from yesterday.  Symptoms otherwise managed, no overt signs of pain.    Pain (1-10):  CPTO 0  Dyspnea:  RR 26 -30 with mild accessory muscle use  Nausea/Vomiting:  None  Secretions:   Mild   Agitation:  None presently  Symptom Requiring Inpatient Hospice Admission:  respiratory failure with terminal secretions    Overnight events/interim history: None.  IV morphine increased to 3 mg Q 4hrs ATC on 3/22.  Required PRN IV glycopyrrolate x 1 on 3/22, x 2 so far today  Received PRN morphine 2 mg x 1 and PRN lorazepam x 1 within last 24 hours    HPI:  This is a  87 yo F from home, lives with , bedbound with 24hr HHA PMHx of Parkinson's disease, dementia, HTN, HLD presenting to the ED for worsening mental status.  and daughter at bedside providing collateral, state that patient's mental status appears to be worse and she hasn't been eating much,   noticed that today she was bringing up phlegm and she appeared to have difficulty swallowing. Family deny that she has had a cough. They deny any sick contacts, and she hasn't had any fevers or chills. Found to have rhino enterovirus on RVP. Admitted to medicine for  Sepsis with acute hypoxic respiratory failure. Pt w/ sepsis (HR 95, WBC 15), and AHRF on 3L NC. CXR and CTA showing no consolidations Enterovirus +. Started on Azithro and CTX eventually discontinued as BCx showed no growth. Strep PNA, and legionella negative. Pulm consulted Dr. Sotomayor. Started chest PT and hypertonic saline. Patient remained non-verbal, minimally responsive to verbal and tactile stimuli; and failed speech and swallow evaluation and was kept NPO. Electrolytes replaced with IVF. Patient had rhonci bilaterally and increased work of breathing with pursed lips. Palliative care was consulted who Met with the pt's spouse Gerardo and her daughter Kendra Jones  at the bedside, discussed her current clinical condition and goals of care. Explained that swallowing difficulties is common in advancing dementia. The person may have a weak swallow or lose the ability to swallow safely.  Pt with advanced dementia food and fluid intake tends to decrease slowly over time. Discussed the risk/ benefits of artificial nutrition.  PEG does not optimize pt's life but rather prolongs suffering.  Given her decline, they do not want her to suffer anymore and wish to focus on comfort measures only. Educated them about hospice philosophy, services , and locations provided.  They agreed for pt to be discharged home with HCN, they already have 24/7 HHA in place to support pt. Discussed risks/benefits of LST such as CPR/ intubation, PEG in the context of advanced dementia and debility. Family aware these invasive measures will not optimize the pt's life but may cause more stress and pain. NATALIYA drafted DNR/DNI/no feeding tube/comfort measures only/DNH. Chaplaincy offered and deferred at the time. Palliative followed up the next day as patient's clinical condition appeared to have worsened. Spoke with the pt's daughter Kendra via phone, explained that the pt condition has worsened with increased work of breathing requiring increased oxygen support.  She is appropriate for inpatient hospice for symptom management with IV medications for end of life care.  (15 Mar 2023 18:00)            MEDICATIONS  (STANDING):  morphine  - Injectable 4 milliGRAM(s) IV Push every 4 hours  scopolamine 1 mG/72 Hr(s) Patch 1 Patch Transdermal every 72 hours    MEDICATIONS  (PRN):  acetaminophen  Suppository .. 650 milliGRAM(s) Rectal every 6 hours PRN Temp greater or equal to 38C (100.4F), Mild Pain (1 - 3)  bisacodyl Suppository 10 milliGRAM(s) Rectal daily PRN Constipation  glycopyrrolate Injectable 0.4 milliGRAM(s) IV Push every 4 hours PRN Secretions  morphine  - Injectable 2 milliGRAM(s) IV Push every 1 hour PRN tachypnea (RR > 22), labored breathing, or respiratory distress                             Vital Signs Last 24 Hrs  T(C): 37.6 (23 Mar 2023 13:10), Max: 37.6 (23 Mar 2023 13:10)  T(F): 99.7 (23 Mar 2023 13:10), Max: 99.7 (23 Mar 2023 13:10)  HR: 119 (23 Mar 2023 13:10) (73 - 119)  BP: 88/65 (23 Mar 2023 13:10) (88/65 - 104/68)  BP(mean): --  RR: 17 (23 Mar 2023 13:10) (17 - 18)  SpO2: 90% (23 Mar 2023 13:10) (90% - 97%)    Parameters below as of 23 Mar 2023 13:10  Patient On (Oxygen Delivery Method): nasal cannula  O2 Flow (L/min): 2      General: alert  oriented x __zero, lethargic, unresponsive    Karnofsky Performance Score/Palliative Performance Status Version2:  10 %    HEENT: MM slightly dry  Lungs: tachypnea with mild labored breathing, minimal secretions noted  CV:  Tachycardic, normal S1 and S2  GI: soft non distended non tender normal bowel sounds  : incontinent    Musculoskeletal:  fully bedbound, no edema, no focal joint findings identified  Skin: poor skin turgor, no lesions or DU noted  Neuro: grossly non-focal  Oral intake ability: unable/mouth care only  Code Status: DNR/DNI

## 2023-03-23 NOTE — PROGRESS NOTE ADULT - PROBLEM SELECTOR PLAN 4
Advanced dementia.  Noverbal, bedbound, total care.  FAST 7d.  Patient no longer arousable with likely prognosis of days.    Continue symptom management, family wants comfort.  DNR/DNI

## 2023-03-23 NOTE — PROGRESS NOTE ADULT - PROBLEM SELECTOR PLAN 1
likely 2/2 aspiration pneumonia, IWOB, pursed lip breathing resolved with scheduled MS ATC. no nonverbal signs or symptoms of pain or respiratory distress. on 4L NC  continues to be appropriate for inpatient hospice for IV symptom management.   Will increase IV morphine to 4 mg Q 4 hrs ATC, change PRN IV morphine to 2 mg Q 1 hr PRN for breakthrough respiratory distress  -Continue IV Ativan prn for agitation  -Continue supportive care

## 2023-03-24 NOTE — PROGRESS NOTE ADULT - SUBJECTIVE AND OBJECTIVE BOX
JOANA ZAVALA                    88y  Female    Allergies    No Known Allergies    Intolerances        [facility] Service:  Rose Cash DO: cell ([phone])  Toni Ro MD: cell ([phone])  Can contact via Microsoft Teams for any patient issues or concerns     Symptoms:  Pain (1-10): None  CPOT 0  Dyspnea:  Remains grossly tachypneic, RR 36 with mild accessory muscle use noted, but overall appears comfortable  Nausea/Vomiting:  None  Secretions: frequent  Agitation:  None  Symptom Requiring Inpatient Hospice Admission:  dyspnea/respiratory failure and terminal secretions    Overnight events/interim history:  Fever with Tmax of 101.3 overnight  Ongoing respiratory secretions requiring multiple doses of IV glycopyrrolate (x 4 on 3/23, x 3 so far today)      HPI:  This is a  87 yo F from home, lives with , bedbound with 24hr HHA PMHx of Parkinson's disease, dementia, HTN, HLD presenting to the ED for worsening mental status.  and daughter at bedside providing collateral, state that patient's mental status appears to be worse and she hasn't been eating much,   noticed that today she was bringing up phlegm and she appeared to have difficulty swallowing. Family deny that she has had a cough. They deny any sick contacts, and she hasn't had any fevers or chills. Found to have rhino enterovirus on RVP. Admitted to medicine for  Sepsis with acute hypoxic respiratory failure. Pt w/ sepsis (HR 95, WBC 15), and AHRF on 3L NC. CXR and CTA showing no consolidations Enterovirus +. Started on Azithro and CTX eventually discontinued as BCx showed no growth. Strep PNA, and legionella negative. Pulm consulted Dr. Sotomayor. Started chest PT and hypertonic saline. Patient remained non-verbal, minimally responsive to verbal and tactile stimuli; and failed speech and swallow evaluation and was kept NPO. Electrolytes replaced with IVF. Patient had rhonci bilaterally and increased work of breathing with pursed lips. Palliative care was consulted who Met with the pt's spouse Gerardo and her daughter Kendra Jones  at the bedside, discussed her current clinical condition and goals of care. Explained that swallowing difficulties is common in advancing dementia. The person may have a weak swallow or lose the ability to swallow safely.  Pt with advanced dementia food and fluid intake tends to decrease slowly over time. Discussed the risk/ benefits of artificial nutrition.  PEG does not optimize pt's life but rather prolongs suffering.  Given her decline, they do not want her to suffer anymore and wish to focus on comfort measures only. Educated them about hospice philosophy, services , and locations provided.  They agreed for pt to be discharged home with [facility], they already have 24/7 HHA in place to support pt. Discussed risks/benefits of LST such as CPR/ intubation, PEG in the context of advanced dementia and debility. Family aware these invasive measures will not optimize the pt's life but may cause more stress and pain. NATALIYA drafted DNR/DNI/no feeding tube/comfort measures only/DNH. Chaplaincy offered and deferred at the time. Palliative followed up the next day as patients clinical condition appeared to have worsened. Spoke with the pt's daughter Kendra via phone, explained that the pt condition has worsened with increased work of breathing requiring increased oxygen support.  She is appropriate for inpatient hospice for symptom management with IV medications for end of life care.  Kendra stated she needs to see the pt before making any decisions.  DNR/DNI on file. Later met with pt's daughter at the bedside, she stated she and her family are in agreement for inpatient hospice for symptom management with IV medications. Chaplaincy offered and accepted.     Patient examined with multiple family members present at bedside.  Remains lethargic and essentially unresponsive.  Continued tachypnea to 36 with mild accessory muscle use.  No non-verbal signs of pain.  Symptoms otherwise appear to be managed.              MEDICATIONS  (STANDING):  morphine  - Injectable 4 milliGRAM(s) IV Push every 4 hours  scopolamine 1 mG/72 Hr(s) Patch 1 Patch Transdermal every 72 hours    MEDICATIONS  (PRN):  acetaminophen  Suppository .. 650 milliGRAM(s) Rectal every 6 hours PRN Temp greater or equal to 38C (100.4F), Mild Pain (1 - 3)  bisacodyl Suppository 10 milliGRAM(s) Rectal daily PRN Constipation  glycopyrrolate Injectable 0.4 milliGRAM(s) IV Push every 4 hours PRN Secretions  morphine  - Injectable 2 milliGRAM(s) IV Push every 1 hour PRN tachypnea (RR > 22), labored breathing, or respiratory distress                             Vital Signs Last 24 Hrs  T(C): 36.6 (24 Mar 2023 12:42), Max: 38.5 (23 Mar 2023 21:35)  T(F): 97.9 (24 Mar 2023 12:42), Max: 101.3 (23 Mar 2023 21:35)  HR: 81 (24 Mar 2023 12:42) (81 - 106)  BP: 65/44 (24 Mar 2023 12:42) (56/35 - 74/54)  BP(mean): --  RR: 19 (24 Mar 2023 12:42) (18 - 20)  SpO2: 97% (24 Mar 2023 12:42) (88% - 97%)    Parameters below as of 24 Mar 2023 12:42  Patient On (Oxygen Delivery Method): nasal cannula  O2 Flow (L/min): 2      General: alert  oriented x __0__    lethargic and unresponsive  Karnofsky Performance Score/Palliative Performance Status Version2:   10  %    HEENT:  Anicteric pharynx clear MM dry  Lungs: Tachypneic RR 36 with slight accessory muscle use but otherwise appears comfortable.  Mild congestion noted.  CV:  RSR normal S1 and S2, no murmur  GI: soft non distended non tender normal bowel sounds  : incontinent    Musculoskeletal: fully bedbound, + weakness in all 4 extremities, no edema  Skin: No skin lesions or DU noted  Neuro: severe cognitive impairment, lethargic, otherwise non-focal  Oral intake ability: unable/mouth care only  Code Status: DNR/DNI

## 2023-03-24 NOTE — PROGRESS NOTE ADULT - ASSESSMENT
89 yo F bedbound w 24hr HHA PMHx of PD, dementia who was originally admitted for AMS/encephalopathy likely due to aspiration pneumonia.  Patient has progressed to terminal respiratory failure, now actively dying in IPU.
 89 yo F from home, lives with , bedbound with 24hr HHA PMHx of Parkinson's disease, dementia, HTN, HLD presenting to the ED for worsening mental status admitted now to IPU.
 87 yo F bedbound w 24hr HHA PMHx of PD, dementia who was originally admitted for AMS/encephalopathy likely due to aspiration pneumonia.  Patient has progressed to terminal respiratory failure, now actively dying in IPU.
 89 yo F bedbound w 24hr HHA PMHx of PD, dementia who was originally admitted for AMS/encephalopathy likely due to aspiration pneumonia.  Patient has progressed to terminal respiratory failure, now actively dying in IPU.
 87 yo F from home, lives with , bedbound with 24hr HHA PMHx of Parkinson's disease, dementia, HTN, HLD presenting to the ED for worsening mental status admitted now to IPU.

## 2023-03-24 NOTE — PROGRESS NOTE ADULT - PROBLEM SELECTOR PLAN 2
NPO, no IVFs  oral care  Continue with scopolamine patch and IV glycopyrrolate 0.4 mg Q4 PRN--continues to require frequent dosing  keep head of bed >45

## 2023-03-24 NOTE — PROGRESS NOTE ADULT - PROBLEM SELECTOR PLAN 1
Likely due to aspiration pneumonia, IWOB, pursed lip breathing resolved with scheduled MS ATC. no nonverbal signs or symptoms of pain or respiratory distress. on 4L NC  continues to be appropriate for inpatient hospice for IV symptom management.   Continue IV morphine to 4 mg Q 4 hrs ATC, and PRN IV morphine to 2 mg Q 1 hr PRN for breakthrough respiratory distress    Discussed possible conversion to opioid infusion with daughter, she does not want to consider at this time.  -Continue IV Ativan prn for agitation  -Continue supportive care

## 2023-03-25 NOTE — PROGRESS NOTE ADULT - PROBLEM SELECTOR PLAN 4
Advanced dementia.  Noverbal, bedbound, total care.  FAST 7d. Hospice appropriate.   Patient no longer arousable with likely prognosis of days.      Family agreed w IPU and IV medications for symptom management    DNR/DNI.

## 2023-03-25 NOTE — PROGRESS NOTE ADULT - PROBLEM SELECTOR PLAN 1
Likely due to aspiration pneumonia, IWOB, pursed lip breathing resolved with scheduled MS ATC. no nonverbal signs or symptoms of pain or respiratory distress. on 4L NC  continues to be appropriate for inpatient hospice for IV symptom management    -c/w Morphine ATC  -Bowel regimen while on opioids  -Mouth care  -Comfort

## 2023-03-25 NOTE — PROGRESS NOTE ADULT - SUBJECTIVE AND OBJECTIVE BOX
JOANA ZAVALA                    88y  Female    Allergies    No Known Allergies    Intolerances        Symptoms:  Pain (1-10):  Dyspnea:   Nausea/Vomiting:  Secretions:   Agitation:  Symptom Requiring Inpatient Hospice Admission:dyspnea/respiratory failure and terminal secretions      Overnight events/interim history: No acute events overnight.  Pt appears to be actively dying    HPI:  This is a  87 yo F from home, lives with , bedbound with 24hr HHA PMHx of Parkinson's disease, dementia, HTN, HLD presenting to the ED for worsening mental status.  and daughter at bedside providing collateral, state that patient's mental status appears to be worse and she hasn't been eating much,   noticed that today she was bringing up phlegm and she appeared to have difficulty swallowing. Family deny that she has had a cough. They deny any sick contacts, and she hasn't had any fevers or chills. Found to have rhino enterovirus on RVP. Admitted to medicine for  Sepsis with acute hypoxic respiratory failure. Pt w/ sepsis (HR 95, WBC 15), and AHRF on 3L NC. CXR and CTA showing no consolidations Enterovirus +. Started on Azithro and CTX eventually discontinued as BCx showed no growth. Strep PNA, and legionella negative. Pulm consulted Dr. Sotomayor. Started chest PT and hypertonic saline. Patient remained non-verbal, minimally responsive to verbal and tactile stimuli; and failed speech and swallow evaluation and was kept NPO. Electrolytes replaced with IVF. Patient had rhonci bilaterally and increased work of breathing with pursed lips. Palliative care was consulted who Met with the pt's spouse Gerardo and her daughter Kendra Jones  at the bedside, discussed her current clinical condition and goals of care. Explained that swallowing difficulties is common in advancing dementia. The person may have a weak swallow or lose the ability to swallow safely.  Pt with advanced dementia food and fluid intake tends to decrease slowly over time. Discussed the risk/ benefits of artificial nutrition.  PEG does not optimize pt's life but rather prolongs suffering.  Given her decline, they do not want her to suffer anymore and wish to focus on comfort measures only. Educated them about hospice philosophy, services , and locations provided.  They agreed for pt to be discharged home with [facility], they already have 24/7 HHA in place to support pt. Discussed risks/benefits of LST such as CPR/ intubation, PEG in the context of advanced dementia and debility. Family aware these invasive measures will not optimize the pt's life but may cause more stress and pain. NATALIYA drafted DNR/DNI/no feeding tube/comfort measures only/DNH. Chaplaincy offered and deferred at the time. Palliative followed up the next day as patients clinical condition appeared to have worsened. Spoke with the pt's daughter Kendra via phone, explained that the pt condition has worsened with increased work of breathing requiring increased oxygen support.  She is appropriate for inpatient hospice for symptom management with IV medications for end of life care.  Kendra stated she needs to see the pt before making any decisions.  DNR/DNI on file. Later met with pt's daughter at the bedside, she stated she and her family are in agreement for inpatient hospice for symptom management with IV medications. Chaplaincy offered and accepted.    (15 Mar 2023 18:00)            MEDICATIONS  (STANDING):  morphine  - Injectable 4 milliGRAM(s) IV Push every 4 hours  scopolamine 1 mG/72 Hr(s) Patch 1 Patch Transdermal every 72 hours    MEDICATIONS  (PRN):  acetaminophen  Suppository .. 650 milliGRAM(s) Rectal every 6 hours PRN Temp greater or equal to 38C (100.4F), Mild Pain (1 - 3)  bisacodyl Suppository 10 milliGRAM(s) Rectal daily PRN Constipation  glycopyrrolate Injectable 0.4 milliGRAM(s) IV Push every 4 hours PRN Secretions  morphine  - Injectable 2 milliGRAM(s) IV Push every 1 hour PRN tachypnea (RR > 22), labored breathing, or respiratory distress                             Vital Signs Last 24 Hrs  T(C): 37.2 (25 Mar 2023 12:45), Max: 37.2 (25 Mar 2023 12:45)  T(F): 98.9 (25 Mar 2023 12:45), Max: 98.9 (25 Mar 2023 12:45)  HR: 106 (25 Mar 2023 12:45) (106 - 109)  BP: 56/39 (25 Mar 2023 12:45) (56/39 - 69/41)  BP(mean): --  RR: 17 (25 Mar 2023 12:45) (17 - 18)  SpO2: 89% (25 Mar 2023 12:45) (89% - 92%)    Parameters below as of 25 Mar 2023 12:45  Patient On (Oxygen Delivery Method): nasal cannula  O2 Flow (L/min): 2    General: AOX0,   lethargic and unresponsive  Karnofsky Performance Score/Palliative Performance Status Version2:   10  %    HEENT:  Anicteric pharynx clear MM dry  Lungs: unlabored on NC  CV:  RSR normal S1 and S2  GI: soft non distended non tender normal bowel sounds  : incontinent    Musculoskeletal: fully bedbound, + weakness in all 4 extremities, no edema  Skin: No skin lesions or DU noted  Neuro: unable to follow commands  Oral intake ability: unable/mouth care only  Code Status: DNR/DNI    Code Status: DNR/DNI

## 2023-03-27 NOTE — PROGRESS NOTE ADULT - PROBLEM SELECTOR PROBLEM 4
Parkinson's disease dementia
Hospice care patient
Parkinson's disease dementia
Hospice care patient
Parkinson's disease dementia
Hospice care patient
Hospice care patient
Parkinson's disease dementia

## 2023-03-27 NOTE — DISCHARGE NOTE FOR THE EXPIRED PATIENT - HOSPITAL COURSE
89 yo F from home, lives with , bedbound with 24hr HHA PMHx of Parkinson's disease, dementia, HTN, HLD presenting to the ED for worsening mental status.  and daughter at bedside providing collateral, state that patient's mental status appears to be worse and she hasn't been eating much,   noticed that today she was bringing up phlegm and she appeared to have difficulty swallowing. Family deny that she has had a cough. They deny any sick contacts, and she hasn't had any fevers or chills. Found to have rhino enterovirus on RVP. Admitted to medicine for  Sepsis with acute hypoxic respiratory failure. Pt w/ sepsis (HR 95, WBC 15), and AHRF on 3L NC. CXR and CTA showing no consolidations Enterovirus +. Started on Azithro and CTX eventually discontinued as BCx showed no growth. Strep PNA, and legionella negative. Pulm consulted Dr. Sotomayor. Started chest PT and hypertonic saline. Patient remained non-verbal, minimally responsive to verbal and tactile stimuli; and failed speech and swallow evaluation and was kept NPO. Electrolytes replaced with IVF. Patient had rhonci bilaterally and increased work of breathing with pursed lips. Palliative care was consulted who Met with the pt's spouse Gerardo and her daughter Kendra Jones  at the bedside, discussed her current clinical condition and goals of care. Explained that swallowing difficulties is common in advancing dementia. The person may have a weak swallow or lose the ability to swallow safely.  Pt with advanced dementia food and fluid intake tends to decrease slowly over time. Discussed the risk/ benefits of artificial nutrition.  PEG does not optimize pt's life but rather prolongs suffering.  Given her decline, they do not want her to suffer anymore and wish to focus on comfort measures only. Educated them about hospice philosophy, services , and locations provided.  They agreed for pt to be discharged home with [facility], they already have 24/7 HHA in place to support pt. Discussed risks/benefits of LST such as CPR/ intubation, PEG in the context of advanced dementia and debility. Family aware these invasive measures will not optimize the pt's life but may cause more stress and pain. NATALIYA drafted DNR/DNI/no feeding tube/comfort measures only/DNH. Chaplaincy offered and deferred at the time. Palliative followed up the next day as patients clinical condition appeared to have worsened. Spoke with the pt's daughter Kendra via phone, explained that the pt condition has worsened with increased work of breathing requiring increased oxygen support.  She is appropriate for inpatient hospice for symptom management with IV medications for end of life care.  Kendra stated she needs to see the pt before making any decisions.  DNR/DNI on file. Later met with pt's daughter at the bedside, she stated she and her family are in agreement for inpatient hospice; patient admitted to hospice IPU on 3/15 for terminal respiratory failure and secretions requiring ongoing administration and titration of multiple IV meds for symptom management.

## 2023-03-27 NOTE — PROGRESS NOTE ADULT - PROBLEM SELECTOR PLAN 4
Advanced dementia.  Nonverbal, bedbound, total care.  FAST 7d. Hospice appropriate.   Patient no longer arousable with likely prognosis of days.      Family agreed w IPU and IV medications for symptom management    DNR/DN

## 2023-03-27 NOTE — PROGRESS NOTE ADULT - PROBLEM SELECTOR PLAN 5
Eligible for continued inpatient hospice care admission due to ongoing active symptom management with IV medications.  Patient is unstable for discharge home or nursing, acute symptom management cannot be managed outside of inpatient hospice unit at this time  Bowel regimen with Dulcolax suppository PRN  for constipation  Robinul IVP PRN for Oral secretions management   Tylenol suppository PRN for fever  oral hygiene  Comfort care  No family at the bedside.    D/w RN
Patient continues to require [facility] level of care for terminal respiratory failure and dyspnea requiring ongoing titration of parenteral medications.  She continues to require frequent PRN doses of IV glycopyrrolate.  Her symptoms are unable to be managed in any other setting.     Patient with persistent tachypnea, will continue morphine to 4 mg Q 4 hours ATC.  If symptoms persist will need to consider possible conversion to continuous opoid infusion (morphine or Dilaudid)--family refusing currently    Bowel regimen with Dulcolax suppository PRN  for constipation  Tylenol suppository PRN for fever  oral hygiene  Comfort care    Patient remains DNR/DNI with prognosis of hours to days  Palliative/Hospice education and counseling given to family at bedside, support offered.  Will request additional spiritual care/chaplaincy support for .
Eligible for continued inpatient hospice care admission due to ongoing active symptom management with IV medications. patient is unstable for discharge home or nursing, acute symptom management cannot be managed outside of inpatient hospice unit at this time  Bowel regimen with Dulcolax suppository PRN  for constipation  Robinul IVP PRN for Oral secretions management   Tylenol suppository PRN for fever  oral hygiene  Comfort care  Palliative/Hospice education and counseling to family/caregivers
Patient continues to require [facility] level of care for terminal respiratory failure and dyspnea requiring ongoing titration of parenteral medications.  Her symptoms are unable to be managed in any other setting.     Patient with continued tachypnea and labored breathing, will increase morphine to 4 mg Q 4 hours ATC.  If symptoms persist will need to consider possible conversion to continuous opoid infusion (morphine or Dilaudid)    Bowel regimen with Dulcolax suppository PRN  for constipation  Tylenol suppository PRN for fever  oral hygiene  Comfort care    Patient remains DNR/DNI with prognosis of hours to days  Palliative/Hospice education and counseling given to family at bedside, support offered.  Will request additional spiritual care/chaplaincy support for .

## 2023-03-27 NOTE — PROGRESS NOTE ADULT - PROVIDER SPECIALTY LIST ADULT
Palliative Care

## 2023-03-27 NOTE — PROGRESS NOTE ADULT - REASON FOR ADMISSION
secretions, labored breathing

## 2023-03-27 NOTE — PROGRESS NOTE ADULT - PROBLEM SELECTOR PROBLEM 3
Parkinson's disease dementia
Restlessness and agitation
Increased oropharyngeal secretions
Parkinson's disease dementia
Restlessness and agitation
Increased oropharyngeal secretions
Parkinson's disease dementia
Parkinson's disease dementia

## 2023-03-27 NOTE — PROGRESS NOTE ADULT - PROBLEM SELECTOR PROBLEM 2
Increased oropharyngeal secretions
Restlessness and agitation
Increased oropharyngeal secretions
Restlessness and agitation

## 2023-03-27 NOTE — PROGRESS NOTE ADULT - PROBLEM SELECTOR PLAN 1
Likely due to aspiration pneumonia, IWOB, pursed lip breathing resolved with scheduled MS ATC. no nonverbal signs or symptoms of pain or respiratory distress. on 4L NC  continues to be appropriate for inpatient hospice for IV symptom management    -d'c  Morphine ATC  -Start Morphine 2 mg /hr gtt  -c/w Morphine prn for breakthrough   -Bowel regimen while on opioids  -Mouth care  -Comfort.

## 2023-03-27 NOTE — PROGRESS NOTE ADULT - PROBLEM SELECTOR PROBLEM 1
Acute respiratory distress

## 2023-03-27 NOTE — PROGRESS NOTE ADULT - PROBLEM SELECTOR PLAN 3
Advanced dementia.  AOX0, non verbal, bedbound.  Total care.  FAST 7d.  not arousable anymore  prognosis likely days
Advanced dementia.  AOX0, non verbal, bedbound.  Total care.  FAST 7d.  not arousable anymore  prognosis likely days
Continue IV lorazepam 1 mg Q 1 hour PRN agitation
NPO no IVF    c/w scopolamine patch and IV glycopyrrolate 0.4 mg Q4 PRN for secretions  keep head of bed >45
NPO no IVF    c/w scopolamine patch and IV glycopyrrolate 0.4 mg Q4 PRN for secretions  keep head of bed >45.
Advanced dementia.  AOX0, non verbal, bedbound.  Total care.  FAST 7d.  not arousable anymore  prognosis likely days
Advanced dementia.  AOX0, non verbal, bedbound.  Total care.  FAST 7d.  not arousable anymore  prognosis likely days
Continue IV lorazepam 1 mg Q 1 hour PRN agitation
Advanced dementia.  AOX0, non verbal, bedbound.  Total care.  FAST 7d.  not arousable anymore  prognosis likely days
Advanced dementia.  AOX0, non verbal, bedbound.  Total care.  FAST 7d.  not arousable anymore  prognosis likely days

## 2023-03-27 NOTE — PROGRESS NOTE ADULT - SUBJECTIVE AND OBJECTIVE BOX
JOANA ZAVALA                    88y  Female    Allergies    No Known Allergies    Intolerances        [facility]:  Rose Shreya DO: cell ([phone])  Can contact via [employer] Teams for any patient issues or concerns     Symptoms:  Pain (1-10):   Dyspnea:  Nausea/Vomiting:  Secretions:   Agitation:  Symptom Requiring Inpatient Hospice Admission:dyspnea/respiratory failure and terminal secretions      Overnight events/interim history: No events overnight. Pt remains unresponsive receiving morphine ATC, Rubinol x5 prn past 24 hours.     HPI:  This is a  87 yo F from home, lives with , bedbound with 24hr HHA PMHx of Parkinson's disease, dementia, HTN, HLD presenting to the ED for worsening mental status.  and daughter at bedside providing collateral, state that patient's mental status appears to be worse and she hasn't been eating much,   noticed that today she was bringing up phlegm and she appeared to have difficulty swallowing. Family deny that she has had a cough. They deny any sick contacts, and she hasn't had any fevers or chills. Found to have rhino enterovirus on RVP. Admitted to medicine for  Sepsis with acute hypoxic respiratory failure. Pt w/ sepsis (HR 95, WBC 15), and AHRF on 3L NC. CXR and CTA showing no consolidations Enterovirus +. Started on Azithro and CTX eventually discontinued as BCx showed no growth. Strep PNA, and legionella negative. Pulm consulted Dr. Sotomayor. Started chest PT and hypertonic saline. Patient remained non-verbal, minimally responsive to verbal and tactile stimuli; and failed speech and swallow evaluation and was kept NPO. Electrolytes replaced with IVF. Patient had rhonci bilaterally and increased work of breathing with pursed lips. Palliative care was consulted who Met with the pt's spouse Gerardo and her daughter Kendra Jones  at the bedside, discussed her current clinical condition and goals of care. Explained that swallowing difficulties is common in advancing dementia. The person may have a weak swallow or lose the ability to swallow safely.  Pt with advanced dementia food and fluid intake tends to decrease slowly over time. Discussed the risk/ benefits of artificial nutrition.  PEG does not optimize pt's life but rather prolongs suffering.  Given her decline, they do not want her to suffer anymore and wish to focus on comfort measures only. Educated them about hospice philosophy, services , and locations provided.  They agreed for pt to be discharged home with [facility], they already have 24/7 HHA in place to support pt. Discussed risks/benefits of LST such as CPR/ intubation, PEG in the context of advanced dementia and debility. Family aware these invasive measures will not optimize the pt's life but may cause more stress and pain. NATALIYA drafted DNR/DNI/no feeding tube/comfort measures only/DNH. Chaplaincy offered and deferred at the time. Palliative followed up the next day as patients clinical condition appeared to have worsened. Spoke with the pt's daughter Kendra via phone, explained that the pt condition has worsened with increased work of breathing requiring increased oxygen support.  She is appropriate for inpatient hospice for symptom management with IV medications for end of life care.  Kendra stated she needs to see the pt before making any decisions.  DNR/DNI on file. Later met with pt's daughter at the bedside, she stated she and her family are in agreement for inpatient hospice for symptom management with IV medications. Chaplaincy offered and accepted.    (15 Mar 2023 18:00)            MEDICATIONS  (STANDING):  morphine  Infusion 2 mG/Hr (2 mL/Hr) IV Continuous <Continuous>  scopolamine 1 mG/72 Hr(s) Patch 1 Patch Transdermal every 72 hours    MEDICATIONS  (PRN):  acetaminophen  Suppository .. 650 milliGRAM(s) Rectal every 6 hours PRN Temp greater or equal to 38C (100.4F), Mild Pain (1 - 3)  bisacodyl Suppository 10 milliGRAM(s) Rectal daily PRN Constipation  glycopyrrolate Injectable 0.4 milliGRAM(s) IV Push every 4 hours PRN Secretions  morphine  - Injectable 2 milliGRAM(s) IV Push every 1 hour PRN tachypnea (RR > 22), labored breathing, or respiratory distress                             Vital Signs Last 24 Hrs  T(C): 37.8 (27 Mar 2023 05:41), Max: 37.8 (27 Mar 2023 05:41)  T(F): 100.1 (27 Mar 2023 05:41), Max: 100.1 (27 Mar 2023 05:41)  HR: 89 (27 Mar 2023 05:41) (64 - 90)  BP: 102/66 (27 Mar 2023 05:41) (54/27 - 102/66)  BP(mean): --  RR: 18 (27 Mar 2023 05:41) (18 - 20)  SpO2: 95% (27 Mar 2023 05:41) (93% - 95%)    Parameters below as of 27 Mar 2023 05:41  Patient On (Oxygen Delivery Method): nasal cannula  O2 Flow (L/min): 2    General: AOX0,   lethargic and unresponsive  Karnofsky Performance Score/Palliative Performance Status Version2:   10  %    HEENT:  Anicteric pharynx clear MM dry  Lungs: unlabored on NC  CV:  RSR normal S1 and S2  GI: soft non distended non tender normal bowel sounds  : incontinent    Musculoskeletal: fully bedbound, + weakness in all 4 extremities, no edema  Skin: No skin lesions or DU noted  Neuro: unable to follow commands  Oral intake ability: unable/mouth care only  Code Status: DNR/DNI

## 2023-08-04 NOTE — PATIENT PROFILE ADULT - NS PRO AD NO ADVANCE DIRECTIVE
Acitretin Pregnancy And Lactation Text: This medication is Pregnancy Category X and should not be given to women who are pregnant or may become pregnant in the future. This medication is excreted in breast milk. No

## 2024-10-10 NOTE — ED ADULT NURSE NOTE - SEPSIS REFERENCE DATA CRITERIA 2
Daughter called stating mobile lab can not come in the morning to draw labs. Asking if it is ok to have the BMP non-fasting. Please advise, thank you.   
Message left for pt's daughter informing her that the blood work can be non fasting.   
Abnormal Lactate: > 2